# Patient Record
Sex: FEMALE | Race: BLACK OR AFRICAN AMERICAN | NOT HISPANIC OR LATINO
[De-identification: names, ages, dates, MRNs, and addresses within clinical notes are randomized per-mention and may not be internally consistent; named-entity substitution may affect disease eponyms.]

---

## 2017-03-20 ENCOUNTER — CHART COPY (OUTPATIENT)
Age: 37
End: 2017-03-20

## 2017-03-24 ENCOUNTER — APPOINTMENT (OUTPATIENT)
Dept: ORTHOPEDIC SURGERY | Facility: CLINIC | Age: 37
End: 2017-03-24

## 2017-03-24 VITALS
BODY MASS INDEX: 24.45 KG/M2 | HEART RATE: 75 BPM | HEIGHT: 64.5 IN | SYSTOLIC BLOOD PRESSURE: 113 MMHG | DIASTOLIC BLOOD PRESSURE: 83 MMHG | WEIGHT: 145 LBS

## 2017-04-05 ENCOUNTER — APPOINTMENT (OUTPATIENT)
Dept: ORTHOPEDIC SURGERY | Facility: CLINIC | Age: 37
End: 2017-04-05

## 2017-04-05 VITALS
BODY MASS INDEX: 24.62 KG/M2 | HEART RATE: 76 BPM | WEIGHT: 146 LBS | DIASTOLIC BLOOD PRESSURE: 68 MMHG | SYSTOLIC BLOOD PRESSURE: 107 MMHG | HEIGHT: 64.5 IN

## 2017-04-05 DIAGNOSIS — M75.80 OTHER SHOULDER LESIONS, UNSPECIFIED SHOULDER: ICD-10-CM

## 2017-04-07 ENCOUNTER — APPOINTMENT (OUTPATIENT)
Dept: ORTHOPEDIC SURGERY | Facility: CLINIC | Age: 37
End: 2017-04-07

## 2017-04-07 VITALS
DIASTOLIC BLOOD PRESSURE: 73 MMHG | BODY MASS INDEX: 24.62 KG/M2 | HEIGHT: 64.5 IN | WEIGHT: 146 LBS | SYSTOLIC BLOOD PRESSURE: 108 MMHG | HEART RATE: 76 BPM

## 2017-05-10 ENCOUNTER — EMERGENCY (EMERGENCY)
Facility: HOSPITAL | Age: 37
LOS: 1 days | Discharge: HOME | End: 2017-05-10
Attending: EMERGENCY MEDICINE | Admitting: EMERGENCY MEDICINE
Payer: COMMERCIAL

## 2017-05-10 ENCOUNTER — APPOINTMENT (OUTPATIENT)
Dept: ORTHOPEDIC SURGERY | Facility: CLINIC | Age: 37
End: 2017-05-10

## 2017-05-10 VITALS — HEART RATE: 99 BPM | DIASTOLIC BLOOD PRESSURE: 94 MMHG | SYSTOLIC BLOOD PRESSURE: 140 MMHG | HEIGHT: 64.5 IN

## 2017-05-10 VITALS
RESPIRATION RATE: 18 BRPM | SYSTOLIC BLOOD PRESSURE: 120 MMHG | OXYGEN SATURATION: 98 % | DIASTOLIC BLOOD PRESSURE: 87 MMHG | TEMPERATURE: 99 F

## 2017-05-10 DIAGNOSIS — Z98.89 OTHER SPECIFIED POSTPROCEDURAL STATES: Chronic | ICD-10-CM

## 2017-05-10 DIAGNOSIS — M54.5 LOW BACK PAIN: ICD-10-CM

## 2017-05-10 LAB
ALBUMIN SERPL ELPH-MCNC: 4.1 G/DL — SIGNIFICANT CHANGE UP (ref 3.3–5)
ALP SERPL-CCNC: 34 U/L — LOW (ref 40–120)
ALT FLD-CCNC: 19 U/L RC — SIGNIFICANT CHANGE UP (ref 10–45)
ANION GAP SERPL CALC-SCNC: 13 MMOL/L — SIGNIFICANT CHANGE UP (ref 5–17)
ANION GAP SERPL CALC-SCNC: 15 MMOL/L — SIGNIFICANT CHANGE UP (ref 5–17)
AST SERPL-CCNC: 35 U/L — SIGNIFICANT CHANGE UP (ref 10–40)
BASOPHILS # BLD AUTO: 0.1 K/UL — SIGNIFICANT CHANGE UP (ref 0–0.2)
BASOPHILS NFR BLD AUTO: 1.9 % — SIGNIFICANT CHANGE UP (ref 0–2)
BILIRUB SERPL-MCNC: 0.5 MG/DL — SIGNIFICANT CHANGE UP (ref 0.2–1.2)
BUN SERPL-MCNC: 16 MG/DL — SIGNIFICANT CHANGE UP (ref 7–23)
BUN SERPL-MCNC: 19 MG/DL — SIGNIFICANT CHANGE UP (ref 7–23)
CALCIUM SERPL-MCNC: 9 MG/DL — SIGNIFICANT CHANGE UP (ref 8.4–10.5)
CALCIUM SERPL-MCNC: 9.2 MG/DL — SIGNIFICANT CHANGE UP (ref 8.4–10.5)
CHLORIDE SERPL-SCNC: 104 MMOL/L — SIGNIFICANT CHANGE UP (ref 96–108)
CHLORIDE SERPL-SCNC: 105 MMOL/L — SIGNIFICANT CHANGE UP (ref 96–108)
CO2 SERPL-SCNC: 18 MMOL/L — LOW (ref 22–31)
CO2 SERPL-SCNC: 19 MMOL/L — LOW (ref 22–31)
CREAT SERPL-MCNC: 1.17 MG/DL — SIGNIFICANT CHANGE UP (ref 0.5–1.3)
CREAT SERPL-MCNC: 1.28 MG/DL — SIGNIFICANT CHANGE UP (ref 0.5–1.3)
EOSINOPHIL # BLD AUTO: 0.3 K/UL — SIGNIFICANT CHANGE UP (ref 0–0.5)
EOSINOPHIL NFR BLD AUTO: 4.5 % — SIGNIFICANT CHANGE UP (ref 0–6)
GLUCOSE SERPL-MCNC: 76 MG/DL — SIGNIFICANT CHANGE UP (ref 70–99)
GLUCOSE SERPL-MCNC: 90 MG/DL — SIGNIFICANT CHANGE UP (ref 70–99)
HCT VFR BLD CALC: 45.3 % — HIGH (ref 34.5–45)
HGB BLD-MCNC: 15.1 G/DL — SIGNIFICANT CHANGE UP (ref 11.5–15.5)
LYMPHOCYTES # BLD AUTO: 2.9 K/UL — SIGNIFICANT CHANGE UP (ref 1–3.3)
LYMPHOCYTES # BLD AUTO: 47.6 % — HIGH (ref 13–44)
MCHC RBC-ENTMCNC: 30.3 PG — SIGNIFICANT CHANGE UP (ref 27–34)
MCHC RBC-ENTMCNC: 33.4 GM/DL — SIGNIFICANT CHANGE UP (ref 32–36)
MCV RBC AUTO: 90.6 FL — SIGNIFICANT CHANGE UP (ref 80–100)
MONOCYTES # BLD AUTO: 0.5 K/UL — SIGNIFICANT CHANGE UP (ref 0–0.9)
MONOCYTES NFR BLD AUTO: 7.7 % — SIGNIFICANT CHANGE UP (ref 2–14)
NEUTROPHILS # BLD AUTO: 2.4 K/UL — SIGNIFICANT CHANGE UP (ref 1.8–7.4)
NEUTROPHILS NFR BLD AUTO: 38.4 % — LOW (ref 43–77)
PLATELET # BLD AUTO: 247 K/UL — SIGNIFICANT CHANGE UP (ref 150–400)
POTASSIUM SERPL-MCNC: 4.5 MMOL/L — SIGNIFICANT CHANGE UP (ref 3.5–5.3)
POTASSIUM SERPL-MCNC: 6.2 MMOL/L — CRITICAL HIGH (ref 3.5–5.3)
POTASSIUM SERPL-SCNC: 4.5 MMOL/L — SIGNIFICANT CHANGE UP (ref 3.5–5.3)
POTASSIUM SERPL-SCNC: 6.2 MMOL/L — CRITICAL HIGH (ref 3.5–5.3)
PROT SERPL-MCNC: 7.7 G/DL — SIGNIFICANT CHANGE UP (ref 6–8.3)
RBC # BLD: 5 M/UL — SIGNIFICANT CHANGE UP (ref 3.8–5.2)
RBC # FLD: 11.7 % — SIGNIFICANT CHANGE UP (ref 10.3–14.5)
SODIUM SERPL-SCNC: 137 MMOL/L — SIGNIFICANT CHANGE UP (ref 135–145)
SODIUM SERPL-SCNC: 137 MMOL/L — SIGNIFICANT CHANGE UP (ref 135–145)
WBC # BLD: 6.1 K/UL — SIGNIFICANT CHANGE UP (ref 3.8–10.5)
WBC # FLD AUTO: 6.1 K/UL — SIGNIFICANT CHANGE UP (ref 3.8–10.5)

## 2017-05-10 PROCEDURE — 99220: CPT

## 2017-05-10 RX ORDER — ACETAMINOPHEN 500 MG
1000 TABLET ORAL ONCE
Qty: 0 | Refills: 0 | Status: COMPLETED | OUTPATIENT
Start: 2017-05-10 | End: 2017-05-10

## 2017-05-10 RX ORDER — KETOROLAC TROMETHAMINE 30 MG/ML
30 SYRINGE (ML) INJECTION EVERY 6 HOURS
Qty: 0 | Refills: 0 | Status: DISCONTINUED | OUTPATIENT
Start: 2017-05-10 | End: 2017-05-11

## 2017-05-10 RX ORDER — SODIUM CHLORIDE 9 MG/ML
1000 INJECTION INTRAMUSCULAR; INTRAVENOUS; SUBCUTANEOUS ONCE
Qty: 0 | Refills: 0 | Status: COMPLETED | OUTPATIENT
Start: 2017-05-10 | End: 2017-05-10

## 2017-05-10 RX ORDER — DOCUSATE SODIUM 100 MG
100 CAPSULE ORAL ONCE
Qty: 0 | Refills: 0 | Status: COMPLETED | OUTPATIENT
Start: 2017-05-10 | End: 2017-05-10

## 2017-05-10 RX ORDER — OXYCODONE HYDROCHLORIDE 5 MG/1
5 TABLET ORAL EVERY 4 HOURS
Qty: 0 | Refills: 0 | Status: DISCONTINUED | OUTPATIENT
Start: 2017-05-10 | End: 2017-05-10

## 2017-05-10 RX ORDER — MORPHINE SULFATE 50 MG/1
4 CAPSULE, EXTENDED RELEASE ORAL ONCE
Qty: 0 | Refills: 0 | Status: DISCONTINUED | OUTPATIENT
Start: 2017-05-10 | End: 2017-05-10

## 2017-05-10 RX ORDER — ONDANSETRON 8 MG/1
4 TABLET, FILM COATED ORAL EVERY 6 HOURS
Qty: 0 | Refills: 0 | Status: COMPLETED | OUTPATIENT
Start: 2017-05-10 | End: 2017-05-10

## 2017-05-10 RX ORDER — OXYCODONE HYDROCHLORIDE 5 MG/1
5 TABLET ORAL ONCE
Qty: 0 | Refills: 0 | Status: DISCONTINUED | OUTPATIENT
Start: 2017-05-10 | End: 2017-05-10

## 2017-05-10 RX ORDER — DIAZEPAM 5 MG
5 TABLET ORAL EVERY 6 HOURS
Qty: 0 | Refills: 0 | Status: DISCONTINUED | OUTPATIENT
Start: 2017-05-10 | End: 2017-05-11

## 2017-05-10 RX ADMIN — OXYCODONE HYDROCHLORIDE 5 MILLIGRAM(S): 5 TABLET ORAL at 13:41

## 2017-05-10 RX ADMIN — SODIUM CHLORIDE 1000 MILLILITER(S): 9 INJECTION INTRAMUSCULAR; INTRAVENOUS; SUBCUTANEOUS at 20:25

## 2017-05-10 RX ADMIN — Medication 400 MILLIGRAM(S): at 17:15

## 2017-05-10 RX ADMIN — Medication 30 MILLIGRAM(S): at 21:28

## 2017-05-10 RX ADMIN — Medication 125 MILLIGRAM(S): at 17:14

## 2017-05-10 RX ADMIN — OXYCODONE HYDROCHLORIDE 5 MILLIGRAM(S): 5 TABLET ORAL at 11:45

## 2017-05-10 RX ADMIN — Medication 30 MILLIGRAM(S): at 22:39

## 2017-05-10 RX ADMIN — Medication 100 MILLIGRAM(S): at 11:45

## 2017-05-10 RX ADMIN — OXYCODONE HYDROCHLORIDE 5 MILLIGRAM(S): 5 TABLET ORAL at 23:28

## 2017-05-10 RX ADMIN — Medication 5 MILLIGRAM(S): at 22:39

## 2017-05-10 RX ADMIN — ONDANSETRON 4 MILLIGRAM(S): 8 TABLET, FILM COATED ORAL at 20:24

## 2017-05-10 NOTE — ED CDU PROVIDER NOTE - PHYSICAL EXAMINATION
MSK: No midline TTP.  right paraspinal       Attending note. Patient is alert and severe pain. Patient has tenderness to the lumbar spine with right para lumbar greater than the left. No tenderness in the sciatic notch on the right. Thoracic spine is nontender. There is no saddle anesthesia. DTRs are +2/4 equal and symmetrical. Sensation is equal with paresthesia bilateral lower extremities with right greater than left. Pain is exacerbated by movement.

## 2017-05-10 NOTE — ED PROVIDER NOTE - PHYSICAL EXAMINATION
MSK: No midline TTP.  right paraspinal MSK: No midline TTP.  right paraspinal       Attending note. Patient is alert and severe pain. Patient has tenderness to the lumbar spine with right para lumbar greater than the left. No tenderness in the sciatic notch on the right. Thoracic spine is nontender. There is no saddle anesthesia. DTRs are +2/4 equal and symmetrical. Sensation is equal with paresthesia bilateral lower extremities with right greater than left. Pain is exacerbated by movement.

## 2017-05-10 NOTE — ED CDU PROVIDER NOTE - ATTENDING CONTRIBUTION TO CARE
I performed a history and physical exam of the patient and discussed their management with the resident. I reviewed the resident's note and agree with the documented findings and plan of care.  Attn- pt with hx of lumbar disc herniations and worsening pain with radiation to both legs and paresthesia to both lower extremities R >L   MRI and pain control.  Poss spine if MRI or response to pain warrants.

## 2017-05-10 NOTE — ED PROVIDER NOTE - FAMILY HISTORY
<<-----Click on this checkbox to enter Family History Family history of heart disease, (mother)     Sibling  Still living? Unknown  Family history of asthma, Age at diagnosis: Age Unknown

## 2017-05-10 NOTE — ED ADULT NURSE REASSESSMENT NOTE - NS ED NURSE REASSESS COMMENT FT1
Amb to BR
amparo lunch
pt reports no relief of back spasm pain with 7/10 pain at this time. Medicated with oxy 5, pt left for MRI, VSS.
Received pt from [facility] cdu , received pt alert and responsive, oriented x4, denies any respiratory distress, SOB, or difficulty breathing. Pt transferred to CDU for observation for lower back pain. Pt pending MRI at this time. Pt reports nausea, zofran 4mg given. IV in place, patent and free of signs of infiltration,  pt denies chest pain or palpitations, V/S stable, pt afebrile. Pt educated on unit and unit rules, instructed patient to notify RN of any needed assistance, Pt verbalizes understanding, Call bell placed within reach. Safety maintained. Will continue to monitor.

## 2017-05-10 NOTE — ED PROVIDER NOTE - MEDICAL DECISION MAKING DETAILS
Attending note-acute on chronic lower back pain with radiculopathy. Oxycodone and Valium. Patient received Toradol 30 mg at approximately 11 AM this morning by her spine surgeon. Reassess

## 2017-05-10 NOTE — ED PROVIDER NOTE - OBJECTIVE STATEMENT
38 yo female with PMHx of L5-S1 herniation s/p laminectomy and discectomy in 2013 p.w low back pain.  The patient reports that since having surgery in 2013 she has had intermittent episodes of low back pain managed with nsaids and flexeril at home.  For the past 2-3 weeks she endorses worsening low back pain, worse with movement, radiating down both thighs with tingling going down to her feet. Atraumatic. No relief with nsaids and flexeril.  SHe went to her Spine surgeon, Dr. Cruz Bowles, today who gave her toradol and sent her to [facility] for pain management. Patient denies fevers/chills, Hx of malignancy, fecal/urinary incontinence.  Patient ambulatory with some discomfort. 36 yo female with PMHx of L5-S1 herniation s/p laminectomy and discectomy in 2013 p.w low back pain.  The patient reports that since having surgery in 2013 she has had intermittent episodes of low back pain managed with nsaids and flexeril at home.  For the past 2-3 weeks she endorses worsening low back pain, worse with movement, radiating down both thighs with tingling going down to her feet. Atraumatic. No relief with nsaids and flexeril.  SHe went to her Spine surgeon, Dr. Cruz Bowles, today who gave her toradol and sent her to [facility] for pain management. Patient denies fevers/chills, Hx of malignancy, fecal/urinary incontinence.  Patient ambulatory with some discomfort.      Attending note. Patient was seen in the fast track room #2. Agree with the above. Patient complaining of severe low back pain with radiation to both legs with paresthesias bilaterally, right greater than left. Patient denies any acute injury, fevers, bowel or bladder or saddle anesthesia. Patient was seen by her spine surgeon today and referred to the emergency department for pain control.

## 2017-05-10 NOTE — ED CDU PROVIDER NOTE - PROGRESS NOTE DETAILS
Attn- pt with hx of lumbar disc herniations and worsening pain with radiation to both legs and paresthesia to both lower extremities R >L   MRI and pain control.  Poss spine if MRI or response to pain warrants. CDU NOTE FRANC Alfredo: pt resting, pain controlled at this time. +mild lightheadedness and N associates with medications. paresthesias to b/l LEs. no bowel/urine incontinence.  NAD VSS. CDU NOTE FRANC Alfredo: pt in MRI. medicated for back pain prior to going for test. CDU NOTE PA Juni: pt asleep. NAD VSS. CDU NOTE FRANC Alfredo: pt resting comfortably, feels well without complaint. pain mild. no new neurologic symptoms. NAD VSS. Patient resting comfortably in bed, NAD, VSS. Mild back pain, no neuro deficits. Derek Mon PA-C. I , Dr Kemar Hawk has seen and evaluated the patient.  The pateient reports improvement in symptoms.  The patient is stable for discharge home and will follow up with their spine physiacian. Pt given decadron for pain and dc'd on steroid taper

## 2017-05-10 NOTE — ED PROVIDER NOTE - PMH
Carpal Tunnel Syndrome  B/L  Chronic back pain    Herniated Cervical Disc    MVP (Mitral Valve Prolapse)    Umbilical Hernia

## 2017-05-10 NOTE — ED CDU PROVIDER NOTE - DETAILS
Attn- pt with hx of lumbar disc herniations and worsening pain with radiation to both legs and paresthesia to both lower extremities R >L   MRI and pain control.  Poss spine if MRI or response to pain warrants.

## 2017-05-10 NOTE — ED CDU PROVIDER NOTE - FAMILY HISTORY
Family history of heart disease, (mother)     Sibling  Still living? Unknown  Family history of asthma, Age at diagnosis: Age Unknown <<-----Click on this checkbox to enter Family History

## 2017-05-10 NOTE — ED CDU PROVIDER NOTE - OBJECTIVE STATEMENT
36 yo female with PMHx of L5-S1 herniation s/p laminectomy and discectomy in 2013 p.w low back pain.  The patient reports that since having surgery in 2013 she has had intermittent episodes of low back pain managed with nsaids and flexeril at home.  For the past 2-3 weeks she endorses worsening low back pain, worse with movement, radiating down both thighs with tingling going down to her feet. Atraumatic. No relief with nsaids and flexeril.  SHe went to her Spine surgeon, Dr. Cruz Bowles, today who gave her toradol and sent her to [facility] for pain management. Patient denies fevers/chills, Hx of malignancy, fecal/urinary incontinence.  Patient ambulatory with some discomfort.      Attending note. Patient was seen in the fast track room #2. Agree with the above. Patient complaining of severe low back pain with radiation to both legs with paresthesias bilaterally, right greater than left. Patient denies any acute injury, fevers, bowel or bladder or saddle anesthesia. Patient was seen by her spine surgeon today and referred to the emergency department for pain control.

## 2017-05-10 NOTE — ED ADULT NURSE NOTE - OBJECTIVE STATEMENT
37 yr old female to ed refer from spinal surgeon. Pt c/o increased back pain. Has h/o laminectomy 2013. C/o parasethesia ,l worse than rt lower extremities. Denies fecal or urinary incontinence 37 yr old female to ed refer from spinal surgeon. Pt c/o increased back pain. Has h/o laminectomy 2013. C/o pares ,l worse than rt lower extremities. Denies fecal or urinary incontinence 37 yr old female to ed refer from spinal surgeon. Pt c/o increased back pain x2-3 weeks.Has h/o laminectomy 2013. C/o paresthesias bilat lower extremities ,l worse than rt lower extremities. Denies fecal or urinary incontinence. No relief after toradol from MD.  No relief with NSAIDS and flexeril.

## 2017-05-11 VITALS
TEMPERATURE: 99 F | DIASTOLIC BLOOD PRESSURE: 74 MMHG | HEART RATE: 84 BPM | RESPIRATION RATE: 16 BRPM | OXYGEN SATURATION: 100 % | SYSTOLIC BLOOD PRESSURE: 117 MMHG

## 2017-05-11 PROCEDURE — 96374 THER/PROPH/DIAG INJ IV PUSH: CPT | Mod: XU

## 2017-05-11 PROCEDURE — 99284 EMERGENCY DEPT VISIT MOD MDM: CPT | Mod: 25

## 2017-05-11 PROCEDURE — 99217: CPT

## 2017-05-11 PROCEDURE — 72158 MRI LUMBAR SPINE W/O & W/DYE: CPT

## 2017-05-11 PROCEDURE — A9585: CPT

## 2017-05-11 PROCEDURE — 80048 BASIC METABOLIC PNL TOTAL CA: CPT

## 2017-05-11 PROCEDURE — 96375 TX/PRO/DX INJ NEW DRUG ADDON: CPT | Mod: XU

## 2017-05-11 PROCEDURE — G0378: CPT

## 2017-05-11 PROCEDURE — 72158 MRI LUMBAR SPINE W/O & W/DYE: CPT | Mod: 26

## 2017-05-11 PROCEDURE — 85027 COMPLETE CBC AUTOMATED: CPT

## 2017-05-11 PROCEDURE — 80053 COMPREHEN METABOLIC PANEL: CPT

## 2017-05-11 PROCEDURE — 96376 TX/PRO/DX INJ SAME DRUG ADON: CPT

## 2017-05-11 RX ORDER — DOCUSATE SODIUM 100 MG
100 CAPSULE ORAL ONCE
Qty: 0 | Refills: 0 | Status: COMPLETED | OUTPATIENT
Start: 2017-05-11 | End: 2017-05-11

## 2017-05-11 RX ORDER — OXYCODONE HYDROCHLORIDE 5 MG/1
1 TABLET ORAL
Qty: 16 | Refills: 0 | OUTPATIENT
Start: 2017-05-11 | End: 2017-05-15

## 2017-05-11 RX ORDER — DEXAMETHASONE 0.5 MG/5ML
10 ELIXIR ORAL ONCE
Qty: 0 | Refills: 0 | Status: COMPLETED | OUTPATIENT
Start: 2017-05-11 | End: 2017-05-11

## 2017-05-11 RX ORDER — DIAZEPAM 5 MG
1 TABLET ORAL
Qty: 16 | Refills: 0 | OUTPATIENT
Start: 2017-05-11 | End: 2017-05-15

## 2017-05-11 RX ADMIN — Medication 5 MILLIGRAM(S): at 08:24

## 2017-05-11 RX ADMIN — Medication 30 MILLIGRAM(S): at 08:24

## 2017-05-11 RX ADMIN — OXYCODONE HYDROCHLORIDE 5 MILLIGRAM(S): 5 TABLET ORAL at 00:49

## 2017-05-11 RX ADMIN — Medication 100 MILLIGRAM(S): at 09:28

## 2017-05-11 RX ADMIN — Medication 10 MILLIGRAM(S): at 09:27

## 2017-05-12 ENCOUNTER — APPOINTMENT (OUTPATIENT)
Dept: ORTHOPEDIC SURGERY | Facility: CLINIC | Age: 37
End: 2017-05-12

## 2017-05-12 VITALS
DIASTOLIC BLOOD PRESSURE: 75 MMHG | BODY MASS INDEX: 24.62 KG/M2 | WEIGHT: 146 LBS | HEART RATE: 82 BPM | HEIGHT: 64.5 IN | SYSTOLIC BLOOD PRESSURE: 109 MMHG

## 2017-06-14 ENCOUNTER — APPOINTMENT (OUTPATIENT)
Dept: ORTHOPEDIC SURGERY | Facility: CLINIC | Age: 37
End: 2017-06-14

## 2017-06-14 VITALS
WEIGHT: 145 LBS | DIASTOLIC BLOOD PRESSURE: 86 MMHG | HEIGHT: 64.5 IN | HEART RATE: 81 BPM | BODY MASS INDEX: 24.45 KG/M2 | SYSTOLIC BLOOD PRESSURE: 121 MMHG

## 2017-06-14 DIAGNOSIS — R20.2 PARESTHESIA OF SKIN: ICD-10-CM

## 2017-06-16 ENCOUNTER — APPOINTMENT (OUTPATIENT)
Dept: ORTHOPEDIC SURGERY | Facility: CLINIC | Age: 37
End: 2017-06-16

## 2017-06-16 VITALS
DIASTOLIC BLOOD PRESSURE: 80 MMHG | SYSTOLIC BLOOD PRESSURE: 126 MMHG | BODY MASS INDEX: 29.06 KG/M2 | HEART RATE: 71 BPM | HEIGHT: 60 IN | WEIGHT: 148 LBS

## 2017-06-16 VITALS
SYSTOLIC BLOOD PRESSURE: 115 MMHG | HEART RATE: 72 BPM | BODY MASS INDEX: 24.45 KG/M2 | HEIGHT: 64.5 IN | WEIGHT: 145 LBS | DIASTOLIC BLOOD PRESSURE: 81 MMHG

## 2017-06-19 ENCOUNTER — APPOINTMENT (OUTPATIENT)
Dept: ORTHOPEDIC SURGERY | Facility: CLINIC | Age: 37
End: 2017-06-19

## 2017-06-19 VITALS
HEART RATE: 80 BPM | BODY MASS INDEX: 29.45 KG/M2 | DIASTOLIC BLOOD PRESSURE: 80 MMHG | HEIGHT: 60 IN | SYSTOLIC BLOOD PRESSURE: 116 MMHG | WEIGHT: 150 LBS

## 2017-07-05 ENCOUNTER — APPOINTMENT (OUTPATIENT)
Dept: ORTHOPEDIC SURGERY | Facility: CLINIC | Age: 37
End: 2017-07-05
Payer: OTHER MISCELLANEOUS

## 2017-07-05 VITALS
HEART RATE: 82 BPM | HEIGHT: 60 IN | BODY MASS INDEX: 29.45 KG/M2 | SYSTOLIC BLOOD PRESSURE: 116 MMHG | WEIGHT: 150 LBS | DIASTOLIC BLOOD PRESSURE: 83 MMHG

## 2017-07-05 PROCEDURE — 96372 THER/PROPH/DIAG INJ SC/IM: CPT

## 2017-07-05 PROCEDURE — 99214 OFFICE O/P EST MOD 30 MIN: CPT | Mod: 25

## 2017-07-05 RX ADMIN — KETOROLAC TROMETHAMINE 1 MG/ML: 30 INJECTION, SOLUTION INTRAMUSCULAR; INTRAVENOUS at 00:00

## 2017-07-12 ENCOUNTER — APPOINTMENT (OUTPATIENT)
Dept: ORTHOPEDIC SURGERY | Facility: CLINIC | Age: 37
End: 2017-07-12

## 2017-07-12 VITALS
HEIGHT: 60 IN | WEIGHT: 146 LBS | DIASTOLIC BLOOD PRESSURE: 74 MMHG | SYSTOLIC BLOOD PRESSURE: 106 MMHG | BODY MASS INDEX: 28.66 KG/M2 | HEART RATE: 73 BPM

## 2017-07-12 RX ORDER — PREDNISONE 10 MG/1
10 TABLET ORAL
Qty: 42 | Refills: 0 | Status: DISCONTINUED | COMMUNITY
Start: 2017-05-11 | End: 2017-07-12

## 2017-07-24 ENCOUNTER — CHART COPY (OUTPATIENT)
Age: 37
End: 2017-07-24

## 2017-08-18 ENCOUNTER — APPOINTMENT (OUTPATIENT)
Dept: HEART AND VASCULAR | Facility: CLINIC | Age: 37
End: 2017-08-18

## 2017-08-18 ENCOUNTER — APPOINTMENT (OUTPATIENT)
Dept: ORTHOPEDIC SURGERY | Facility: CLINIC | Age: 37
End: 2017-08-18
Payer: OTHER MISCELLANEOUS

## 2017-08-18 VITALS
HEART RATE: 72 BPM | HEIGHT: 60 IN | DIASTOLIC BLOOD PRESSURE: 76 MMHG | SYSTOLIC BLOOD PRESSURE: 112 MMHG | WEIGHT: 148 LBS | BODY MASS INDEX: 29.06 KG/M2

## 2017-08-18 PROCEDURE — 99214 OFFICE O/P EST MOD 30 MIN: CPT

## 2017-08-18 RX ORDER — CELECOXIB 100 MG/1
100 CAPSULE ORAL
Qty: 60 | Refills: 2 | Status: ACTIVE | COMMUNITY
Start: 2017-08-18 | End: 1900-01-01

## 2017-08-21 ENCOUNTER — APPOINTMENT (OUTPATIENT)
Dept: ORTHOPEDIC SURGERY | Facility: CLINIC | Age: 37
End: 2017-08-21
Payer: OTHER MISCELLANEOUS

## 2017-08-21 VITALS
SYSTOLIC BLOOD PRESSURE: 107 MMHG | WEIGHT: 150 LBS | HEART RATE: 76 BPM | DIASTOLIC BLOOD PRESSURE: 73 MMHG | BODY MASS INDEX: 29.45 KG/M2 | HEIGHT: 60 IN

## 2017-08-21 PROCEDURE — 99214 OFFICE O/P EST MOD 30 MIN: CPT

## 2017-08-22 ENCOUNTER — APPOINTMENT (OUTPATIENT)
Dept: ORTHOPEDIC SURGERY | Facility: CLINIC | Age: 37
End: 2017-08-22
Payer: OTHER MISCELLANEOUS

## 2017-08-22 VITALS
BODY MASS INDEX: 29.45 KG/M2 | SYSTOLIC BLOOD PRESSURE: 109 MMHG | HEART RATE: 73 BPM | HEIGHT: 60 IN | DIASTOLIC BLOOD PRESSURE: 77 MMHG | WEIGHT: 150 LBS

## 2017-08-22 PROCEDURE — 99213 OFFICE O/P EST LOW 20 MIN: CPT

## 2017-09-12 ENCOUNTER — APPOINTMENT (OUTPATIENT)
Dept: ORTHOPEDIC SURGERY | Facility: HOSPITAL | Age: 37
End: 2017-09-12

## 2017-09-12 ENCOUNTER — OUTPATIENT (OUTPATIENT)
Dept: OUTPATIENT SERVICES | Facility: HOSPITAL | Age: 37
LOS: 1 days | End: 2017-09-12
Payer: COMMERCIAL

## 2017-09-12 DIAGNOSIS — Z98.89 OTHER SPECIFIED POSTPROCEDURAL STATES: Chronic | ICD-10-CM

## 2017-09-12 DIAGNOSIS — M51.26 OTHER INTERVERTEBRAL DISC DISPLACEMENT, LUMBAR REGION: ICD-10-CM

## 2017-09-12 DIAGNOSIS — M51.36 OTHER INTERVERTEBRAL DISC DEGENERATION, LUMBAR REGION: ICD-10-CM

## 2017-09-12 DIAGNOSIS — M54.16 RADICULOPATHY, LUMBAR REGION: ICD-10-CM

## 2017-09-12 PROCEDURE — 77003 FLUOROGUIDE FOR SPINE INJECT: CPT

## 2017-09-12 PROCEDURE — 64483 NJX AA&/STRD TFRM EPI L/S 1: CPT | Mod: 50

## 2017-09-22 ENCOUNTER — APPOINTMENT (OUTPATIENT)
Dept: ORTHOPEDIC SURGERY | Facility: CLINIC | Age: 37
End: 2017-09-22
Payer: OTHER MISCELLANEOUS

## 2017-09-22 VITALS
DIASTOLIC BLOOD PRESSURE: 83 MMHG | HEIGHT: 60 IN | HEART RATE: 67 BPM | SYSTOLIC BLOOD PRESSURE: 118 MMHG | WEIGHT: 150 LBS | BODY MASS INDEX: 29.45 KG/M2

## 2017-09-22 PROCEDURE — 99214 OFFICE O/P EST MOD 30 MIN: CPT

## 2017-09-25 ENCOUNTER — APPOINTMENT (OUTPATIENT)
Dept: ORTHOPEDIC SURGERY | Facility: CLINIC | Age: 37
End: 2017-09-25
Payer: OTHER MISCELLANEOUS

## 2017-09-25 VITALS
WEIGHT: 145 LBS | BODY MASS INDEX: 28.47 KG/M2 | SYSTOLIC BLOOD PRESSURE: 127 MMHG | HEART RATE: 66 BPM | HEIGHT: 60 IN | DIASTOLIC BLOOD PRESSURE: 83 MMHG

## 2017-09-25 PROCEDURE — 99214 OFFICE O/P EST MOD 30 MIN: CPT

## 2017-09-26 ENCOUNTER — APPOINTMENT (OUTPATIENT)
Dept: ORTHOPEDIC SURGERY | Facility: CLINIC | Age: 37
End: 2017-09-26
Payer: OTHER MISCELLANEOUS

## 2017-09-26 VITALS — BODY MASS INDEX: 28.47 KG/M2 | WEIGHT: 145 LBS | HEIGHT: 60 IN

## 2017-09-26 PROCEDURE — 99214 OFFICE O/P EST MOD 30 MIN: CPT

## 2017-10-16 ENCOUNTER — APPOINTMENT (OUTPATIENT)
Dept: ORTHOPEDIC SURGERY | Facility: CLINIC | Age: 37
End: 2017-10-16
Payer: OTHER MISCELLANEOUS

## 2017-10-16 VITALS
HEART RATE: 60 BPM | SYSTOLIC BLOOD PRESSURE: 120 MMHG | HEIGHT: 64 IN | BODY MASS INDEX: 24.41 KG/M2 | DIASTOLIC BLOOD PRESSURE: 85 MMHG | WEIGHT: 143 LBS

## 2017-10-16 DIAGNOSIS — M75.121 COMPLETE ROTATOR CUFF TEAR OR RUPTURE OF RIGHT SHOULDER, NOT SPECIFIED AS TRAUMATIC: ICD-10-CM

## 2017-10-16 DIAGNOSIS — S43.401A UNSPECIFIED SPRAIN OF RIGHT SHOULDER JOINT, INITIAL ENCOUNTER: ICD-10-CM

## 2017-10-16 PROCEDURE — 99214 OFFICE O/P EST MOD 30 MIN: CPT

## 2017-10-16 RX ORDER — ONDANSETRON 4 MG/1
4 TABLET ORAL 3 TIMES DAILY
Qty: 7 | Refills: 0 | Status: DISCONTINUED | COMMUNITY
Start: 2017-05-12 | End: 2017-10-16

## 2017-10-16 RX ORDER — DOCUSATE SODIUM 100 MG/1
100 CAPSULE ORAL TWICE DAILY
Qty: 60 | Refills: 0 | Status: DISCONTINUED | COMMUNITY
Start: 2017-05-12 | End: 2017-10-16

## 2017-10-16 RX ORDER — TIZANIDINE 4 MG/1
4 TABLET ORAL
Qty: 50 | Refills: 0 | Status: DISCONTINUED | COMMUNITY
Start: 2017-07-05 | End: 2017-10-16

## 2017-10-16 RX ORDER — METHYLPREDNISOLONE 4 MG/1
4 TABLET ORAL
Qty: 21 | Refills: 0 | Status: DISCONTINUED | COMMUNITY
Start: 2017-03-21 | End: 2017-10-16

## 2017-10-16 RX ORDER — TIZANIDINE 4 MG/1
4 TABLET ORAL
Qty: 50 | Refills: 0 | Status: DISCONTINUED | COMMUNITY
Start: 2017-07-24 | End: 2017-10-16

## 2017-10-18 ENCOUNTER — APPOINTMENT (OUTPATIENT)
Dept: ORTHOPEDIC SURGERY | Facility: CLINIC | Age: 37
End: 2017-10-18
Payer: OTHER MISCELLANEOUS

## 2017-10-18 VITALS
WEIGHT: 146 LBS | BODY MASS INDEX: 25.87 KG/M2 | DIASTOLIC BLOOD PRESSURE: 86 MMHG | SYSTOLIC BLOOD PRESSURE: 118 MMHG | HEART RATE: 74 BPM | HEIGHT: 63 IN

## 2017-10-18 PROCEDURE — 99214 OFFICE O/P EST MOD 30 MIN: CPT

## 2017-10-23 ENCOUNTER — APPOINTMENT (OUTPATIENT)
Dept: ORTHOPEDIC SURGERY | Facility: CLINIC | Age: 37
End: 2017-10-23

## 2017-10-27 ENCOUNTER — APPOINTMENT (OUTPATIENT)
Dept: ORTHOPEDIC SURGERY | Facility: HOSPITAL | Age: 37
End: 2017-10-27

## 2017-11-08 ENCOUNTER — APPOINTMENT (OUTPATIENT)
Dept: ORTHOPEDIC SURGERY | Facility: CLINIC | Age: 37
End: 2017-11-08

## 2017-11-13 ENCOUNTER — APPOINTMENT (OUTPATIENT)
Dept: ORTHOPEDIC SURGERY | Facility: CLINIC | Age: 37
End: 2017-11-13
Payer: OTHER MISCELLANEOUS

## 2017-11-13 VITALS — HEART RATE: 76 BPM | DIASTOLIC BLOOD PRESSURE: 85 MMHG | SYSTOLIC BLOOD PRESSURE: 125 MMHG | HEIGHT: 63 IN

## 2017-11-13 DIAGNOSIS — M54.12 RADICULOPATHY, CERVICAL REGION: ICD-10-CM

## 2017-11-13 PROCEDURE — 99214 OFFICE O/P EST MOD 30 MIN: CPT

## 2017-11-16 ENCOUNTER — INPATIENT (INPATIENT)
Facility: HOSPITAL | Age: 37
LOS: 6 days | Discharge: HOME HEALTH | DRG: 552 | End: 2017-11-23
Attending: INTERNAL MEDICINE | Admitting: INTERNAL MEDICINE
Payer: COMMERCIAL

## 2017-11-16 VITALS
DIASTOLIC BLOOD PRESSURE: 86 MMHG | SYSTOLIC BLOOD PRESSURE: 127 MMHG | TEMPERATURE: 98 F | HEART RATE: 83 BPM | RESPIRATION RATE: 16 BRPM | OXYGEN SATURATION: 100 %

## 2017-11-16 DIAGNOSIS — Z98.89 OTHER SPECIFIED POSTPROCEDURAL STATES: Chronic | ICD-10-CM

## 2017-11-16 PROCEDURE — 99053 MED SERV 10PM-8AM 24 HR FAC: CPT

## 2017-11-16 PROCEDURE — 99285 EMERGENCY DEPT VISIT HI MDM: CPT | Mod: 25

## 2017-11-16 RX ORDER — ACETAMINOPHEN 500 MG
975 TABLET ORAL ONCE
Qty: 0 | Refills: 0 | Status: COMPLETED | OUTPATIENT
Start: 2017-11-16 | End: 2017-11-16

## 2017-11-16 RX ORDER — DIAZEPAM 5 MG
5 TABLET ORAL ONCE
Qty: 0 | Refills: 0 | Status: DISCONTINUED | OUTPATIENT
Start: 2017-11-16 | End: 2017-11-16

## 2017-11-16 RX ORDER — IBUPROFEN 200 MG
600 TABLET ORAL ONCE
Qty: 0 | Refills: 0 | Status: COMPLETED | OUTPATIENT
Start: 2017-11-16 | End: 2017-11-16

## 2017-11-16 NOTE — ED ADULT NURSE NOTE - CHPI ED SYMPTOMS NEG
no disorientation/no headache/no bruising/no sleeping issues/no fussiness/no laceration/no crying/no decreased eating/drinking/no difficulty bearing weight/no loss of consciousness/no neck tenderness/no dizziness
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
## 2017-11-17 DIAGNOSIS — Z29.9 ENCOUNTER FOR PROPHYLACTIC MEASURES, UNSPECIFIED: ICD-10-CM

## 2017-11-17 DIAGNOSIS — R29.898 OTHER SYMPTOMS AND SIGNS INVOLVING THE MUSCULOSKELETAL SYSTEM: ICD-10-CM

## 2017-11-17 DIAGNOSIS — M54.9 DORSALGIA, UNSPECIFIED: ICD-10-CM

## 2017-11-17 DIAGNOSIS — M54.41 LUMBAGO WITH SCIATICA, RIGHT SIDE: ICD-10-CM

## 2017-11-17 DIAGNOSIS — M54.40 LUMBAGO WITH SCIATICA, UNSPECIFIED SIDE: ICD-10-CM

## 2017-11-17 DIAGNOSIS — M50.20 OTHER CERVICAL DISC DISPLACEMENT, UNSPECIFIED CERVICAL REGION: ICD-10-CM

## 2017-11-17 LAB
ALBUMIN SERPL ELPH-MCNC: 4.4 G/DL — SIGNIFICANT CHANGE UP (ref 3.3–5)
ALP SERPL-CCNC: 43 U/L — SIGNIFICANT CHANGE UP (ref 40–120)
ALT FLD-CCNC: 16 U/L RC — SIGNIFICANT CHANGE UP (ref 10–45)
ANION GAP SERPL CALC-SCNC: 13 MMOL/L — SIGNIFICANT CHANGE UP (ref 5–17)
AST SERPL-CCNC: 19 U/L — SIGNIFICANT CHANGE UP (ref 10–40)
BASOPHILS # BLD AUTO: 0.1 K/UL — SIGNIFICANT CHANGE UP (ref 0–0.2)
BASOPHILS NFR BLD AUTO: 1.2 % — SIGNIFICANT CHANGE UP (ref 0–2)
BILIRUB SERPL-MCNC: 0.2 MG/DL — SIGNIFICANT CHANGE UP (ref 0.2–1.2)
BUN SERPL-MCNC: 15 MG/DL — SIGNIFICANT CHANGE UP (ref 7–23)
CALCIUM SERPL-MCNC: 9.2 MG/DL — SIGNIFICANT CHANGE UP (ref 8.4–10.5)
CHLORIDE SERPL-SCNC: 103 MMOL/L — SIGNIFICANT CHANGE UP (ref 96–108)
CO2 SERPL-SCNC: 24 MMOL/L — SIGNIFICANT CHANGE UP (ref 22–31)
CREAT SERPL-MCNC: 1.17 MG/DL — SIGNIFICANT CHANGE UP (ref 0.5–1.3)
EOSINOPHIL # BLD AUTO: 0.1 K/UL — SIGNIFICANT CHANGE UP (ref 0–0.5)
EOSINOPHIL NFR BLD AUTO: 2.9 % — SIGNIFICANT CHANGE UP (ref 0–6)
GLUCOSE SERPL-MCNC: 103 MG/DL — HIGH (ref 70–99)
HCT VFR BLD CALC: 39.1 % — SIGNIFICANT CHANGE UP (ref 34.5–45)
HGB BLD-MCNC: 13.4 G/DL — SIGNIFICANT CHANGE UP (ref 11.5–15.5)
LYMPHOCYTES # BLD AUTO: 2.2 K/UL — SIGNIFICANT CHANGE UP (ref 1–3.3)
LYMPHOCYTES # BLD AUTO: 44.8 % — HIGH (ref 13–44)
MCHC RBC-ENTMCNC: 31.8 PG — SIGNIFICANT CHANGE UP (ref 27–34)
MCHC RBC-ENTMCNC: 34.3 GM/DL — SIGNIFICANT CHANGE UP (ref 32–36)
MCV RBC AUTO: 92.7 FL — SIGNIFICANT CHANGE UP (ref 80–100)
MONOCYTES # BLD AUTO: 0.5 K/UL — SIGNIFICANT CHANGE UP (ref 0–0.9)
MONOCYTES NFR BLD AUTO: 11.2 % — SIGNIFICANT CHANGE UP (ref 2–14)
NEUTROPHILS # BLD AUTO: 2 K/UL — SIGNIFICANT CHANGE UP (ref 1.8–7.4)
NEUTROPHILS NFR BLD AUTO: 40 % — LOW (ref 43–77)
PLATELET # BLD AUTO: 252 K/UL — SIGNIFICANT CHANGE UP (ref 150–400)
POTASSIUM SERPL-MCNC: 4.2 MMOL/L — SIGNIFICANT CHANGE UP (ref 3.5–5.3)
POTASSIUM SERPL-SCNC: 4.2 MMOL/L — SIGNIFICANT CHANGE UP (ref 3.5–5.3)
PROT SERPL-MCNC: 7.1 G/DL — SIGNIFICANT CHANGE UP (ref 6–8.3)
RBC # BLD: 4.22 M/UL — SIGNIFICANT CHANGE UP (ref 3.8–5.2)
RBC # FLD: 11.8 % — SIGNIFICANT CHANGE UP (ref 10.3–14.5)
SODIUM SERPL-SCNC: 140 MMOL/L — SIGNIFICANT CHANGE UP (ref 135–145)
WBC # BLD: 4.9 K/UL — SIGNIFICANT CHANGE UP (ref 3.8–10.5)
WBC # FLD AUTO: 4.9 K/UL — SIGNIFICANT CHANGE UP (ref 3.8–10.5)

## 2017-11-17 PROCEDURE — 99223 1ST HOSP IP/OBS HIGH 75: CPT

## 2017-11-17 PROCEDURE — 72131 CT LUMBAR SPINE W/O DYE: CPT | Mod: 26

## 2017-11-17 PROCEDURE — 93010 ELECTROCARDIOGRAM REPORT: CPT

## 2017-11-17 PROCEDURE — 72125 CT NECK SPINE W/O DYE: CPT | Mod: 26

## 2017-11-17 PROCEDURE — 72128 CT CHEST SPINE W/O DYE: CPT | Mod: 26

## 2017-11-17 RX ORDER — CELECOXIB 200 MG/1
100 CAPSULE ORAL
Qty: 0 | Refills: 0 | Status: DISCONTINUED | OUTPATIENT
Start: 2017-11-17 | End: 2017-11-23

## 2017-11-17 RX ORDER — MORPHINE SULFATE 50 MG/1
2 CAPSULE, EXTENDED RELEASE ORAL ONCE
Qty: 0 | Refills: 0 | Status: DISCONTINUED | OUTPATIENT
Start: 2017-11-17 | End: 2017-11-17

## 2017-11-17 RX ORDER — INFLUENZA VIRUS VACCINE 15; 15; 15; 15 UG/.5ML; UG/.5ML; UG/.5ML; UG/.5ML
0.5 SUSPENSION INTRAMUSCULAR ONCE
Qty: 0 | Refills: 0 | Status: DISCONTINUED | OUTPATIENT
Start: 2017-11-17 | End: 2017-11-23

## 2017-11-17 RX ORDER — LIDOCAINE 4 G/100G
1 CREAM TOPICAL ONCE
Qty: 0 | Refills: 0 | Status: COMPLETED | OUTPATIENT
Start: 2017-11-17 | End: 2017-11-17

## 2017-11-17 RX ORDER — MORPHINE SULFATE 50 MG/1
4 CAPSULE, EXTENDED RELEASE ORAL ONCE
Qty: 0 | Refills: 0 | Status: DISCONTINUED | OUTPATIENT
Start: 2017-11-17 | End: 2017-11-17

## 2017-11-17 RX ORDER — DOCUSATE SODIUM 100 MG
100 CAPSULE ORAL THREE TIMES A DAY
Qty: 0 | Refills: 0 | Status: DISCONTINUED | OUTPATIENT
Start: 2017-11-17 | End: 2017-11-23

## 2017-11-17 RX ORDER — OXYCODONE HYDROCHLORIDE 5 MG/1
1 TABLET ORAL
Qty: 20 | Refills: 0 | OUTPATIENT
Start: 2017-11-17 | End: 2017-11-22

## 2017-11-17 RX ORDER — CYCLOBENZAPRINE HYDROCHLORIDE 10 MG/1
10 TABLET, FILM COATED ORAL THREE TIMES A DAY
Qty: 0 | Refills: 0 | Status: DISCONTINUED | OUTPATIENT
Start: 2017-11-17 | End: 2017-11-18

## 2017-11-17 RX ORDER — CYCLOBENZAPRINE HYDROCHLORIDE 10 MG/1
1 TABLET, FILM COATED ORAL
Qty: 0 | Refills: 0 | COMMUNITY

## 2017-11-17 RX ORDER — KETOROLAC TROMETHAMINE 30 MG/ML
30 SYRINGE (ML) INJECTION ONCE
Qty: 0 | Refills: 0 | Status: DISCONTINUED | OUTPATIENT
Start: 2017-11-17 | End: 2017-11-17

## 2017-11-17 RX ORDER — POLYETHYLENE GLYCOL 3350 17 G/17G
17 POWDER, FOR SOLUTION ORAL DAILY
Qty: 0 | Refills: 0 | Status: DISCONTINUED | OUTPATIENT
Start: 2017-11-17 | End: 2017-11-20

## 2017-11-17 RX ORDER — HEPARIN SODIUM 5000 [USP'U]/ML
5000 INJECTION INTRAVENOUS; SUBCUTANEOUS EVERY 8 HOURS
Qty: 0 | Refills: 0 | Status: DISCONTINUED | OUTPATIENT
Start: 2017-11-17 | End: 2017-11-23

## 2017-11-17 RX ORDER — ACETAMINOPHEN 500 MG
1000 TABLET ORAL ONCE
Qty: 0 | Refills: 0 | Status: COMPLETED | OUTPATIENT
Start: 2017-11-17 | End: 2017-11-17

## 2017-11-17 RX ORDER — MORPHINE SULFATE 50 MG/1
3 CAPSULE, EXTENDED RELEASE ORAL EVERY 4 HOURS
Qty: 0 | Refills: 0 | Status: DISCONTINUED | OUTPATIENT
Start: 2017-11-17 | End: 2017-11-18

## 2017-11-17 RX ORDER — SENNA PLUS 8.6 MG/1
1 TABLET ORAL DAILY
Qty: 0 | Refills: 0 | Status: DISCONTINUED | OUTPATIENT
Start: 2017-11-17 | End: 2017-11-23

## 2017-11-17 RX ORDER — CELECOXIB 200 MG/1
1 CAPSULE ORAL
Qty: 0 | Refills: 0 | COMMUNITY

## 2017-11-17 RX ORDER — MORPHINE SULFATE 50 MG/1
2 CAPSULE, EXTENDED RELEASE ORAL
Qty: 0 | Refills: 0 | Status: DISCONTINUED | OUTPATIENT
Start: 2017-11-17 | End: 2017-11-20

## 2017-11-17 RX ORDER — DEXAMETHASONE 0.5 MG/5ML
10 ELIXIR ORAL ONCE
Qty: 0 | Refills: 0 | Status: COMPLETED | OUTPATIENT
Start: 2017-11-17 | End: 2017-11-17

## 2017-11-17 RX ADMIN — Medication 100 MILLIGRAM(S): at 21:20

## 2017-11-17 RX ADMIN — Medication 975 MILLIGRAM(S): at 00:00

## 2017-11-17 RX ADMIN — Medication 600 MILLIGRAM(S): at 01:16

## 2017-11-17 RX ADMIN — HEPARIN SODIUM 5000 UNIT(S): 5000 INJECTION INTRAVENOUS; SUBCUTANEOUS at 15:57

## 2017-11-17 RX ADMIN — LIDOCAINE 1 PATCH: 4 CREAM TOPICAL at 18:20

## 2017-11-17 RX ADMIN — CELECOXIB 100 MILLIGRAM(S): 200 CAPSULE ORAL at 21:50

## 2017-11-17 RX ADMIN — SENNA PLUS 1 TABLET(S): 8.6 TABLET ORAL at 12:56

## 2017-11-17 RX ADMIN — MORPHINE SULFATE 4 MILLIGRAM(S): 50 CAPSULE, EXTENDED RELEASE ORAL at 06:39

## 2017-11-17 RX ADMIN — MORPHINE SULFATE 3 MILLIGRAM(S): 50 CAPSULE, EXTENDED RELEASE ORAL at 16:27

## 2017-11-17 RX ADMIN — MORPHINE SULFATE 2 MILLIGRAM(S): 50 CAPSULE, EXTENDED RELEASE ORAL at 01:44

## 2017-11-17 RX ADMIN — MORPHINE SULFATE 3 MILLIGRAM(S): 50 CAPSULE, EXTENDED RELEASE ORAL at 16:45

## 2017-11-17 RX ADMIN — MORPHINE SULFATE 2 MILLIGRAM(S): 50 CAPSULE, EXTENDED RELEASE ORAL at 02:00

## 2017-11-17 RX ADMIN — MORPHINE SULFATE 3 MILLIGRAM(S): 50 CAPSULE, EXTENDED RELEASE ORAL at 23:45

## 2017-11-17 RX ADMIN — Medication 30 MILLIGRAM(S): at 08:08

## 2017-11-17 RX ADMIN — HEPARIN SODIUM 5000 UNIT(S): 5000 INJECTION INTRAVENOUS; SUBCUTANEOUS at 21:20

## 2017-11-17 RX ADMIN — MORPHINE SULFATE 3 MILLIGRAM(S): 50 CAPSULE, EXTENDED RELEASE ORAL at 23:15

## 2017-11-17 RX ADMIN — Medication 975 MILLIGRAM(S): at 01:16

## 2017-11-17 RX ADMIN — MORPHINE SULFATE 2 MILLIGRAM(S): 50 CAPSULE, EXTENDED RELEASE ORAL at 02:06

## 2017-11-17 RX ADMIN — POLYETHYLENE GLYCOL 3350 17 GRAM(S): 17 POWDER, FOR SOLUTION ORAL at 21:20

## 2017-11-17 RX ADMIN — Medication 102 MILLIGRAM(S): at 02:56

## 2017-11-17 RX ADMIN — Medication 400 MILLIGRAM(S): at 08:07

## 2017-11-17 RX ADMIN — Medication 600 MILLIGRAM(S): at 00:00

## 2017-11-17 RX ADMIN — Medication 1000 MILLIGRAM(S): at 08:37

## 2017-11-17 RX ADMIN — Medication 5 MILLIGRAM(S): at 00:00

## 2017-11-17 RX ADMIN — CELECOXIB 100 MILLIGRAM(S): 200 CAPSULE ORAL at 21:20

## 2017-11-17 RX ADMIN — MORPHINE SULFATE 2 MILLIGRAM(S): 50 CAPSULE, EXTENDED RELEASE ORAL at 02:28

## 2017-11-17 NOTE — CONSULT NOTE ADULT - ATTENDING COMMENTS
Agree with above.   -Check 12 lead ECG  -If 12 lead ECG normal, no further cardiac workup needed.  Recent ischemic eval in my office normal.    Valentín Polk MD  [facility]  [address] [place] [address], [id number]  [place] [address]  office: [phone]  pager: [phone]

## 2017-11-17 NOTE — H&P ADULT - PROBLEM SELECTOR PLAN 1
Pt pw with acute back pain, radiating down to the LEs, associated with paresthesias and weakness of LEs. Has required multiple doses of NSAIDs, narcotics for pain control in ED but pain still not optimally controlled. CT spines not remarkable for any acute etiology.   - start morphine 3mg q4hr prn for moderate pain  - start morphine 2mg q2hr prn for severe pain  - start lidoderm patch in lumbar region  - continue celecoxib 100mg BID  - continue flexeril 10mg TID  - appreciate orthopedic consult Pt pw with acute back pain, radiating down to the LEs, associated with paresthesias and weakness of LEs. Has required multiple doses of NSAIDs, narcotics for pain control in ED but pain still not optimally controlled. CT spines not remarkable for any acute etiology. Although pt with known chronic pain, given the acuity of the pain, not controlled on the pain meds provided, will appreciate ortho inputs.  - start morphine 3mg q4hr prn for moderate pain  - start morphine 2mg q2hr prn for severe pain  - start lidoderm patch in lumbar region  - continue celecoxib 100mg BID  - continue flexeril 10mg TID  - appreciate orthopedic consult Pt pw with acute back pain, radiating down to the LEs, associated with paresthesias and weakness of LEs. Has required multiple doses of NSAIDs, narcotics for pain control in ED but pain still not optimally controlled. CT spines not remarkable for any acute etiology. Although pt with known chronic pain, given the acuity of the pain, not controlled on the pain meds provided, will appreciate ortho inputs. Care discussed with Dr. Ascencio (ortho)  - start morphine 3mg q4hr prn for moderate pain  - start morphine 2mg q2hr prn for severe pain  - start lidoderm patch in lumbar region  - continue celecoxib 100mg BID  - continue flexeril 10mg TID  - appreciate orthopedic consult

## 2017-11-17 NOTE — H&P ADULT - NSHPREVIEWOFSYSTEMS_GEN_ALL_CORE
Review of Systems:   CONSTITUTIONAL: No fever, weight loss, or fatigue  EYES: No eye pain, visual disturbances, or discharge  ENMT:  No difficulty hearing, tinnitus, vertigo; No sinus or throat pain  NECK: (+) chronic stiffness  BREASTS: No pain, masses, or nipple discharge  RESPIRATORY: No cough, wheezing, chills or hemoptysis; No shortness of breath  CARDIOVASCULAR: No chest pain, palpitations, dizziness, or leg swelling  GASTROINTESTINAL: No abdominal or epigastric pain. No nausea, vomiting, or hematemesis; No diarrhea or constipation. No melena or hematochezia.  GENITOURINARY: No dysuria, frequency, hematuria, or incontinence  NEUROLOGICAL: (+) tingling in the feet, especially with weight-bearing. No headaches, memory loss, loss of strength, numbness, or tremors  SKIN: No itching, burning, rashes, or lesions   LYMPH NODES: No enlarged glands  ENDOCRINE: No heat or cold intolerance; No hair loss  MUSCULOSKELETAL: (+) low back pain, radiating down to the legs  PSYCHIATRIC: No depression, anxiety, mood swings, or difficulty sleeping  HEME/LYMPH: No easy bruising, or bleeding gums  ALLERGY AND IMMUNOLOGIC: No hives or eczema

## 2017-11-17 NOTE — H&P ADULT - PROBLEM SELECTOR PLAN 3
Pt with chronic LBP with sciatica. Plan as above Pt with chronic LBP with sciatica. Patient on lyrica, celecoxib, flexeril at home for pain control. ISTOP Reference #: [id number].  Plan as above

## 2017-11-17 NOTE — H&P ADULT - NSHPLABSRESULTS_GEN_ALL_CORE
LABS:                         13.4   4.9   )-----------( 252      ( 17 Nov 2017 00:17 )             39.1     11-17    140  |  103  |  15  ----------------------------<  103<H>  4.2   |  24  |  1.17    Ca    9.2      17 Nov 2017 00:17    TPro  7.1  /  Alb  4.4  /  TBili  0.2  /  DBili  x   /  AST  19  /  ALT  16  /  AlkPhos  43  11-17      Personally reviewed the labs  - H/H stable  - no leukocytosis  - BMP wnl    Personally reviewed the imaging:  - CT c/s, t/s, l/s:   1. No fracture or traumatic spondylolisthesis in the cervical, thoracic   or lumbar spine.  2. Multilevel degenerative changes of the cervical and lumbar spine
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
## 2017-11-17 NOTE — ED PROVIDER NOTE - PLAN OF CARE
Follow up with your Primary Care Physician within the next 2-3 days  Bring a copy of your test results with you to your appointment  Continue your current medication regimen  Return to the Emergency Room if you experience new or worsening symptoms  Take Motrin 400-600mg every 6 hrs as needed for pain. Take with food  Take Tylenol 650mg every 6 hrs as needed for pain.  Take valium 5mg (1 tablet) every 8 hrs as needed for pain. Do not drink alcohol or drive after taking valium. 1. Return to ED for worsening, progressive or any other concerning symptoms   2. Follow up with your primary care doctor in 2-3days  3. Follow up with the spine clinic at [phone].   4. Take motrin 600mg every 6 hours as needed for pain and or Take Tylenol up to 650 mg every 6 hours as needed for pain.   4. Take oxycodone 5 mg every 6 hours as needed for severe pain; do not drink alcohol while taking this medication. DO NOT mix with Valium or Dilaudid as the combination can cause respiratory depression and significant somnolence.

## 2017-11-17 NOTE — ED ADULT NURSE REASSESSMENT NOTE - NS ED NURSE REASSESS COMMENT FT1
Patient is resting quietly, no acute distress noted.
pt resting comfortably in bed with no sign of discomfort noted at present.
received report from cheo pion rn in red. pt found laying in bed with side rails up. assisted to bathroom, pt ambulating well, states she feels like her right was giving out.   able to help herself in bathroom, vital signs stable, understands she is waiting on admission.
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
## 2017-11-17 NOTE — ED ADULT NURSE REASSESSMENT NOTE - GENERAL PATIENT STATE
comfortable appearance/cooperative/smiling/interactive
resting/sleeping/family/SO at bedside/comfortable appearance
cooperative/resting/sleeping/comfortable appearance
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
## 2017-11-17 NOTE — H&P ADULT - PROBLEM SELECTOR PLAN 4
Pt complains of weakness in LEs, feeling unsteady in her feet and heaviness in her legs; afraid of ambulation as she might fall. Likely related to her LBP and associated sciatica. No signs/symptoms saddle anesthesia, urinary/fecal incontinence, objective finding of LE weakness or sensation loss on exam.   - pain control as above  - physical therapy evaluation

## 2017-11-17 NOTE — ED PROVIDER NOTE - FAMILY HISTORY
Family history of heart disease, (mother)     Sibling  Still living? Unknown  Family history of asthma, Age at diagnosis: Age Unknown

## 2017-11-17 NOTE — ED PROVIDER NOTE - CARE PLAN
Principal Discharge DX:	Back pain  Instructions for follow-up, activity and diet:	Follow up with your Primary Care Physician within the next 2-3 days  Bring a copy of your test results with you to your appointment  Continue your current medication regimen  Return to the Emergency Room if you experience new or worsening symptoms  Take Motrin 400-600mg every 6 hrs as needed for pain. Take with food  Take Tylenol 650mg every 6 hrs as needed for pain.  Take valium 5mg (1 tablet) every 8 hrs as needed for pain. Do not drink alcohol or drive after taking valium. Principal Discharge DX:	Back pain  Instructions for follow-up, activity and diet:	1. Return to ED for worsening, progressive or any other concerning symptoms   2. Follow up with your primary care doctor in 2-3days  3. Follow up with the spine clinic at [phone].   4. Take motrin 600mg every 6 hours as needed for pain and or Take Tylenol up to 650 mg every 6 hours as needed for pain.   4. Take oxycodone 5 mg every 6 hours as needed for severe pain; do not drink alcohol while taking this medication. DO NOT mix with Valium or Dilaudid as the combination can cause respiratory depression and significant somnolence.

## 2017-11-17 NOTE — PATIENT PROFILE ADULT. - NSTOBACCONEVERSMOKERY/N_GEN_A
[facility]  [address]  [facility] [address]  Phone: [phone]  Fax: [phone]                  Alfredina Claudette Parks   2017 12:40 PM   Office Visit    Description:  Female : 1944   Provider:  Veronica Frost MD   Department:  [facility]           Reason for Visit     Asthma           Diagnoses this Visit        Comments    Bulging lumbar disc    -  Primary     Hypothyroidism, unspecified type         HTN (hypertension), benign         Congestive heart failure, unspecified congestive heart failure chronicity, unspecified congestive heart failure type         Atrial fibrillation, unspecified type         Chronic hepatitis C without hepatic coma                To Do List           Future Appointments        Provider Department Dept Phone    2017 10:20 AM Genaro Cordero MD [place] [facility] [phone]      Goals (5 Years of Data)     None      Follow-Up and Disposition     Return if symptoms worsen or fail to improve.       These Medications        Disp Refills Start End    epinephrine (EPIPEN) 0.3 mg/0.3 mL AtIn 1 mL 3 2017    Inject 0.3 mLs (0.3 mg total) into the muscle once. - Intramuscular    Pharmacy: [id number] - [place], [place] - [address] AT [facility] Ph #: [phone]       azithromycin (Z-COLTEN) 250 MG tablet 6 tablet 0 2017    Take 2 tablets by mouth on day 1; Take 1 tablet by mouth on days 2-5    Pharmacy: [id number] - [place], [place] - [address] AT [facility] Ph #: [phone]         [facility]     [facility] -  Assistance  Registered nurses in the [facility] Call Center provide clinical advisement, health education, appointment booking, and other advisory services.  Call for this free service at [phone].             Medications           Message  regarding Medications     Verify the changes and/or additions to your medication regime listed below are the same as discussed with your clinician today.  If any of these changes or additions are incorrect, please notify your healthcare provider.        START taking these NEW medications        Refills    azithromycin (Z-COLTEN) 250 MG tablet 0    Sig: Take 2 tablets by mouth on day 1; Take 1 tablet by mouth on days 2-5    Class: Normal      CHANGE how you are taking these medications     Start Taking Instead of    epinephrine (EPIPEN) 0.3 mg/0.3 mL AtIn epinephrine (EPIPEN) 0.3 mg/0.3 mL (1:1,000) AtIn    Dosage:  Inject 0.3 mLs (0.3 mg total) into the muscle once. Dosage:  Inject 0.3 mLs (0.3 mg total) into the muscle once.    Reason for Change:  Reorder            Verify that the below list of medications is an accurate representation of the medications you are currently taking.  If none reported, the list may be blank. If incorrect, please contact your healthcare provider. Carry this list with you in case of emergency.           Current Medications     ADVAIR DISKUS 250-50 mcg/dose diskus inhaler TAKE 1 PUFF PO BID    albuterol (PROVENTIL) 2.5 mg /3 mL (0.083 %) nebulizer solution TAKE 3ML BY NEBULIZATION EVERY 6 HOURS AS NEEDED FOR WHEEZING.    amlodipine (NORVASC) 5 MG tablet TK 1 T PO QD.    aspirin (ECOTRIN) 81 MG EC tablet Take 81 mg by mouth once daily.      benzonatate (TESSALON) 100 MG capsule TAKE 2 CAPSULES BY MOUTH THREE TIMES DAILY AS NEEDED FOR COUGH    azithromycin (Z-COLTEN) 250 MG tablet Take 2 tablets by mouth on day 1; Take 1 tablet by mouth on days 2-5    calcium carbonate-vitamin D3 (CALCIUM 600 + D,3,) 600 mg calcium- 200 unit Cap Take 1,200 mg by mouth once daily.    carvedilol (COREG) 12.5 MG tablet TAKE 1 TABLET BY MOUTH TWICE DAILY    diclofenac sodium 1 % Gel Apply 2 g topically 2 (two) times daily. To affected areas    diltiaZEM (CARDIZEM CD) 240 MG 24 hr capsule Take 1 capsule (240 mg  "total) by mouth once daily.    docusate sodium (COLACE) 100 MG capsule Take 1 capsule (100 mg total) by mouth 3 (three) times daily as needed for Constipation.    epinephrine (EPIPEN) 0.3 mg/0.3 mL AtIn Inject 0.3 mLs (0.3 mg total) into the muscle once.    esomeprazole (NEXIUM) 40 MG capsule Take 1 capsule (40 mg total) by mouth before breakfast.    fluticasone (FLONASE) 50 mcg/actuation nasal spray SPRAY ONCE IN EACH NOSTRIL EVERY DAY    fluticasone (FLONASE) 50 mcg/actuation nasal spray SPRAY ONCE IN EACH NOSTRIL EVERY DAY    fluticasone-salmeterol 500-50 mcg/dose (ADVAIR DISKUS) 500-50 mcg/dose DsDv diskus inhaler Inhale 1 puff into the lungs 2 (two) times daily.    hydrocodone-acetaminophen 7.5-325mg (NORCO) 7.5-325 mg per tablet Take 1 tablet by mouth every 6 (six) hours as needed for Pain.    levothyroxine (SYNTHROID) 50 MCG tablet TAKE 1 TABLET BY MOUTH EVERY DAY    lidocaine-prilocaine (EMLA) cream APPLY TOPICALLY AS NEEDED    montelukast (SINGULAIR) 10 mg tablet     nabumetone (RELAFEN) 500 MG tablet Take 1 tablet (500 mg total) by mouth 4 (four) times daily as needed for Pain.    NEXIUM 40 mg capsule TAKE 1 CAPSULE(40 MG) BY MOUTH DAILY AS NEEDED    oxybutynin (DITROPAN-XL) 5 MG TR24 TK 1 T PO  QD PRF  INCONTINENECE    PATADAY 0.2 % Drop INT 1 GTT IN OU QD    polyethylene glycol (GLYCOLAX) 17 gram PwPk Take 17 g by mouth daily as needed.    triamcinolone acetonide 0.025% (KENALOG) 0.025 % cream Apply topically 2 (two) times daily.    valsartan-hydrochlorothiazide (DIOVAN-HCT) 320-25 mg per tablet TAKE 1 TABLET BY MOUTH EVERY DAY    VENTOLIN HFA 90 mcg/actuation inhaler INHALE 2 PUFFS BY MOUTH EVERY 6 HOURS AS NEEDED           Clinical Reference Information           Vital Signs - Last Recorded  Most recent update: 1/13/2017 12:50 PM by Veronica Frost MD    BP Pulse Ht Wt SpO2 BMI    (!) 136/96 83 4' 10" (1.473 m) 81.2 kg (179 lb 0.2 oz) 97% 37.41 kg/m2      Blood Pressure          Most Recent Value "    BP  (!)  136/96      Allergies as of 1/13/2017     Milk Containing Products    Nuts [Tree Nut]    Fosamax [Alendronate]      Immunizations Administered on Date of Encounter - 1/13/2017     None      Orders Placed During Today's Visit     Future Labs/Procedures Expected by Expires    CBC auto differential  1/13/2017 1/13/2018    Comprehensive metabolic panel  1/13/2017 1/13/2018    Ferritin  1/13/2017 1/13/2018    Hemoglobin A1c  1/13/2017 1/13/2018    HEPATITIS C RNA, QUANTITATIVE, PCR  1/13/2017 3/14/2018    Iron and TIBC  1/13/2017 1/13/2018    Lipid panel  1/13/2017 1/13/2018    T4, free  1/13/2017 3/14/2018    TSH  1/13/2017 1/13/2018    Vitamin D  1/13/2017 1/13/2018      [facility] Sign-Up     Activating your [facility] account is as easy as 1-2-3!     1) Visit [email or web address], select Sign Up Now, enter this activation code and your date of birth, then select Next.  Activation code not generated  Current Patient Portal Status: Account disabled      2) Create a username and password to use when you visit [facility] in the future and select a security question in case you lose your password and select Next.    3) Enter your e-mail address and click Sign Up!    Additional Information  If you have questions, please e-mail [email or web address] or call [phone] to talk to our [facility] staff. Remember, [facility] is NOT to be used for urgent needs. For medical emergencies, dial 911.          No
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
## 2017-11-17 NOTE — ED PROVIDER NOTE - PROGRESS NOTE DETAILS
[facility] ***ATTENDING ADDENDUM (Dr. Dallas Fields): I have received handoff from Nawaf. Agree with goals/plan of ED care. Awaiting completion of ED diagnostics and reassessment following therapeutics. May require MRI, observation v. admission, and additional therapeutics; Will continue to observe and monitor closely. **ATTENDING ADDENDUM (Dr. Dallas Fields): patient serially evaluated throughout ED course. NO acute deterioration up to this time in the ED. Reports slight improvement in spasm, but still with pain. Will provide narcotic analgesics for acute pain. Has required inpatient admission for therapeutic intensity in May 2017. Will continue to observe and monitor closely. Anticipatory guidance provided. **ATTENDING ADDENDUM (Dr. Dallas Fields): patient serially evaluated throughout ED course. Patient with pain refractory to narcotic analgesics. Will try corticosteorids and reassess. back pain feeling much better, able to ambulate, will d/c with a short course of oxycodone for acute pain and have Follow up with spine specialist -Kyra MARSHALL patient has been sleeping comfortably the entire time without any distress, on RN attempting to D/C patient now stating pain is 8/10 and she cannot go home. Also stating her legs are still buckling though patient was able to walk to the bathroom without assistance per RN. Spine specialist Wil[facility], [facility] and admit for pain control -Kyra MARSHALL

## 2017-11-17 NOTE — H&P ADULT - NSHPSOCIALHISTORY_GEN_ALL_CORE
Denies use of etoh, cigarettes, or recreational drugs  Lives with two children  Currently not working
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
## 2017-11-17 NOTE — H&P ADULT - NSHPPHYSICALEXAM_GEN_ALL_CORE
Vital Signs Last 24 Hrs  T(C): 36.8 (17 Nov 2017 07:48), Max: 36.8 (16 Nov 2017 23:53)  T(F): 98.2 (17 Nov 2017 07:48), Max: 98.2 (16 Nov 2017 23:53)  HR: 73 (17 Nov 2017 07:48) (71 - 83)  BP: 112/74 (17 Nov 2017 07:48) (112/74 - 127/86)  BP(mean): --  RR: 18 (17 Nov 2017 07:48) (16 - 18)  SpO2: 98% (17 Nov 2017 07:48) (98% - 100%)  CAPILLARY BLOOD GLUCOSE        I&O's Summary      PHYSICAL EXAM:  GENERAL: NAD, well-developed  HEAD:  Atraumatic, Normocephalic  EYES: EOMI, PERRLA, conjunctiva and sclera clear  MOUTH: no oral thrush  NECK: Supple, No JVD  CHEST/LUNG: Clear to auscultation bilaterally; No wheeze  HEART: Regular rate and rhythm; No murmurs, rubs, or gallops  ABDOMEN: Soft, Nontender, Nondistended; Bowel sounds present  BACK: mild tenderness to palpate in L5 region - pt reports paresthesia in her feet with palpation  EXTREMITIES:  2+ Peripheral Pulses, No clubbing, cyanosis, or edema  NEUROLOGY: AAOx3, non-focal; motor strength 4/5 in RLE, 5/5 in all other extremities; sensation intact  SKIN: No rashes or lesions
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
## 2017-11-18 ENCOUNTER — TRANSCRIPTION ENCOUNTER (OUTPATIENT)
Age: 37
End: 2017-11-18

## 2017-11-18 DIAGNOSIS — Z02.9 ENCOUNTER FOR ADMINISTRATIVE EXAMINATIONS, UNSPECIFIED: ICD-10-CM

## 2017-11-18 LAB
ANION GAP SERPL CALC-SCNC: 13 MMOL/L — SIGNIFICANT CHANGE UP (ref 5–17)
BUN SERPL-MCNC: 17 MG/DL — SIGNIFICANT CHANGE UP (ref 7–23)
CALCIUM SERPL-MCNC: 8.8 MG/DL — SIGNIFICANT CHANGE UP (ref 8.4–10.5)
CHLORIDE SERPL-SCNC: 102 MMOL/L — SIGNIFICANT CHANGE UP (ref 96–108)
CO2 SERPL-SCNC: 24 MMOL/L — SIGNIFICANT CHANGE UP (ref 22–31)
CREAT SERPL-MCNC: 1.07 MG/DL — SIGNIFICANT CHANGE UP (ref 0.5–1.3)
GLUCOSE SERPL-MCNC: 103 MG/DL — HIGH (ref 70–99)
POTASSIUM SERPL-MCNC: 3.6 MMOL/L — SIGNIFICANT CHANGE UP (ref 3.5–5.3)
POTASSIUM SERPL-SCNC: 3.6 MMOL/L — SIGNIFICANT CHANGE UP (ref 3.5–5.3)
SODIUM SERPL-SCNC: 139 MMOL/L — SIGNIFICANT CHANGE UP (ref 135–145)

## 2017-11-18 PROCEDURE — 99233 SBSQ HOSP IP/OBS HIGH 50: CPT

## 2017-11-18 RX ORDER — CYCLOBENZAPRINE HYDROCHLORIDE 10 MG/1
10 TABLET, FILM COATED ORAL THREE TIMES A DAY
Qty: 0 | Refills: 0 | Status: DISCONTINUED | OUTPATIENT
Start: 2017-11-18 | End: 2017-11-20

## 2017-11-18 RX ORDER — MORPHINE SULFATE 50 MG/1
1 CAPSULE, EXTENDED RELEASE ORAL
Qty: 30 | Refills: 0 | OUTPATIENT
Start: 2017-11-18 | End: 2017-11-23

## 2017-11-18 RX ORDER — MORPHINE SULFATE 50 MG/1
15 CAPSULE, EXTENDED RELEASE ORAL EVERY 4 HOURS
Qty: 0 | Refills: 0 | Status: DISCONTINUED | OUTPATIENT
Start: 2017-11-18 | End: 2017-11-22

## 2017-11-18 RX ADMIN — MORPHINE SULFATE 2 MILLIGRAM(S): 50 CAPSULE, EXTENDED RELEASE ORAL at 22:17

## 2017-11-18 RX ADMIN — CELECOXIB 100 MILLIGRAM(S): 200 CAPSULE ORAL at 11:28

## 2017-11-18 RX ADMIN — MORPHINE SULFATE 2 MILLIGRAM(S): 50 CAPSULE, EXTENDED RELEASE ORAL at 22:32

## 2017-11-18 RX ADMIN — POLYETHYLENE GLYCOL 3350 17 GRAM(S): 17 POWDER, FOR SOLUTION ORAL at 11:28

## 2017-11-18 RX ADMIN — Medication 100 MILLIGRAM(S): at 13:08

## 2017-11-18 RX ADMIN — CELECOXIB 100 MILLIGRAM(S): 200 CAPSULE ORAL at 11:58

## 2017-11-18 RX ADMIN — HEPARIN SODIUM 5000 UNIT(S): 5000 INJECTION INTRAVENOUS; SUBCUTANEOUS at 22:15

## 2017-11-18 RX ADMIN — LIDOCAINE 1 PATCH: 4 CREAM TOPICAL at 05:20

## 2017-11-18 RX ADMIN — MORPHINE SULFATE 2 MILLIGRAM(S): 50 CAPSULE, EXTENDED RELEASE ORAL at 13:00

## 2017-11-18 RX ADMIN — MORPHINE SULFATE 2 MILLIGRAM(S): 50 CAPSULE, EXTENDED RELEASE ORAL at 16:30

## 2017-11-18 RX ADMIN — Medication 100 MILLIGRAM(S): at 05:12

## 2017-11-18 RX ADMIN — CELECOXIB 100 MILLIGRAM(S): 200 CAPSULE ORAL at 18:30

## 2017-11-18 RX ADMIN — CYCLOBENZAPRINE HYDROCHLORIDE 10 MILLIGRAM(S): 10 TABLET, FILM COATED ORAL at 13:08

## 2017-11-18 RX ADMIN — HEPARIN SODIUM 5000 UNIT(S): 5000 INJECTION INTRAVENOUS; SUBCUTANEOUS at 13:07

## 2017-11-18 RX ADMIN — SENNA PLUS 1 TABLET(S): 8.6 TABLET ORAL at 22:15

## 2017-11-18 RX ADMIN — CELECOXIB 100 MILLIGRAM(S): 200 CAPSULE ORAL at 18:03

## 2017-11-18 RX ADMIN — MORPHINE SULFATE 2 MILLIGRAM(S): 50 CAPSULE, EXTENDED RELEASE ORAL at 12:30

## 2017-11-18 RX ADMIN — HEPARIN SODIUM 5000 UNIT(S): 5000 INJECTION INTRAVENOUS; SUBCUTANEOUS at 05:12

## 2017-11-18 RX ADMIN — MORPHINE SULFATE 2 MILLIGRAM(S): 50 CAPSULE, EXTENDED RELEASE ORAL at 16:02

## 2017-11-18 RX ADMIN — CYCLOBENZAPRINE HYDROCHLORIDE 10 MILLIGRAM(S): 10 TABLET, FILM COATED ORAL at 22:15

## 2017-11-18 RX ADMIN — Medication 100 MILLIGRAM(S): at 22:15

## 2017-11-18 NOTE — DISCHARGE NOTE ADULT - MEDICATION SUMMARY - MEDICATIONS TO CHANGE
I will SWITCH the dose or number of times a day I take the medications listed below when I get home from the hospital:    Flexeril 10 mg oral tablet  -- 1 tab(s) by mouth 3 times a day

## 2017-11-18 NOTE — DISCHARGE NOTE ADULT - MEDICATION SUMMARY - MEDICATIONS TO TAKE
I will START or STAY ON the medications listed below when I get home from the hospital:    shower chair  -- 1   -- Indication: For shower    physical therapy  -- once a day   -- Indication: For Physical therapy    oxyCODONE-acetaminophen 5 mg-325 mg oral tablet  -- 1 tab(s) by mouth every 6 hours, As Needed -Moderate Pain (4 - 6) MDD:4 tab  -- Indication: For Pain    phenylephrine 0.25%-3% rectal ointment  -- 1 application rectally every 6 hours, As needed, pain or irritation  -- Indication: For Hemorrihodal     Lyrica 25 mg oral capsule  -- 1 cap(s) by mouth 2 times a day  -- Indication: For Pain    Senna 8.6 mg oral tablet  -- 1 tab(s) by mouth once a day (at bedtime)  -- Indication: For Constipation    Colace 100 mg oral capsule  -- 1 cap(s) by mouth 2 times a day  -- Indication: For Constipation     MiraLax oral powder for reconstitution  -- 17 gram(s) by mouth once a day  -- Indication: For Constipation    Flexeril 5 mg oral tablet  -- 1 tab(s) by mouth 3 times a day MDD:3 tab  -- Indication: For muscle spasm    cholecalciferol oral tablet  -- 2000 unit(s) by mouth once a day  -- Indication: For supplement

## 2017-11-18 NOTE — PHYSICAL THERAPY INITIAL EVALUATION ADULT - ADDITIONAL COMMENTS
As per pt. lives in [address] with 2 kids 8 and 10y/o 3 steps to get in . ambulates without assistive device  indoor/ outdoor.

## 2017-11-18 NOTE — DISCHARGE NOTE ADULT - PATIENT PORTAL LINK FT
“You can access the Follow[facility] Patient Portal, offered by [facility], by registering with the following website: [email or web address]”

## 2017-11-18 NOTE — DISCHARGE NOTE ADULT - CARE PROVIDER_API CALL
Carmen Ely), Orthopaedic Surgery Orthopaedics Surgery  [address] [place] [address]  Phone: [phone]  Fax: [phone]    Cruz Bowles), Orthopaedic Surgery  [address] [address]  Phone: [phone]  Fax: [phone]

## 2017-11-18 NOTE — DISCHARGE NOTE ADULT - ADDITIONAL INSTRUCTIONS
Follow up with your spine surgeon within one week  Follow up with your primary care physician within one week  Make appointments to follow up with your out patient physicians.  Bring all discharge paperwork including discharge medication list to your follow up appointments.

## 2017-11-18 NOTE — PROGRESS NOTE ADULT - SUBJECTIVE AND OBJECTIVE BOX
Patient is a 37y old  Female who presents with a chief complaint of Back Pain (17 Nov 2017 08:22)      SUBJECTIVE / OVERNIGHT EVENTS:  no acute events overnight. vss, afebrile. Pt reports that her pain is better controlled but whenever she stands up, she feels "heavy" in the back. She has required 2 prn doses of morphine 3mg IV. Denies fever/chills, abdominal pain, nausea/vomiting, dizziness, urinary/fecal incontinence    MEDICATIONS  (STANDING):  celecoxib 100 milliGRAM(s) Oral two times a day with meals  docusate sodium 100 milliGRAM(s) Oral three times a day  heparin  Injectable 5000 Unit(s) SubCutaneous every 8 hours  influenza   Vaccine 0.5 milliLiter(s) IntraMuscular once  polyethylene glycol 3350 17 Gram(s) Oral daily  senna 1 Tablet(s) Oral daily    MEDICATIONS  (PRN):  cyclobenzaprine 10 milliGRAM(s) Oral three times a day PRN Muscle Spasm  morphine  - Injectable 3 milliGRAM(s) IV Push every 4 hours PRN Moderate Pain (4 - 6)  morphine  - Injectable 2 milliGRAM(s) IV Push every 2 hours PRN Severe Pain (7 - 10)      CAPILLARY BLOOD GLUCOSE        I&O's Summary    17 Nov 2017 07:01  -  18 Nov 2017 07:00  --------------------------------------------------------  IN: 540 mL / OUT: 0 mL / NET: 540 mL        PHYSICAL EXAM:  Vital Signs Last 24 Hrs  T(C): 36.9 (18 Nov 2017 05:07), Max: 37.3 (17 Nov 2017 20:31)  T(F): 98.5 (18 Nov 2017 05:07), Max: 99.2 (17 Nov 2017 20:31)  HR: 73 (18 Nov 2017 05:07) (71 - 80)  BP: 106/71 (18 Nov 2017 05:07) (106/71 - 119/71)  BP(mean): --  RR: 18 (18 Nov 2017 05:07) (18 - 18)  SpO2: 98% (18 Nov 2017 05:07) (96% - 98%)    GENERAL: NAD, well-developed  HEAD:  Atraumatic, Normocephalic  EYES: EOMI, PERRLA, conjunctiva and sclera clear  NECK: Supple, No JVD  CHEST/LUNG: Clear to auscultation bilaterally; No wheeze  HEART: Regular rate and rhythm; No murmurs, rubs, or gallops  ABDOMEN: Soft, Nontender, Nondistended; Bowel sounds present  EXTREMITIES:  2+ Peripheral Pulses, No clubbing, cyanosis, or edema  NEUROLOGY: aaox;3 non-focal; motor/sensation intact in all extremities 5/5  SKIN: No rashes or lesions    LABS:                        13.4   4.9   )-----------( 252      ( 17 Nov 2017 00:17 )             39.1     11-17    140  |  103  |  15  ----------------------------<  103<H>  4.2   |  24  |  1.17    Ca    9.2      17 Nov 2017 00:17    TPro  7.1  /  Alb  4.4  /  TBili  0.2  /  DBili  x   /  AST  19  /  ALT  16  /  AlkPhos  43  11-17        RADIOLOGY & ADDITIONAL TESTS:    Imaging Personally Reviewed:    Consultant(s) Notes Reviewed:  cardiology, orthopedics    Care Discussed with Consultants/Other Providers:

## 2017-11-18 NOTE — DISCHARGE NOTE ADULT - SECONDARY DIAGNOSIS.
Herniated Cervical Disc Chronic bilateral low back pain with right-sided sciatica Weakness of both lower extremities
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
## 2017-11-18 NOTE — DISCHARGE NOTE ADULT - PLAN OF CARE
Pain will improve You were evaluated by orthopedics  CT of spines done - No fracture or traumatic spondylolisthesis in the cervical, thoracic   or lumbar spine. Multilevel degenerative changes of the cervical and lumbar spine as   described above.  Weight bearing as tolerated  Follow up with your spine surgeon  or may follow up with Dr. Villar Follow up with your spine surgeon Pain management with current regimen  Follow up with your spine surgeon Continue physical therapy

## 2017-11-18 NOTE — PROGRESS NOTE ADULT - PROBLEM SELECTOR PLAN 3
Pt with chronic LBP with sciatica. Patient on lyrica, celecoxib, flexeril at home for pain control. ISTOP Reference #: [id number].  Plan as above
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
## 2017-11-18 NOTE — PROGRESS NOTE ADULT - PROBLEM SELECTOR PLAN 1
Pt pw with acute back pain, radiating down to the LEs, associated with paresthesias and weakness of LEs. Has required multiple doses of NSAIDs, narcotics for pain control in ED but pain still not optimally controlled. CT spines not remarkable for any acute etiology. Although pt with known chronic pain, given the acuity of the pain, not controlled on the pain meds provided, will appreciate ortho inputs. Care discussed with Dr. Ascencio (ortho)  - start morphine 10mg q3hr prn for moderate pain  - continue morphine 2mg q2hr prn for severe pain  - continue lidoderm patch in lumbar region  - continue celecoxib 100mg BID  - continue flexeril 10mg TID  - appreciate orthopedic consult Pt pw with acute back pain, radiating down to the LEs, associated with paresthesias and weakness of LEs. Has required multiple doses of NSAIDs, narcotics for pain control in ED but pain still not optimally controlled. CT spines not remarkable for any acute etiology. Although pt with known chronic pain, given the acuity of the pain, not controlled on the pain meds provided, will appreciate ortho inputs. Care discussed with Dr. Ascencio (ortho)  - start morphine 15mg q4hr prn for moderate pain  - continue morphine 2mg q2hr prn for severe pain  - continue lidoderm patch in lumbar region  - continue celecoxib 100mg BID  - continue flexeril 10mg TID  - appreciate orthopedic consult Pt pw with acute back pain, radiating down to the LEs, associated with paresthesias and weakness of LEs. Has required multiple doses of NSAIDs, narcotics for pain control in ED but pain still not optimally controlled. CT spines not remarkable for any acute etiology. Although pt with known chronic pain, given the acuity of the pain, not controlled on the pain meds provided, will appreciate ortho inputs. Care discussed with Dr. Ascencio (ortho)  - start morphine 15mg PO q4hr prn for moderate pain  - continue morphine 2mg IV q2hr prn for severe pain  - continue lidoderm patch in lumbar region  - continue celecoxib 100mg BID  - continue flexeril 10mg TID  - appreciate orthopedic consult

## 2017-11-18 NOTE — DISCHARGE NOTE ADULT - CARE PLAN
Principal Discharge DX:	Acute back pain with sciatica, unspecified laterality  Goal:	Pain will improve  Instructions for follow-up, activity and diet:	You were evaluated by orthopedics  CT of spines done - No fracture or traumatic spondylolisthesis in the cervical, thoracic   or lumbar spine. Multilevel degenerative changes of the cervical and lumbar spine as   described above.  Weight bearing as tolerated  Follow up with your spine surgeon  or may follow up with Dr. Villar  Secondary Diagnosis:	Herniated Cervical Disc  Instructions for follow-up, activity and diet:	Follow up with your spine surgeon  Secondary Diagnosis:	Chronic bilateral low back pain with right-sided sciatica  Instructions for follow-up, activity and diet:	Pain management with current regimen  Follow up with your spine surgeon  Secondary Diagnosis:	Weakness of both lower extremities  Instructions for follow-up, activity and diet:	Continue physical therapy
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
## 2017-11-18 NOTE — DISCHARGE NOTE ADULT - CARE PROVIDERS DIRECT ADDRESSES
,[email or web address]

## 2017-11-18 NOTE — DISCHARGE NOTE ADULT - MEDICATION SUMMARY - MEDICATIONS TO STOP TAKING
I will STOP taking the medications listed below when I get home from the hospital:    celecoxib 100 mg oral capsule  -- 1 cap(s) by mouth 2 times a day

## 2017-11-19 PROCEDURE — 72158 MRI LUMBAR SPINE W/O & W/DYE: CPT | Mod: 26

## 2017-11-19 PROCEDURE — 99233 SBSQ HOSP IP/OBS HIGH 50: CPT

## 2017-11-19 RX ORDER — SODIUM CHLORIDE 9 MG/ML
1000 INJECTION INTRAMUSCULAR; INTRAVENOUS; SUBCUTANEOUS
Qty: 0 | Refills: 0 | Status: DISCONTINUED | OUTPATIENT
Start: 2017-11-19 | End: 2017-11-20

## 2017-11-19 RX ORDER — PHENYLEPHRINE-SHARK LIVER OIL-MINERAL OIL-PETROLATUM RECTAL OINTMENT
1 OINTMENT (GRAM) RECTAL ONCE
Qty: 0 | Refills: 0 | Status: COMPLETED | OUTPATIENT
Start: 2017-11-19 | End: 2017-11-19

## 2017-11-19 RX ADMIN — Medication 100 MILLIGRAM(S): at 21:48

## 2017-11-19 RX ADMIN — CELECOXIB 100 MILLIGRAM(S): 200 CAPSULE ORAL at 08:41

## 2017-11-19 RX ADMIN — HEPARIN SODIUM 5000 UNIT(S): 5000 INJECTION INTRAVENOUS; SUBCUTANEOUS at 21:48

## 2017-11-19 RX ADMIN — CYCLOBENZAPRINE HYDROCHLORIDE 10 MILLIGRAM(S): 10 TABLET, FILM COATED ORAL at 05:31

## 2017-11-19 RX ADMIN — CYCLOBENZAPRINE HYDROCHLORIDE 10 MILLIGRAM(S): 10 TABLET, FILM COATED ORAL at 16:13

## 2017-11-19 RX ADMIN — HEPARIN SODIUM 5000 UNIT(S): 5000 INJECTION INTRAVENOUS; SUBCUTANEOUS at 16:13

## 2017-11-19 RX ADMIN — SODIUM CHLORIDE 75 MILLILITER(S): 9 INJECTION INTRAMUSCULAR; INTRAVENOUS; SUBCUTANEOUS at 21:48

## 2017-11-19 RX ADMIN — CELECOXIB 100 MILLIGRAM(S): 200 CAPSULE ORAL at 08:11

## 2017-11-19 RX ADMIN — MORPHINE SULFATE 2 MILLIGRAM(S): 50 CAPSULE, EXTENDED RELEASE ORAL at 14:31

## 2017-11-19 RX ADMIN — Medication 100 MILLIGRAM(S): at 05:31

## 2017-11-19 RX ADMIN — Medication 100 MILLIGRAM(S): at 16:13

## 2017-11-19 RX ADMIN — CYCLOBENZAPRINE HYDROCHLORIDE 10 MILLIGRAM(S): 10 TABLET, FILM COATED ORAL at 21:48

## 2017-11-19 RX ADMIN — MORPHINE SULFATE 15 MILLIGRAM(S): 50 CAPSULE, EXTENDED RELEASE ORAL at 16:20

## 2017-11-19 RX ADMIN — CELECOXIB 100 MILLIGRAM(S): 200 CAPSULE ORAL at 18:04

## 2017-11-19 RX ADMIN — PHENYLEPHRINE-SHARK LIVER OIL-MINERAL OIL-PETROLATUM RECTAL OINTMENT 1 SUPPOSITORY(S): at 09:43

## 2017-11-19 RX ADMIN — SENNA PLUS 1 TABLET(S): 8.6 TABLET ORAL at 21:47

## 2017-11-19 RX ADMIN — POLYETHYLENE GLYCOL 3350 17 GRAM(S): 17 POWDER, FOR SOLUTION ORAL at 12:37

## 2017-11-19 NOTE — PROGRESS NOTE ADULT - PROBLEM SELECTOR PLAN 1
Pt pw with acute back pain, radiating down to the LEs, associated with paresthesias and weakness of LEs. Has required multiple doses of NSAIDs, narcotics for pain control in ED but pain still not optimally controlled. CT spines not remarkable for any acute etiology. Although pt with known chronic pain, given the acuity of the pain, not controlled on the pain meds provided as well as worsening LE weakness/unsteday gait as well as imbalance with excruciating shooting pain down the RLE, concerning for possible nerve impingmenet, appreciate ortho inputs. Care discussed with Dr. Ascencio (ortho)  - obtain MRI lumbar spine  - continue morphine 15mg PO q4hr prn for moderate pain  - continue morphine 2mg IV q2hr prn for severe pain  - continue lidoderm patch in lumbar region  - continue celecoxib 100mg BID  - continue flexeril 10mg TID  - appreciate orthopedic consult

## 2017-11-19 NOTE — PROGRESS NOTE ADULT - PROBLEM SELECTOR PLAN 4
Pt complains of weakness in LEs, feeling unsteady in her feet and heaviness in her legs; afraid of ambulation as she might fall. Likely related to her LBP and associated sciatica. No signs/symptoms saddle anesthesia, urinary/fecal incontinence, objective finding of LE weakness or sensation loss on exam. This morning, pt demonstrates unsteady gait, imbalance and RLE giving out with ambulation. She was able to walk 3 steps for me but lost her balance afterwards.  - MRI as above  - pain control as above  - physical therapy evaluation
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
## 2017-11-19 NOTE — PROGRESS NOTE ADULT - PROBLEM SELECTOR PLAN 3
Pt with chronic LBP with sciatica. Patient on lyrica, celecoxib, flexeril at home for pain control. ISTOP Reference #: [id number].  Plan as above
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
## 2017-11-20 PROCEDURE — 93010 ELECTROCARDIOGRAM REPORT: CPT

## 2017-11-20 PROCEDURE — 99233 SBSQ HOSP IP/OBS HIGH 50: CPT

## 2017-11-20 RX ORDER — SODIUM CHLORIDE 9 MG/ML
1000 INJECTION INTRAMUSCULAR; INTRAVENOUS; SUBCUTANEOUS
Qty: 0 | Refills: 0 | Status: DISCONTINUED | OUTPATIENT
Start: 2017-11-20 | End: 2017-11-23

## 2017-11-20 RX ORDER — LACTULOSE 10 G/15ML
10 SOLUTION ORAL EVERY 6 HOURS
Qty: 0 | Refills: 0 | Status: COMPLETED | OUTPATIENT
Start: 2017-11-20 | End: 2017-11-20

## 2017-11-20 RX ORDER — CYCLOBENZAPRINE HYDROCHLORIDE 10 MG/1
5 TABLET, FILM COATED ORAL EVERY 8 HOURS
Qty: 0 | Refills: 0 | Status: DISCONTINUED | OUTPATIENT
Start: 2017-11-20 | End: 2017-11-23

## 2017-11-20 RX ORDER — POLYETHYLENE GLYCOL 3350 17 G/17G
17 POWDER, FOR SOLUTION ORAL
Qty: 0 | Refills: 0 | Status: DISCONTINUED | OUTPATIENT
Start: 2017-11-20 | End: 2017-11-23

## 2017-11-20 RX ORDER — LIDOCAINE 4 G/100G
2 CREAM TOPICAL DAILY
Qty: 0 | Refills: 0 | Status: DISCONTINUED | OUTPATIENT
Start: 2017-11-20 | End: 2017-11-23

## 2017-11-20 RX ADMIN — CYCLOBENZAPRINE HYDROCHLORIDE 10 MILLIGRAM(S): 10 TABLET, FILM COATED ORAL at 05:58

## 2017-11-20 RX ADMIN — Medication 100 MILLIGRAM(S): at 05:58

## 2017-11-20 RX ADMIN — CELECOXIB 100 MILLIGRAM(S): 200 CAPSULE ORAL at 09:10

## 2017-11-20 RX ADMIN — MORPHINE SULFATE 15 MILLIGRAM(S): 50 CAPSULE, EXTENDED RELEASE ORAL at 13:20

## 2017-11-20 RX ADMIN — SODIUM CHLORIDE 75 MILLILITER(S): 9 INJECTION INTRAMUSCULAR; INTRAVENOUS; SUBCUTANEOUS at 16:16

## 2017-11-20 RX ADMIN — HEPARIN SODIUM 5000 UNIT(S): 5000 INJECTION INTRAVENOUS; SUBCUTANEOUS at 12:38

## 2017-11-20 RX ADMIN — CELECOXIB 100 MILLIGRAM(S): 200 CAPSULE ORAL at 10:00

## 2017-11-20 RX ADMIN — LIDOCAINE 2 PATCH: 4 CREAM TOPICAL at 12:43

## 2017-11-20 RX ADMIN — Medication 100 MILLIGRAM(S): at 12:38

## 2017-11-20 RX ADMIN — Medication 100 MILLIGRAM(S): at 21:40

## 2017-11-20 RX ADMIN — LACTULOSE 10 GRAM(S): 10 SOLUTION ORAL at 12:37

## 2017-11-20 RX ADMIN — MORPHINE SULFATE 15 MILLIGRAM(S): 50 CAPSULE, EXTENDED RELEASE ORAL at 12:36

## 2017-11-20 RX ADMIN — LACTULOSE 10 GRAM(S): 10 SOLUTION ORAL at 17:17

## 2017-11-20 RX ADMIN — Medication 25 MILLIGRAM(S): at 12:38

## 2017-11-20 RX ADMIN — CYCLOBENZAPRINE HYDROCHLORIDE 5 MILLIGRAM(S): 10 TABLET, FILM COATED ORAL at 16:16

## 2017-11-20 RX ADMIN — SENNA PLUS 1 TABLET(S): 8.6 TABLET ORAL at 21:40
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
## 2017-11-20 NOTE — PROGRESS NOTE ADULT - PROBLEM SELECTOR PLAN 3
Pt with chronic LBP with sciatica. Patient on lyrica, celecoxib, flexeril at home for pain control. ISTOP Reference #: [id number].  Plan as above

## 2017-11-20 NOTE — PROGRESS NOTE ADULT - PROBLEM SELECTOR PLAN 2
Hx of L5-S1 herniation s/p microdiscectomy, C5-C6 herniation. Follows Dr. Bowles. MRI c-spine showed herniation - plan for spinal fusion if symptoms persist.

## 2017-11-20 NOTE — PROGRESS NOTE ADULT - SUBJECTIVE AND OBJECTIVE BOX
Patient is a 37y old  Female who presents with a chief complaint of Back Pain (18 Nov 2017 10:41)      SUBJECTIVE / OVERNIGHT EVENTS:  Patient feels much improved with pain from yesterday, ROS otherwise negative.  Denies fecal/urinary incontinence, abdominal pain, saddle anesthesia, fever/chills.  T(C): 36.9 (11-20-17 @ 06:00), Max: 36.9 (11-20-17 @ 06:00)  HR: 83 (11-20-17 @ 09:14) (64 - 83)  BP: 116/79 (11-20-17 @ 09:14) (109/72 - 116/79)  RR: 18 (11-20-17 @ 06:00) (18 - 18)  SpO2: 98% (11-20-17 @ 06:00) (98% - 98%)    MEDICATIONS  (STANDING):  celecoxib 100 milliGRAM(s) Oral two times a day with meals  docusate sodium 100 milliGRAM(s) Oral three times a day  heparin  Injectable 5000 Unit(s) SubCutaneous every 8 hours  influenza   Vaccine 0.5 milliLiter(s) IntraMuscular once  lactulose Syrup 10 Gram(s) Oral every 6 hours  lidocaine   Patch 2 Patch Transdermal daily  polyethylene glycol 3350 17 Gram(s) Oral two times a day  pregabalin 25 milliGRAM(s) Oral daily  senna 1 Tablet(s) Oral daily  sodium chloride 0.9%. 1000 milliLiter(s) (75 mL/Hr) IV Continuous <Continuous>    MEDICATIONS  (PRN):  cyclobenzaprine 5 milliGRAM(s) Oral every 8 hours PRN Muscle Spasm  morphine  IR 15 milliGRAM(s) Oral every 4 hours PRN Moderate Pain (4 - 6)    PHYSICAL EXAM:  GENERAL: NAD, well-developed  HEAD:  Atraumatic, Normocephalic  EYES: EOMI, conjunctiva and sclera clear  NECK: Supple, No JVD  CHEST/LUNG: Clear to auscultation bilaterally; No wheeze  HEART: Regular rate and rhythm; No murmurs, rubs, or gallops  ABDOMEN: Soft, Nontender, Nondistended; Bowel sounds present  EXTREMITIES:  2+ Peripheral Pulses, No clubbing, cyanosis, or edema  BACK: tenderness to palpate in lower back  NEUROLOGY: aaox3; pt with poor balance, unsteady gaits  SKIN: No rashes or lesions    NO NEW LABS     RADIOLOGY & ADDITIONAL TESTS:    Imaging Personally Reviewed: MRI Lumbar spine show L5 impingement   Consultant(s) Notes Reviewed:  orthopedics    Care Discussed with Consultants/Other Providers:

## 2017-11-20 NOTE — PROGRESS NOTE ADULT - PROBLEM SELECTOR PLAN 1
Pt pw with acute back pain, radiating down to the LEs, associated with paresthesias and weakness of LEs. CT spines not remarkable for any acute etiology.   MRI Lumbar spine show nerve impingement L5. Start patient on Lyrica, lower Flexeril dose to 5 mg prn for pain.   Continue Morphine 15mg PO q4hr prn for moderate pain  - continue lidoderm patch in lumbar region  - continue celecoxib 100mg BID  Ortho follow up.   PT eval- Home with Home PT.

## 2017-11-21 LAB
24R-OH-CALCIDIOL SERPL-MCNC: 22.5 NG/ML — LOW (ref 30–80)
ANION GAP SERPL CALC-SCNC: 11 MMOL/L — SIGNIFICANT CHANGE UP (ref 5–17)
BUN SERPL-MCNC: 17 MG/DL — SIGNIFICANT CHANGE UP (ref 7–23)
CALCIUM SERPL-MCNC: 8.6 MG/DL — SIGNIFICANT CHANGE UP (ref 8.4–10.5)
CHLORIDE SERPL-SCNC: 105 MMOL/L — SIGNIFICANT CHANGE UP (ref 96–108)
CO2 SERPL-SCNC: 25 MMOL/L — SIGNIFICANT CHANGE UP (ref 22–31)
CREAT SERPL-MCNC: 1.11 MG/DL — SIGNIFICANT CHANGE UP (ref 0.5–1.3)
GLUCOSE SERPL-MCNC: 76 MG/DL — SIGNIFICANT CHANGE UP (ref 70–99)
POTASSIUM SERPL-MCNC: 4.2 MMOL/L — SIGNIFICANT CHANGE UP (ref 3.5–5.3)
POTASSIUM SERPL-SCNC: 4.2 MMOL/L — SIGNIFICANT CHANGE UP (ref 3.5–5.3)
SODIUM SERPL-SCNC: 141 MMOL/L — SIGNIFICANT CHANGE UP (ref 135–145)
TSH SERPL-MCNC: 3.42 UIU/ML — SIGNIFICANT CHANGE UP (ref 0.27–4.2)

## 2017-11-21 PROCEDURE — 99233 SBSQ HOSP IP/OBS HIGH 50: CPT

## 2017-11-21 RX ORDER — PHENYLEPHRINE-SHARK LIVER OIL-MINERAL OIL-PETROLATUM RECTAL OINTMENT
1 OINTMENT (GRAM) RECTAL EVERY 6 HOURS
Qty: 0 | Refills: 0 | Status: DISCONTINUED | OUTPATIENT
Start: 2017-11-21 | End: 2017-11-23

## 2017-11-21 RX ADMIN — MORPHINE SULFATE 15 MILLIGRAM(S): 50 CAPSULE, EXTENDED RELEASE ORAL at 06:03

## 2017-11-21 RX ADMIN — CYCLOBENZAPRINE HYDROCHLORIDE 5 MILLIGRAM(S): 10 TABLET, FILM COATED ORAL at 05:18

## 2017-11-21 RX ADMIN — CELECOXIB 100 MILLIGRAM(S): 200 CAPSULE ORAL at 08:44

## 2017-11-21 RX ADMIN — LIDOCAINE 2 PATCH: 4 CREAM TOPICAL at 23:10

## 2017-11-21 RX ADMIN — CELECOXIB 100 MILLIGRAM(S): 200 CAPSULE ORAL at 08:14

## 2017-11-21 RX ADMIN — LIDOCAINE 2 PATCH: 4 CREAM TOPICAL at 00:53

## 2017-11-21 RX ADMIN — Medication 25 MILLIGRAM(S): at 17:11

## 2017-11-21 RX ADMIN — CELECOXIB 100 MILLIGRAM(S): 200 CAPSULE ORAL at 17:11

## 2017-11-21 RX ADMIN — LIDOCAINE 2 PATCH: 4 CREAM TOPICAL at 11:26

## 2017-11-21 RX ADMIN — MORPHINE SULFATE 15 MILLIGRAM(S): 50 CAPSULE, EXTENDED RELEASE ORAL at 05:18

## 2017-11-21 RX ADMIN — POLYETHYLENE GLYCOL 3350 17 GRAM(S): 17 POWDER, FOR SOLUTION ORAL at 17:11

## 2017-11-21 RX ADMIN — POLYETHYLENE GLYCOL 3350 17 GRAM(S): 17 POWDER, FOR SOLUTION ORAL at 05:17

## 2017-11-21 RX ADMIN — Medication 100 MILLIGRAM(S): at 13:21

## 2017-11-21 RX ADMIN — Medication 100 MILLIGRAM(S): at 21:44

## 2017-11-21 RX ADMIN — Medication 100 MILLIGRAM(S): at 05:17

## 2017-11-21 RX ADMIN — SENNA PLUS 1 TABLET(S): 8.6 TABLET ORAL at 21:44
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
## 2017-11-21 NOTE — PROGRESS NOTE ADULT - PROBLEM SELECTOR PLAN 1
Pt pw with acute back pain, radiating down to the LEs, associated with paresthesias and weakness of LEs. CT spines not remarkable for any acute etiology.   MRI Lumbar spine show nerve impingement L5. Increase Lyrica dose BID, continue with Flexeril dose to 5 mg prn for pain.   Continue Morphine 15mg PO q4hr prn for moderate pain  - continue lidoderm patch in lumbar region  - continue celecoxib 100mg BID  Ortho follow up.   PT eval- Home with Home PT.

## 2017-11-21 NOTE — PROGRESS NOTE ADULT - SUBJECTIVE AND OBJECTIVE BOX
Patient is a 37y old  Female who presents with a chief complaint of Back Pain (18 Nov 2017 10:41)      SUBJECTIVE / OVERNIGHT EVENTS:  Patient feels much improved with pain from yesterday, ROS otherwise negative.  Denies fecal/urinary incontinence, abdominal pain, saddle anesthesia, fever/chills.  Noevnets overnight.   T(C): 36.4 (11-21-17 @ 13:32), Max: 36.4 (11-21-17 @ 13:32)  HR: 77 (11-21-17 @ 13:32) (77 - 77)  BP: 106/65 (11-21-17 @ 13:32) (106/65 - 106/65)  RR: 18 (11-21-17 @ 13:32) (18 - 18)  SpO2: 97% (11-21-17 @ 13:32) (97% - 97%)    MEDICATIONS  (STANDING):  celecoxib 100 milliGRAM(s) Oral two times a day with meals  docusate sodium 100 milliGRAM(s) Oral three times a day  heparin  Injectable 5000 Unit(s) SubCutaneous every 8 hours  influenza   Vaccine 0.5 milliLiter(s) IntraMuscular once  lidocaine   Patch 2 Patch Transdermal daily  polyethylene glycol 3350 17 Gram(s) Oral two times a day  pregabalin 25 milliGRAM(s) Oral two times a day  senna 1 Tablet(s) Oral daily  sodium chloride 0.9%. 1000 milliLiter(s) (75 mL/Hr) IV Continuous <Continuous>    MEDICATIONS  (PRN):  cyclobenzaprine 5 milliGRAM(s) Oral every 8 hours PRN Muscle Spasm  hemorrhoidal Ointment 1 Application(s) Rectal every 6 hours PRN pain or irritation  morphine  IR 15 milliGRAM(s) Oral every 4 hours PRN Moderate Pain (4 - 6)    PHYSICAL EXAM:  GENERAL: NAD, well-developed  HEAD:  Atraumatic, Normocephalic  EYES: EOMI, conjunctiva and sclera clear  NECK: Supple, No JVD  CHEST/LUNG: Clear to auscultation bilaterally; No wheeze  HEART: Regular rate and rhythm; No murmurs, rubs, or gallops  ABDOMEN: Soft, Nontender, Nondistended; Bowel sounds present  EXTREMITIES:  2+ Peripheral Pulses, No clubbing, cyanosis, or edema  BACK: tenderness to palpate in lower back  NEUROLOGY: aaox3; pt with poor balance, unsteady gaits  SKIN: No rashes or lesions                141|105|17<76  4.2|25|1.11  8.6,--,--  11-21 @ 07:37      RADIOLOGY & ADDITIONAL TESTS:    Imaging Personally Reviewed: MRI Lumbar spine show L5 impingement   Consultant(s) Notes Reviewed:  orthopedics    Care Discussed with Consultants/Other Providers:

## 2017-11-21 NOTE — PROGRESS NOTE ADULT - PROBLEM SELECTOR PLAN 3
Pt with chronic LBP with sciatica. Patient on lyrica, celecoxib, flexeril at home for pain control. ISTOP Reference #: [id number].  Plan as above

## 2017-11-22 DIAGNOSIS — M54.41 LUMBAGO WITH SCIATICA, RIGHT SIDE: ICD-10-CM

## 2017-11-22 DIAGNOSIS — R42 DIZZINESS AND GIDDINESS: ICD-10-CM

## 2017-11-22 LAB
ANION GAP SERPL CALC-SCNC: 8 MMOL/L — SIGNIFICANT CHANGE UP (ref 5–17)
BUN SERPL-MCNC: 24 MG/DL — HIGH (ref 7–23)
CALCIUM SERPL-MCNC: 7.6 MG/DL — LOW (ref 8.4–10.5)
CHLORIDE SERPL-SCNC: 100 MMOL/L — SIGNIFICANT CHANGE UP (ref 96–108)
CO2 SERPL-SCNC: 26 MMOL/L — SIGNIFICANT CHANGE UP (ref 22–31)
CREAT SERPL-MCNC: 1.06 MG/DL — SIGNIFICANT CHANGE UP (ref 0.5–1.3)
GLUCOSE BLDC GLUCOMTR-MCNC: 106 MG/DL — HIGH (ref 70–99)
GLUCOSE BLDC GLUCOMTR-MCNC: 82 MG/DL — SIGNIFICANT CHANGE UP (ref 70–99)
GLUCOSE SERPL-MCNC: 127 MG/DL — HIGH (ref 70–99)
HCT VFR BLD CALC: 36.2 % — SIGNIFICANT CHANGE UP (ref 34.5–45)
HGB BLD-MCNC: 12.6 G/DL — SIGNIFICANT CHANGE UP (ref 11.5–15.5)
MAGNESIUM SERPL-MCNC: 2.2 MG/DL — SIGNIFICANT CHANGE UP (ref 1.6–2.6)
MCHC RBC-ENTMCNC: 31.8 PG — SIGNIFICANT CHANGE UP (ref 27–34)
MCHC RBC-ENTMCNC: 34.8 GM/DL — SIGNIFICANT CHANGE UP (ref 32–36)
MCV RBC AUTO: 91.2 FL — SIGNIFICANT CHANGE UP (ref 80–100)
PLATELET # BLD AUTO: 258 K/UL — SIGNIFICANT CHANGE UP (ref 150–400)
POTASSIUM SERPL-MCNC: 4.2 MMOL/L — SIGNIFICANT CHANGE UP (ref 3.5–5.3)
POTASSIUM SERPL-MCNC: 7.9 MMOL/L — CRITICAL HIGH (ref 3.5–5.3)
POTASSIUM SERPL-SCNC: 4.2 MMOL/L — SIGNIFICANT CHANGE UP (ref 3.5–5.3)
POTASSIUM SERPL-SCNC: 7.9 MMOL/L — CRITICAL HIGH (ref 3.5–5.3)
RBC # BLD: 3.97 M/UL — SIGNIFICANT CHANGE UP (ref 3.8–5.2)
RBC # FLD: 11.9 % — SIGNIFICANT CHANGE UP (ref 10.3–14.5)
SODIUM SERPL-SCNC: 134 MMOL/L — LOW (ref 135–145)
WBC # BLD: 4.6 K/UL — SIGNIFICANT CHANGE UP (ref 3.8–10.5)
WBC # FLD AUTO: 4.6 K/UL — SIGNIFICANT CHANGE UP (ref 3.8–10.5)

## 2017-11-22 PROCEDURE — 70450 CT HEAD/BRAIN W/O DYE: CPT | Mod: 26

## 2017-11-22 PROCEDURE — 99233 SBSQ HOSP IP/OBS HIGH 50: CPT

## 2017-11-22 RX ORDER — ONDANSETRON 8 MG/1
4 TABLET, FILM COATED ORAL ONCE
Qty: 0 | Refills: 0 | Status: COMPLETED | OUTPATIENT
Start: 2017-11-22 | End: 2017-11-22

## 2017-11-22 RX ORDER — LACTULOSE 10 G/15ML
10 SOLUTION ORAL EVERY 6 HOURS
Qty: 0 | Refills: 0 | Status: COMPLETED | OUTPATIENT
Start: 2017-11-22 | End: 2017-11-22

## 2017-11-22 RX ORDER — OXYCODONE AND ACETAMINOPHEN 5; 325 MG/1; MG/1
1 TABLET ORAL EVERY 8 HOURS
Qty: 0 | Refills: 0 | Status: DISCONTINUED | OUTPATIENT
Start: 2017-11-22 | End: 2017-11-23

## 2017-11-22 RX ORDER — CHOLECALCIFEROL (VITAMIN D3) 125 MCG
2000 CAPSULE ORAL DAILY
Qty: 0 | Refills: 0 | Status: DISCONTINUED | OUTPATIENT
Start: 2017-11-22 | End: 2017-11-23

## 2017-11-22 RX ADMIN — Medication 2000 UNIT(S): at 12:45

## 2017-11-22 RX ADMIN — Medication 100 MILLIGRAM(S): at 14:10

## 2017-11-22 RX ADMIN — Medication 25 MILLIGRAM(S): at 06:35

## 2017-11-22 RX ADMIN — LIDOCAINE 2 PATCH: 4 CREAM TOPICAL at 12:43

## 2017-11-22 RX ADMIN — SENNA PLUS 1 TABLET(S): 8.6 TABLET ORAL at 23:03

## 2017-11-22 RX ADMIN — Medication 100 MILLIGRAM(S): at 23:03

## 2017-11-22 RX ADMIN — LACTULOSE 10 GRAM(S): 10 SOLUTION ORAL at 14:08

## 2017-11-22 RX ADMIN — LACTULOSE 10 GRAM(S): 10 SOLUTION ORAL at 19:41

## 2017-11-22 RX ADMIN — ONDANSETRON 4 MILLIGRAM(S): 8 TABLET, FILM COATED ORAL at 09:23

## 2017-11-22 RX ADMIN — HEPARIN SODIUM 5000 UNIT(S): 5000 INJECTION INTRAVENOUS; SUBCUTANEOUS at 06:35

## 2017-11-22 RX ADMIN — MORPHINE SULFATE 15 MILLIGRAM(S): 50 CAPSULE, EXTENDED RELEASE ORAL at 02:00

## 2017-11-22 RX ADMIN — POLYETHYLENE GLYCOL 3350 17 GRAM(S): 17 POWDER, FOR SOLUTION ORAL at 17:20

## 2017-11-22 RX ADMIN — Medication 100 MILLIGRAM(S): at 06:35

## 2017-11-22 NOTE — PROVIDER CONTACT NOTE (OTHER) - SITUATION
Pt complaining of hemorrhoid pain due to constipation & would like a suppository.
Pt is a 36 yo F adx for chronic lower back pain. Pt is c/o dizziness.
Pt refusing IV lock; was getting NS @ 75mL/hour
Pt requesting IV Fluids due to MRI with IV contrast today.
pt complains of dizziness
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
## 2017-11-22 NOTE — PROVIDER CONTACT NOTE (OTHER) - ASSESSMENT
pt A&O x4. resting in bed. VS as per flow sheet
Pt AxOx4; VSS; states last bowel movement was yesterday morning; abdomen not distended & is non-tender.
Pt AxOx4; VSS; stating that she is leaving tomorrow; drinking orally; IV pain medications switched over to PO.
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
Pt is A&Ox4. Pt is c/o dizziness.

## 2017-11-22 NOTE — PROVIDER CONTACT NOTE (OTHER) - REASON
Pt c/o dizziness
Pt complaining of hemorrhoid pain due to constipation & would like a suppository.
Pt refusing IV lock; was getting NS @ 75mL/hour
Pt requesting IV Fluids due to MRI with IV contrast today.
pt complains of dizziness
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
## 2017-11-22 NOTE — PROGRESS NOTE ADULT - SUBJECTIVE AND OBJECTIVE BOX
Patient is a 37y old  Female who presents with a chief complaint of Back Pain (18 Nov 2017 10:41)      SUBJECTIVE / OVERNIGHT EVENTS:  Patient feels much improved with pain from yesterday, ROS otherwise negative.  Denies fecal/urinary incontinence, abdominal pain, saddle anesthesia, fever/chills.  Noevnets overnight.      T(C): 36.6 (11-22-17 @ 05:52), Max: 36.6 (11-22-17 @ 05:52)  HR: 74 (11-22-17 @ 05:52) (74 - 74)  BP: 115/79 (11-22-17 @ 05:52) (115/79 - 115/79)  RR: 18 (11-22-17 @ 05:52) (18 - 18)  SpO2: 98% (11-22-17 @ 05:52) (98% - 98%)                          PHYSICAL EXAM:  GENERAL: NAD, well-developed  HEAD:  Atraumatic, Normocephalic  EYES: EOMI, conjunctiva and sclera clear  NECK: Supple, No JVD  CHEST/LUNG: Clear to auscultation bilaterally; No wheeze  HEART: Regular rate and rhythm; No murmurs, rubs, or gallops  ABDOMEN: Soft, Nontender, Nondistended; Bowel sounds present  EXTREMITIES:  2+ Peripheral Pulses, No clubbing, cyanosis, or edema  BACK: tenderness to palpate in lower back  NEUROLOGY: aaox3; pt with poor balance, unsteady gaits  SKIN: No rashes or lesions                141|105|17<76  4.2|25|1.11  8.6,--,--  11-21 @ 07:37      RADIOLOGY & ADDITIONAL TESTS:    Imaging Personally Reviewed: MRI Lumbar spine show L5 impingement   Consultant(s) Notes Reviewed:  orthopedics    Care Discussed with Consultants/Other Providers: Patient is a 37y old  Female who presents with a chief complaint of Back Pain (18 Nov 2017 10:41)      SUBJECTIVE / OVERNIGHT EVENTS:  Patient complaining of dizziness since last night, no vertigo, associated with some nausea. No vomiting. Patient feels much improved with pain since yesterday, ROS otherwise negative.  Denies fecal/urinary incontinence, abdominal pain, saddle anesthesia, fever/chills.  No events overnight.      T(C): 36.6 (11-22-17 @ 05:52), Max: 36.6 (11-22-17 @ 05:52)  HR: 74 (11-22-17 @ 05:52) (74 - 74)  BP: 115/79 (11-22-17 @ 05:52) (115/79 - 115/79)  RR: 18 (11-22-17 @ 05:52) (18 - 18)  SpO2: 98% (11-22-17 @ 05:52) (98% - 98%)    MEDICATIONS  (STANDING):  celecoxib 100 milliGRAM(s) Oral two times a day with meals  docusate sodium 100 milliGRAM(s) Oral three times a day  heparin  Injectable 5000 Unit(s) SubCutaneous every 8 hours  influenza   Vaccine 0.5 milliLiter(s) IntraMuscular once  lidocaine   Patch 2 Patch Transdermal daily  polyethylene glycol 3350 17 Gram(s) Oral two times a day  pregabalin 25 milliGRAM(s) Oral two times a day  senna 1 Tablet(s) Oral daily  sodium chloride 0.9%. 1000 milliLiter(s) (75 mL/Hr) IV Continuous <Continuous>    MEDICATIONS  (PRN):  cyclobenzaprine 5 milliGRAM(s) Oral every 8 hours PRN Muscle Spasm  hemorrhoidal Ointment 1 Application(s) Rectal every 6 hours PRN pain or irritation  oxyCODONE    5 mG/acetaminophen 325 mG 1 Tablet(s) Oral every 8 hours PRN Moderate Pain (4 - 6)    PHYSICAL EXAM:  GENERAL: NAD, well-developed  HEAD:  Atraumatic, Normocephalic  EYES: EOMI, conjunctiva and sclera clear  NECK: Supple, No JVD  CHEST/LUNG: Clear to auscultation bilaterally; No wheeze  HEART: Regular rate and rhythm; No murmurs, rubs, or gallops  ABDOMEN: Soft, Nontender, Nondistended; Bowel sounds present  EXTREMITIES:  2+ Peripheral Pulses, No clubbing, cyanosis, or edema  BACK: tenderness to palpate in lower back  NEUROLOGY: aaox3; pt with poor balance, unsteady gaits  SKIN: No rashes or lesions                            12.6   4.6   )-----------( 258      ( 22 Nov 2017 01:59 )             36.2               --|--|--<--  4.2|--|--  --,--,--  11-22 @ 02:56  134|100|24<127  7.9|26|1.06  7.6,2.2,--  11-22 @ 01:59            141|105|17<76  4.2|25|1.11  8.6,--,--  11-21 @ 07:37      RADIOLOGY & ADDITIONAL TESTS:    Imaging Personally Reviewed: MRI Lumbar spine show L5 impingement   Consultant(s) Notes Reviewed:  orthopedics    Care Discussed with Consultants/Other Providers:
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
## 2017-11-22 NOTE — PROGRESS NOTE ADULT - PROBLEM SELECTOR PLAN 2
Hx of L5-S1 herniation s/p microdiscectomy, C5-C6 herniation. Follows Dr. Bowles. MRI c-spine showed herniation - plan for spinal fusion if symptoms persist. Pt pw with acute back pain, radiating down to the LEs, associated with paresthesias and weakness of LEs. CT spines not remarkable for any acute etiology.   MRI Lumbar spine show nerve impingement L5. Increase Lyrica dose BID, continue with Flexeril dose to 5 mg prn for pain.   D/C Morphine IR and start patient on Oxycodone 5 mg po q8 prn.  Pain management called. Follow up recs.   Continue lidoderm patch in lumbar region  - continue celecoxib 100mg BID    PT eval- Home with Home PT.

## 2017-11-22 NOTE — PROGRESS NOTE ADULT - PROBLEM SELECTOR PLAN 5
DVT ppx: HSQ pain control as above  - physical therapy eval appreciated, recommend Home with Home PT.

## 2017-11-22 NOTE — CONSULT NOTE ADULT - PROBLEM SELECTOR RECOMMENDATION 9
- Increase Percocet 5/325mg po q6hr prn pain  - Continue Lyrica 25mg po bid for neuropathic pain component  - Flexeril 5mg po tid as needed for spasm  Bowel regimen as per primary team  istop / verified
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
## 2017-11-22 NOTE — PROGRESS NOTE ADULT - PROBLEM SELECTOR PLAN 1
Pt pw with acute back pain, radiating down to the LEs, associated with paresthesias and weakness of LEs. CT spines not remarkable for any acute etiology.   MRI Lumbar spine show nerve impingement L5. Increase Lyrica dose BID, continue with Flexeril dose to 5 mg prn for pain.   Continue Morphine 15mg PO q4hr prn for moderate pain  - continue lidoderm patch in lumbar region  - continue celecoxib 100mg BID  Ortho follow up.   PT eval- Home with Home PT. Patient reports dizziness, orthostatics negative. CT head negative for CVA/ Bleed.   likely from medications, d/c Morphine, start patient on Oxycodone for pain.

## 2017-11-22 NOTE — PROVIDER CONTACT NOTE (OTHER) - RECOMMENDATIONS
NP Abigail to assess pt
Can you please order a suppository for the pt?
Take a ankita BP (106/50). Contact provider for further assessment and POC.

## 2017-11-22 NOTE — PROGRESS NOTE ADULT - PROBLEM SELECTOR PLAN 4
pain control as above  - physical therapy eval appreciated, recommend Home with Home PT. Pt with chronic LBP with sciatica. Patient on lyrica, celecoxib, flexeril at home for pain control. ISTOP Reference #: [id number].  Plan as above

## 2017-11-22 NOTE — PROVIDER CONTACT NOTE (OTHER) - ACTION/TREATMENT ORDERED:
NP Abigail will assess pt
NP Ema Gates notified; stated that she will order normal saline at 75mL/hour for 10 hours.
TERI Boo notified; stated that it is fine that the pt does not want a IV lock & will not receive the fluids anymore.
NP Elvira Finnegan notified; stated that she will order a suppository.
NP stated to get BG, a set of orthostatic VS, and STAT blood work. Will continue to monitor
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
## 2017-11-22 NOTE — PROGRESS NOTE ADULT - PROBLEM SELECTOR PLAN 3
Pt with chronic LBP with sciatica. Patient on lyrica, celecoxib, flexeril at home for pain control. ISTOP Reference #: [id number].  Plan as above Hx of L5-S1 herniation s/p microdiscectomy, C5-C6 herniation. Follows Dr. Bowles. MRI c-spine showed herniation - plan for spinal fusion if symptoms persist.

## 2017-11-23 VITALS
HEART RATE: 79 BPM | SYSTOLIC BLOOD PRESSURE: 115 MMHG | DIASTOLIC BLOOD PRESSURE: 63 MMHG | TEMPERATURE: 98 F | OXYGEN SATURATION: 98 % | RESPIRATION RATE: 18 BRPM

## 2017-11-23 LAB
ANION GAP SERPL CALC-SCNC: 12 MMOL/L — SIGNIFICANT CHANGE UP (ref 5–17)
BUN SERPL-MCNC: 13 MG/DL — SIGNIFICANT CHANGE UP (ref 7–23)
CALCIUM SERPL-MCNC: 9.3 MG/DL — SIGNIFICANT CHANGE UP (ref 8.4–10.5)
CHLORIDE SERPL-SCNC: 101 MMOL/L — SIGNIFICANT CHANGE UP (ref 96–108)
CO2 SERPL-SCNC: 27 MMOL/L — SIGNIFICANT CHANGE UP (ref 22–31)
CREAT SERPL-MCNC: 1.05 MG/DL — SIGNIFICANT CHANGE UP (ref 0.5–1.3)
GLUCOSE SERPL-MCNC: 80 MG/DL — SIGNIFICANT CHANGE UP (ref 70–99)
POTASSIUM SERPL-MCNC: 4.4 MMOL/L — SIGNIFICANT CHANGE UP (ref 3.5–5.3)
POTASSIUM SERPL-SCNC: 4.4 MMOL/L — SIGNIFICANT CHANGE UP (ref 3.5–5.3)
SODIUM SERPL-SCNC: 140 MMOL/L — SIGNIFICANT CHANGE UP (ref 135–145)

## 2017-11-23 PROCEDURE — 72125 CT NECK SPINE W/O DYE: CPT

## 2017-11-23 PROCEDURE — 72131 CT LUMBAR SPINE W/O DYE: CPT

## 2017-11-23 PROCEDURE — 70450 CT HEAD/BRAIN W/O DYE: CPT

## 2017-11-23 PROCEDURE — 96376 TX/PRO/DX INJ SAME DRUG ADON: CPT

## 2017-11-23 PROCEDURE — 80053 COMPREHEN METABOLIC PANEL: CPT

## 2017-11-23 PROCEDURE — 99239 HOSP IP/OBS DSCHRG MGMT >30: CPT

## 2017-11-23 PROCEDURE — 80048 BASIC METABOLIC PNL TOTAL CA: CPT

## 2017-11-23 PROCEDURE — 96374 THER/PROPH/DIAG INJ IV PUSH: CPT

## 2017-11-23 PROCEDURE — 84132 ASSAY OF SERUM POTASSIUM: CPT

## 2017-11-23 PROCEDURE — 72158 MRI LUMBAR SPINE W/O & W/DYE: CPT

## 2017-11-23 PROCEDURE — 84443 ASSAY THYROID STIM HORMONE: CPT

## 2017-11-23 PROCEDURE — 72128 CT CHEST SPINE W/O DYE: CPT

## 2017-11-23 PROCEDURE — 85027 COMPLETE CBC AUTOMATED: CPT

## 2017-11-23 PROCEDURE — 97116 GAIT TRAINING THERAPY: CPT

## 2017-11-23 PROCEDURE — 99285 EMERGENCY DEPT VISIT HI MDM: CPT | Mod: 25

## 2017-11-23 PROCEDURE — 97161 PT EVAL LOW COMPLEX 20 MIN: CPT

## 2017-11-23 PROCEDURE — 97530 THERAPEUTIC ACTIVITIES: CPT

## 2017-11-23 PROCEDURE — 93005 ELECTROCARDIOGRAM TRACING: CPT

## 2017-11-23 PROCEDURE — 82962 GLUCOSE BLOOD TEST: CPT

## 2017-11-23 PROCEDURE — 96375 TX/PRO/DX INJ NEW DRUG ADDON: CPT

## 2017-11-23 PROCEDURE — 82306 VITAMIN D 25 HYDROXY: CPT

## 2017-11-23 PROCEDURE — 83735 ASSAY OF MAGNESIUM: CPT

## 2017-11-23 RX ORDER — CYCLOBENZAPRINE HYDROCHLORIDE 10 MG/1
1 TABLET, FILM COATED ORAL
Qty: 0 | Refills: 0 | COMMUNITY

## 2017-11-23 RX ORDER — CELECOXIB 200 MG/1
1 CAPSULE ORAL
Qty: 0 | Refills: 0 | COMMUNITY

## 2017-11-23 RX ORDER — SENNA PLUS 8.6 MG/1
1 TABLET ORAL
Qty: 7 | Refills: 0 | OUTPATIENT
Start: 2017-11-23 | End: 2017-11-30

## 2017-11-23 RX ORDER — PHENYLEPHRINE-SHARK LIVER OIL-MINERAL OIL-PETROLATUM RECTAL OINTMENT
1 OINTMENT (GRAM) RECTAL
Qty: 1 | Refills: 0 | OUTPATIENT
Start: 2017-11-23 | End: 2017-11-30

## 2017-11-23 RX ORDER — DOCUSATE SODIUM 100 MG
1 CAPSULE ORAL
Qty: 60 | Refills: 0 | OUTPATIENT
Start: 2017-11-23 | End: 2017-12-23

## 2017-11-23 RX ORDER — POLYETHYLENE GLYCOL 3350 17 G/17G
17 POWDER, FOR SOLUTION ORAL
Qty: 1 | Refills: 0 | OUTPATIENT
Start: 2017-11-23 | End: 2017-11-30

## 2017-11-23 RX ORDER — CHOLECALCIFEROL (VITAMIN D3) 125 MCG
2000 CAPSULE ORAL
Qty: 60000 | Refills: 0 | OUTPATIENT
Start: 2017-11-23 | End: 2017-12-23

## 2017-11-23 RX ORDER — CYCLOBENZAPRINE HYDROCHLORIDE 10 MG/1
1 TABLET, FILM COATED ORAL
Qty: 42 | Refills: 0 | OUTPATIENT
Start: 2017-11-23 | End: 2017-12-07

## 2017-11-23 RX ADMIN — Medication 2000 UNIT(S): at 12:26

## 2017-11-23 RX ADMIN — LIDOCAINE 2 PATCH: 4 CREAM TOPICAL at 12:27

## 2017-11-23 RX ADMIN — Medication 100 MILLIGRAM(S): at 12:26

## 2017-11-23 RX ADMIN — CELECOXIB 100 MILLIGRAM(S): 200 CAPSULE ORAL at 09:03

## 2017-11-23 RX ADMIN — Medication 100 MILLIGRAM(S): at 05:21

## 2017-11-23 RX ADMIN — POLYETHYLENE GLYCOL 3350 17 GRAM(S): 17 POWDER, FOR SOLUTION ORAL at 05:21

## 2017-11-23 RX ADMIN — LIDOCAINE 2 PATCH: 4 CREAM TOPICAL at 01:39

## 2017-11-23 NOTE — PROGRESS NOTE ADULT - SUBJECTIVE AND OBJECTIVE BOX
Patient is a 37y old  Female who presents with a chief complaint of Back Pain (18 Nov 2017 10:41)      SUBJECTIVE / OVERNIGHT EVENTS:  Patient feels much improved with pain since yesterday, ROS otherwise negative.  Denies fecal/urinary incontinence, abdominal pain, saddle anesthesia, fever/chills.  No events overnight.    T(C): 36.7 (11-23-17 @ 14:42), Max: 36.7 (11-23-17 @ 14:42)  HR: 79 (11-23-17 @ 14:42) (79 - 83)  BP: 115/63 (11-23-17 @ 14:42) (112/75 - 115/63)  RR: 18 (11-23-17 @ 14:42) (18 - 18)  SpO2: 98% (11-23-17 @ 14:42) (96% - 98%)    MEDICATIONS  (STANDING):  celecoxib 100 milliGRAM(s) Oral two times a day with meals  cholecalciferol 2000 Unit(s) Oral daily  docusate sodium 100 milliGRAM(s) Oral three times a day  heparin  Injectable 5000 Unit(s) SubCutaneous every 8 hours  influenza   Vaccine 0.5 milliLiter(s) IntraMuscular once  lidocaine   Patch 2 Patch Transdermal daily  polyethylene glycol 3350 17 Gram(s) Oral two times a day  pregabalin 25 milliGRAM(s) Oral two times a day  senna 1 Tablet(s) Oral daily  sodium chloride 0.9%. 1000 milliLiter(s) (75 mL/Hr) IV Continuous <Continuous>    MEDICATIONS  (PRN):  cyclobenzaprine 5 milliGRAM(s) Oral every 8 hours PRN Muscle Spasm  hemorrhoidal Ointment 1 Application(s) Rectal every 6 hours PRN pain or irritation  oxyCODONE    5 mG/acetaminophen 325 mG 1 Tablet(s) Oral every 8 hours PRN Moderate Pain (4 - 6)      PHYSICAL EXAM:  GENERAL: NAD, well-developed  HEAD:  Atraumatic, Normocephalic  EYES: EOMI, conjunctiva and sclera clear  NECK: Supple, No JVD  CHEST/LUNG: Clear to auscultation bilaterally; No wheeze  HEART: Regular rate and rhythm; No murmurs, rubs, or gallops  ABDOMEN: Soft, Nontender, Nondistended; Bowel sounds present  EXTREMITIES:  2+ Peripheral Pulses, No clubbing, cyanosis, or edema  NEUROLOGY: aaox3; pt with poor balance, unsteady gaits  SKIN: No rashes or lesions                          12.6   4.6   )-----------( 258      ( 22 Nov 2017 01:59 )             36.2               140|101|13<80  4.4|27|1.05  9.3,--,--  11-23 @ 08:47    RADIOLOGY & ADDITIONAL TESTS:    Imaging Personally Reviewed: MRI Lumbar spine show L5 impingement   Consultant(s) Notes Reviewed:  Pain Management   Care Discussed with Consultants/Other Providers:

## 2017-11-23 NOTE — PROGRESS NOTE ADULT - PROBLEM SELECTOR PLAN 6
DVT ppx: HSQ
Pending PT eval for LE weakness. If pt can ambulate without unsteadiness or imbalance, can be dc'ed home today
Pending PT eval for LE weakness. Pt with unsteady gait, imbalance with excruciating pain with ambulation. Not stable enough to be discharged home today. Will obtain MRI lumbar spine for etiology and call orthopedics for further inputs
Pending PT eval for LE weakness. Pt with unsteady gait, imbalance with excruciating pain with ambulation. Not stable enough to be discharged home today. Will obtain MRI lumbar spine for etiology and call orthopedics for further inputs
DVT ppx: HSQ

## 2017-11-23 NOTE — PROGRESS NOTE ADULT - PROBLEM SELECTOR PLAN 1
Resolved. CT head negative for CVA/ Bleed.   likely medications induced- Morphine, continue with Percocet as per pain management.

## 2017-11-23 NOTE — PROGRESS NOTE ADULT - ATTENDING COMMENTS
Agree with above.   -No further cardiac workup needed at this time    Valentín Polk MD  [facility]  [address], [address]  [place] [address]  office: [phone]  pager: [phone]
Agree with above. Normal EKG, echo, and stress echo. No further cardiac workup needed. F/U with Chris after discharge as schedule
Patient seen and examined, agree with above assessment and plan as transcribed above.    - No need for further cardiac testing    Ludwig Luther MD, [facility], [facility]  [address] [place] [address]  PHONE:  [phone]  BEEPER : [phone]
Patient seen and examined, agree with above assessment and plan as transcribed above.    - Pain control per primary team   - No need for further cardiac testing    Ludwig Luther MD, FACC  [facility], [facility]  [address] [place] [address]  PHONE:  [phone]  BEEPER : [phone]
Patient seen and examined.  Agree with above.   -f/u ortho    Valentín Polk MD  [facility]  [address], [address]  [place] [address]  office: [phone]  pager: [phone]
Agree with above. Normal EKG, echo, and stress echo. No further cardiac workup needed. F/U with Lyandres after discharge as scheduled.

## 2017-11-23 NOTE — PROGRESS NOTE ADULT - PROBLEM SELECTOR PROBLEM 6
Prophylactic measure
Discharge planning issues
Prophylactic measure
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
## 2017-11-23 NOTE — PROGRESS NOTE ADULT - PROBLEM SELECTOR PLAN 4
Pt with chronic LBP with sciatica. Patient on lyrica, celecoxib, flexeril at home for pain control. ISTOP Reference #: [id number].  Plan as above

## 2017-11-23 NOTE — PROGRESS NOTE ADULT - PROBLEM SELECTOR PLAN 2
Pt pw with acute back pain, radiating down to the LEs, associated with paresthesias and weakness of LEs. CT spines not remarkable for any acute etiology.   MRI Lumbar spine show nerve impingement L5. Continue with Flexeril 5 mg prn for pain and Lyrica 25 mg po BID.   Pain management consult appreciated. Continue with Celecoxib 100mg BID  PT eval- Home with Home PT.

## 2017-11-23 NOTE — PROGRESS NOTE ADULT - PROVIDER SPECIALTY LIST ADULT
Cardiology
Hospitalist
Cardiology
Hospitalist
Hospitalist

## 2017-12-04 ENCOUNTER — APPOINTMENT (OUTPATIENT)
Dept: ORTHOPEDIC SURGERY | Facility: CLINIC | Age: 37
End: 2017-12-04
Payer: COMMERCIAL

## 2017-12-04 ENCOUNTER — APPOINTMENT (OUTPATIENT)
Dept: SURGERY | Facility: CLINIC | Age: 37
End: 2017-12-04
Payer: COMMERCIAL

## 2017-12-04 VITALS
SYSTOLIC BLOOD PRESSURE: 111 MMHG | WEIGHT: 142 LBS | DIASTOLIC BLOOD PRESSURE: 77 MMHG | HEIGHT: 63 IN | HEART RATE: 76 BPM | BODY MASS INDEX: 25.16 KG/M2

## 2017-12-04 VITALS
DIASTOLIC BLOOD PRESSURE: 85 MMHG | TEMPERATURE: 98 F | HEIGHT: 64 IN | HEART RATE: 74 BPM | RESPIRATION RATE: 16 BRPM | SYSTOLIC BLOOD PRESSURE: 119 MMHG | OXYGEN SATURATION: 99 % | WEIGHT: 142 LBS | BODY MASS INDEX: 24.24 KG/M2

## 2017-12-04 DIAGNOSIS — Z83.3 FAMILY HISTORY OF DIABETES MELLITUS: ICD-10-CM

## 2017-12-04 DIAGNOSIS — Z78.9 OTHER SPECIFIED HEALTH STATUS: ICD-10-CM

## 2017-12-04 DIAGNOSIS — K60.0 ACUTE ANAL FISSURE: ICD-10-CM

## 2017-12-04 DIAGNOSIS — Z82.49 FAMILY HISTORY OF ISCHEMIC HEART DISEASE AND OTHER DISEASES OF THE CIRCULATORY SYSTEM: ICD-10-CM

## 2017-12-04 PROCEDURE — 99244 OFF/OP CNSLTJ NEW/EST MOD 40: CPT

## 2017-12-04 PROCEDURE — 99214 OFFICE O/P EST MOD 30 MIN: CPT

## 2017-12-04 RX ORDER — OXYCODONE AND ACETAMINOPHEN 5; 325 MG/1; MG/1
5-325 TABLET ORAL
Refills: 0 | Status: ACTIVE | COMMUNITY

## 2017-12-04 RX ORDER — PREGABALIN 25 MG/1
25 CAPSULE ORAL
Refills: 0 | Status: ACTIVE | COMMUNITY

## 2017-12-04 RX ORDER — HYDROCORTISONE 2.5% 25 MG/G
2.5 CREAM TOPICAL
Refills: 0 | Status: ACTIVE | COMMUNITY

## 2017-12-04 RX ORDER — SENNOSIDES 8.6 MG TABLETS 8.6 MG/1
TABLET ORAL
Refills: 0 | Status: ACTIVE | COMMUNITY

## 2017-12-04 RX ORDER — ADHESIVE TAPE 3"X 2.3 YD
50 MCG TAPE, NON-MEDICATED TOPICAL
Refills: 0 | Status: ACTIVE | COMMUNITY

## 2017-12-04 RX ORDER — LORATADINE 5 MG
17 TABLET,CHEWABLE ORAL
Refills: 0 | Status: ACTIVE | COMMUNITY

## 2017-12-04 RX ORDER — DULOXETINE HYDROCHLORIDE 30 MG/1
30 CAPSULE, DELAYED RELEASE PELLETS ORAL
Qty: 30 | Refills: 0 | Status: DISCONTINUED | COMMUNITY
Start: 2017-05-22 | End: 2017-12-04

## 2017-12-04 RX ORDER — DOCUSATE SODIUM 100 MG/1
CAPSULE ORAL
Refills: 0 | Status: ACTIVE | COMMUNITY

## 2017-12-04 RX ORDER — OXYCODONE HYDROCHLORIDE 5 MG/1
5 CAPSULE ORAL
Qty: 16 | Refills: 0 | Status: DISCONTINUED | COMMUNITY
Start: 2017-05-11 | End: 2017-12-04

## 2017-12-05 RX ORDER — NITROGLYCERIN 20 MG/G
2 OINTMENT TOPICAL
Qty: 30 | Refills: 2 | Status: ACTIVE | COMMUNITY
Start: 2017-12-04 | End: 1900-01-01

## 2017-12-11 ENCOUNTER — APPOINTMENT (OUTPATIENT)
Dept: ORTHOPEDIC SURGERY | Facility: CLINIC | Age: 37
End: 2017-12-11
Payer: OTHER MISCELLANEOUS

## 2017-12-11 VITALS — SYSTOLIC BLOOD PRESSURE: 115 MMHG | DIASTOLIC BLOOD PRESSURE: 78 MMHG | HEIGHT: 64 IN | HEART RATE: 80 BPM

## 2017-12-11 PROCEDURE — 99214 OFFICE O/P EST MOD 30 MIN: CPT

## 2017-12-13 ENCOUNTER — APPOINTMENT (OUTPATIENT)
Dept: ORTHOPEDIC SURGERY | Facility: CLINIC | Age: 37
End: 2017-12-13
Payer: OTHER MISCELLANEOUS

## 2017-12-13 VITALS
BODY MASS INDEX: 24.24 KG/M2 | HEART RATE: 70 BPM | HEIGHT: 64 IN | SYSTOLIC BLOOD PRESSURE: 114 MMHG | DIASTOLIC BLOOD PRESSURE: 78 MMHG | WEIGHT: 142 LBS

## 2017-12-13 DIAGNOSIS — M54.12 RADICULOPATHY, CERVICAL REGION: ICD-10-CM

## 2017-12-13 PROCEDURE — 99214 OFFICE O/P EST MOD 30 MIN: CPT

## 2018-01-08 ENCOUNTER — APPOINTMENT (OUTPATIENT)
Dept: SURGERY | Facility: HOSPITAL | Age: 38
End: 2018-01-08

## 2018-01-11 ENCOUNTER — OUTPATIENT (OUTPATIENT)
Dept: OUTPATIENT SERVICES | Facility: HOSPITAL | Age: 38
LOS: 1 days | End: 2018-01-11
Payer: COMMERCIAL

## 2018-01-11 VITALS
OXYGEN SATURATION: 99 % | WEIGHT: 145.06 LBS | SYSTOLIC BLOOD PRESSURE: 115 MMHG | TEMPERATURE: 98 F | HEART RATE: 90 BPM | DIASTOLIC BLOOD PRESSURE: 78 MMHG | RESPIRATION RATE: 15 BRPM | HEIGHT: 64 IN

## 2018-01-11 DIAGNOSIS — Z01.818 ENCOUNTER FOR OTHER PREPROCEDURAL EXAMINATION: ICD-10-CM

## 2018-01-11 DIAGNOSIS — K60.0 ACUTE ANAL FISSURE: ICD-10-CM

## 2018-01-11 DIAGNOSIS — R07.9 CHEST PAIN, UNSPECIFIED: ICD-10-CM

## 2018-01-11 DIAGNOSIS — Z98.89 OTHER SPECIFIED POSTPROCEDURAL STATES: Chronic | ICD-10-CM

## 2018-01-11 DIAGNOSIS — Z98.890 OTHER SPECIFIED POSTPROCEDURAL STATES: Chronic | ICD-10-CM

## 2018-01-11 PROCEDURE — 93005 ELECTROCARDIOGRAM TRACING: CPT

## 2018-01-11 PROCEDURE — G0463: CPT

## 2018-01-11 PROCEDURE — 93010 ELECTROCARDIOGRAM REPORT: CPT

## 2018-01-11 PROCEDURE — 80048 BASIC METABOLIC PNL TOTAL CA: CPT

## 2018-01-11 NOTE — H&P PST ADULT - PMH
Carpal Tunnel Syndrome  B/L  Chronic back pain    Herniated Cervical Disc    MVP (Mitral Valve Prolapse)    Umbilical Hernia Acute anal fissure    Carpal Tunnel Syndrome  B/L  Chronic back pain    Herniated Cervical Disc    MVP (Mitral Valve Prolapse)    Umbilical Hernia Acute anal fissure    Carpal Tunnel Syndrome  B/L  Chest pain  chronic c/o chest pain, followed by Dr. Perez. Recent cardiac workup 2017 negative.  Chronic back pain    Herniated Cervical Disc    MVP (Mitral Valve Prolapse)    Umbilical Hernia

## 2018-01-11 NOTE — H&P PST ADULT - RS GEN PE MLT RESP DETAILS PC
respirations non-labored/breath sounds equal respirations non-labored/no wheezes/no rales/breath sounds equal/no rhonchi/clear to auscultation bilaterally

## 2018-01-11 NOTE — H&P PST ADULT - PROBLEM SELECTOR PLAN 2
Pt with chronic chest pain, evaluated by Dr. Perez in 2017. Pt sent for stress and echo, which was negative as per patient. However, her chest pain has continued and the patient did not follow-up with Dr. Perez several months ago as requested.   Patient was instructed to have a pre-op cardiac eval and will see Dr. Perez on 1/12/18.

## 2018-01-11 NOTE — H&P PST ADULT - PAIN DESCRIPTION (FREQUENCY/QUALITY), PROFILE
neck pain sharp  both hands and wrists  numbness tingling and weakness/intermittent frequent/aching/sharp

## 2018-01-11 NOTE — H&P PST ADULT - PROBLEM SELECTOR PLAN 1
Scheduled rectal examination under anesthesia, botox injection  Urine pregnancy on DOS Scheduled rectal examination under anesthesia, with botox injection  Urine pregnancy on DOS  Pre-op celebrex, Tylenol ordered on DOS

## 2018-01-11 NOTE — H&P PST ADULT - ACTIVITY
exercise 2-3 x/ week x 2 hrs exercise 2-3 x/ week x 2 hrs, walks with cane, limited activity due to pain light exercise 2-3 x/ week, walks with cane, limited activity due to neck and back pain
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
## 2018-01-11 NOTE — H&P PST ADULT - PSH
History of laminectomy    History of umbilical hernia repair    Status Post Tonsillectomy History of laminectomy  2013  History of tonsillectomy and adenoidectomy    History of umbilical hernia repair

## 2018-01-11 NOTE — H&P PST ADULT - ABILITY TO HEAR (WITH HEARING AID OR HEARING APPLIANCE IF NORMALLY USED):
mild hearing loss both ears/Adequate: hears normal conversation without difficulty Adequate: hears normal conversation without difficulty

## 2018-01-11 NOTE — H&P PST ADULT - ATTENDING COMMENTS
The pt reports more than 90% improvement in her anorectal pain. She has stopped actually using the NTG oint (does not need it anymore) and used only 2-3 days last week when she had a minor relapse of the pain. Currently comfortable. I do not see a reason to proceed with EUA and Botox. She has upcoming Ortho surgeries. I explained the Botox is not "prophylactic" to prevent fissure recurrence. I strongly emphasized that she needs to continue her bowel regimen and that she needs to see me in the office for a complete anorectal exam in 1-2 weeks (exam had been very limited in the office at her first visit d/t pain).   Pt agrees with above plan and recommendations. I therefore cancelled the procedure, not needed at this time.
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
## 2018-01-12 ENCOUNTER — APPOINTMENT (OUTPATIENT)
Dept: ORTHOPEDIC SURGERY | Facility: CLINIC | Age: 38
End: 2018-01-12
Payer: OTHER MISCELLANEOUS

## 2018-01-12 VITALS
HEIGHT: 64 IN | SYSTOLIC BLOOD PRESSURE: 129 MMHG | WEIGHT: 142 LBS | HEART RATE: 79 BPM | BODY MASS INDEX: 24.24 KG/M2 | DIASTOLIC BLOOD PRESSURE: 80 MMHG

## 2018-01-12 LAB
ANION GAP SERPL CALC-SCNC: 13 MMOL/L — SIGNIFICANT CHANGE UP (ref 5–17)
BUN SERPL-MCNC: 8 MG/DL — SIGNIFICANT CHANGE UP (ref 7–23)
CALCIUM SERPL-MCNC: 9.3 MG/DL — SIGNIFICANT CHANGE UP (ref 8.4–10.5)
CHLORIDE SERPL-SCNC: 102 MMOL/L — SIGNIFICANT CHANGE UP (ref 96–108)
CO2 SERPL-SCNC: 28 MMOL/L — SIGNIFICANT CHANGE UP (ref 22–31)
CREAT SERPL-MCNC: 1.2 MG/DL — SIGNIFICANT CHANGE UP (ref 0.5–1.3)
GLUCOSE SERPL-MCNC: 69 MG/DL — LOW (ref 70–99)
POTASSIUM SERPL-MCNC: 4.4 MMOL/L — SIGNIFICANT CHANGE UP (ref 3.5–5.3)
POTASSIUM SERPL-SCNC: 4.4 MMOL/L — SIGNIFICANT CHANGE UP (ref 3.5–5.3)
SODIUM SERPL-SCNC: 143 MMOL/L — SIGNIFICANT CHANGE UP (ref 135–145)

## 2018-01-12 PROCEDURE — 99214 OFFICE O/P EST MOD 30 MIN: CPT

## 2018-01-16 ENCOUNTER — APPOINTMENT (OUTPATIENT)
Dept: SURGERY | Facility: HOSPITAL | Age: 38
End: 2018-01-16

## 2018-01-22 ENCOUNTER — APPOINTMENT (OUTPATIENT)
Dept: ORTHOPEDIC SURGERY | Facility: CLINIC | Age: 38
End: 2018-01-22
Payer: COMMERCIAL

## 2018-01-22 VITALS
DIASTOLIC BLOOD PRESSURE: 80 MMHG | HEIGHT: 64 IN | BODY MASS INDEX: 24.59 KG/M2 | WEIGHT: 144 LBS | SYSTOLIC BLOOD PRESSURE: 113 MMHG | HEART RATE: 85 BPM

## 2018-01-22 PROCEDURE — 99214 OFFICE O/P EST MOD 30 MIN: CPT

## 2018-01-29 ENCOUNTER — APPOINTMENT (OUTPATIENT)
Dept: INTERNAL MEDICINE | Facility: CLINIC | Age: 38
End: 2018-01-29

## 2018-01-30 ENCOUNTER — APPOINTMENT (OUTPATIENT)
Dept: INTERNAL MEDICINE | Facility: CLINIC | Age: 38
End: 2018-01-30

## 2018-03-02 ENCOUNTER — APPOINTMENT (OUTPATIENT)
Dept: ORTHOPEDIC SURGERY | Facility: CLINIC | Age: 38
End: 2018-03-02
Payer: OTHER MISCELLANEOUS

## 2018-03-02 VITALS — SYSTOLIC BLOOD PRESSURE: 116 MMHG | DIASTOLIC BLOOD PRESSURE: 79 MMHG | HEART RATE: 72 BPM

## 2018-03-02 VITALS — HEIGHT: 64 IN | WEIGHT: 144 LBS | BODY MASS INDEX: 24.59 KG/M2

## 2018-03-02 PROCEDURE — 99214 OFFICE O/P EST MOD 30 MIN: CPT

## 2018-05-04 ENCOUNTER — APPOINTMENT (OUTPATIENT)
Dept: ORTHOPEDIC SURGERY | Facility: CLINIC | Age: 38
End: 2018-05-04
Payer: COMMERCIAL

## 2018-05-04 VITALS
WEIGHT: 145 LBS | SYSTOLIC BLOOD PRESSURE: 128 MMHG | HEART RATE: 78 BPM | BODY MASS INDEX: 24.75 KG/M2 | HEIGHT: 64 IN | DIASTOLIC BLOOD PRESSURE: 87 MMHG

## 2018-05-04 PROCEDURE — 99214 OFFICE O/P EST MOD 30 MIN: CPT

## 2018-05-07 ENCOUNTER — APPOINTMENT (OUTPATIENT)
Dept: ORTHOPEDIC SURGERY | Facility: CLINIC | Age: 38
End: 2018-05-07

## 2018-07-17 PROBLEM — R07.9 CHEST PAIN, UNSPECIFIED: Chronic | Status: ACTIVE | Noted: 2018-01-11

## 2018-07-17 PROBLEM — K60.0 ACUTE ANAL FISSURE: Chronic | Status: ACTIVE | Noted: 2018-01-11

## 2018-07-18 ENCOUNTER — APPOINTMENT (OUTPATIENT)
Dept: ORTHOPEDIC SURGERY | Facility: CLINIC | Age: 38
End: 2018-07-18
Payer: OTHER MISCELLANEOUS

## 2018-07-18 VITALS
HEIGHT: 64 IN | WEIGHT: 148 LBS | HEART RATE: 70 BPM | BODY MASS INDEX: 25.27 KG/M2 | SYSTOLIC BLOOD PRESSURE: 102 MMHG | DIASTOLIC BLOOD PRESSURE: 70 MMHG

## 2018-07-18 PROCEDURE — 99214 OFFICE O/P EST MOD 30 MIN: CPT

## 2018-07-25 ENCOUNTER — APPOINTMENT (OUTPATIENT)
Dept: ORTHOPEDIC SURGERY | Facility: CLINIC | Age: 38
End: 2018-07-25
Payer: OTHER MISCELLANEOUS

## 2018-07-25 VITALS
WEIGHT: 148 LBS | HEIGHT: 64 IN | HEART RATE: 79 BPM | BODY MASS INDEX: 25.27 KG/M2 | SYSTOLIC BLOOD PRESSURE: 119 MMHG | DIASTOLIC BLOOD PRESSURE: 81 MMHG

## 2018-07-25 DIAGNOSIS — Z98.890 OTHER SPECIFIED POSTPROCEDURAL STATES: ICD-10-CM

## 2018-07-25 DIAGNOSIS — M75.82 OTHER SHOULDER LESIONS, LEFT SHOULDER: ICD-10-CM

## 2018-07-25 PROCEDURE — 99214 OFFICE O/P EST MOD 30 MIN: CPT

## 2018-08-07 ENCOUNTER — APPOINTMENT (OUTPATIENT)
Dept: PHYSICAL MEDICINE AND REHAB | Facility: CLINIC | Age: 38
End: 2018-08-07
Payer: OTHER MISCELLANEOUS

## 2018-08-07 DIAGNOSIS — G89.29 OTHER CHRONIC PAIN: ICD-10-CM

## 2018-08-07 PROCEDURE — 99203 OFFICE O/P NEW LOW 30 MIN: CPT

## 2018-08-15 ENCOUNTER — APPOINTMENT (OUTPATIENT)
Dept: ORTHOPEDIC SURGERY | Facility: CLINIC | Age: 38
End: 2018-08-15
Payer: COMMERCIAL

## 2018-08-15 VITALS
BODY MASS INDEX: 25.27 KG/M2 | SYSTOLIC BLOOD PRESSURE: 116 MMHG | DIASTOLIC BLOOD PRESSURE: 81 MMHG | HEART RATE: 75 BPM | HEIGHT: 64 IN | WEIGHT: 148 LBS

## 2018-08-15 PROCEDURE — 99214 OFFICE O/P EST MOD 30 MIN: CPT

## 2018-08-31 ENCOUNTER — APPOINTMENT (OUTPATIENT)
Dept: ORTHOPEDIC SURGERY | Facility: CLINIC | Age: 38
End: 2018-08-31
Payer: OTHER MISCELLANEOUS

## 2018-08-31 ENCOUNTER — APPOINTMENT (OUTPATIENT)
Dept: PHYSICAL MEDICINE AND REHAB | Facility: CLINIC | Age: 38
End: 2018-08-31
Payer: OTHER MISCELLANEOUS

## 2018-08-31 VITALS — HEART RATE: 69 BPM | DIASTOLIC BLOOD PRESSURE: 74 MMHG | SYSTOLIC BLOOD PRESSURE: 114 MMHG | OXYGEN SATURATION: 98 %

## 2018-08-31 VITALS
DIASTOLIC BLOOD PRESSURE: 75 MMHG | HEART RATE: 65 BPM | SYSTOLIC BLOOD PRESSURE: 111 MMHG | BODY MASS INDEX: 25.1 KG/M2 | WEIGHT: 147 LBS | HEIGHT: 64 IN

## 2018-08-31 PROCEDURE — 99214 OFFICE O/P EST MOD 30 MIN: CPT

## 2018-09-17 ENCOUNTER — APPOINTMENT (OUTPATIENT)
Dept: PHYSICAL MEDICINE AND REHAB | Facility: CLINIC | Age: 38
End: 2018-09-17

## 2018-09-21 ENCOUNTER — APPOINTMENT (OUTPATIENT)
Dept: ORTHOPEDIC SURGERY | Facility: CLINIC | Age: 38
End: 2018-09-21
Payer: COMMERCIAL

## 2018-09-21 VITALS
DIASTOLIC BLOOD PRESSURE: 80 MMHG | BODY MASS INDEX: 25.27 KG/M2 | HEART RATE: 76 BPM | HEIGHT: 64 IN | SYSTOLIC BLOOD PRESSURE: 113 MMHG | WEIGHT: 148 LBS

## 2018-09-21 DIAGNOSIS — M51.26 OTHER INTERVERTEBRAL DISC DISPLACEMENT, LUMBAR REGION: ICD-10-CM

## 2018-09-21 PROCEDURE — 96372 THER/PROPH/DIAG INJ SC/IM: CPT

## 2018-09-21 PROCEDURE — 99214 OFFICE O/P EST MOD 30 MIN: CPT | Mod: 25

## 2018-09-21 RX ORDER — ESOMEPRAZOLE MAGNESIUM 20 MG/1
20 CAPSULE, DELAYED RELEASE ORAL DAILY
Qty: 30 | Refills: 0 | Status: ACTIVE | COMMUNITY
Start: 2017-05-12 | End: 1900-01-01

## 2018-09-21 RX ORDER — KETOROLAC TROMETHAMINE 30 MG/ML
30 INJECTION, SOLUTION INTRAMUSCULAR; INTRAVENOUS
Qty: 1 | Refills: 0 | Status: COMPLETED | OUTPATIENT
Start: 2018-09-21

## 2018-09-21 RX ADMIN — KETOROLAC TROMETHAMINE 0 MG/ML: 30 INJECTION, SOLUTION INTRAMUSCULAR; INTRAVENOUS at 00:00

## 2018-10-11 ENCOUNTER — RESULT REVIEW (OUTPATIENT)
Age: 38
End: 2018-10-11

## 2018-11-14 ENCOUNTER — APPOINTMENT (OUTPATIENT)
Dept: ORTHOPEDIC SURGERY | Facility: CLINIC | Age: 38
End: 2018-11-14
Payer: COMMERCIAL

## 2018-11-14 VITALS
DIASTOLIC BLOOD PRESSURE: 80 MMHG | SYSTOLIC BLOOD PRESSURE: 114 MMHG | WEIGHT: 147 LBS | HEIGHT: 64 IN | BODY MASS INDEX: 25.1 KG/M2 | HEART RATE: 69 BPM

## 2018-11-14 PROCEDURE — 99214 OFFICE O/P EST MOD 30 MIN: CPT

## 2018-11-19 ENCOUNTER — APPOINTMENT (OUTPATIENT)
Dept: ORTHOPEDIC SURGERY | Facility: CLINIC | Age: 38
End: 2018-11-19
Payer: OTHER MISCELLANEOUS

## 2018-11-19 VITALS
WEIGHT: 147 LBS | SYSTOLIC BLOOD PRESSURE: 102 MMHG | HEART RATE: 77 BPM | BODY MASS INDEX: 25.1 KG/M2 | DIASTOLIC BLOOD PRESSURE: 67 MMHG | HEIGHT: 64 IN

## 2018-11-19 PROCEDURE — 99214 OFFICE O/P EST MOD 30 MIN: CPT

## 2019-01-02 ENCOUNTER — APPOINTMENT (OUTPATIENT)
Dept: ORTHOPEDIC SURGERY | Facility: CLINIC | Age: 39
End: 2019-01-02
Payer: OTHER MISCELLANEOUS

## 2019-01-02 VITALS
SYSTOLIC BLOOD PRESSURE: 115 MMHG | HEART RATE: 71 BPM | WEIGHT: 150 LBS | BODY MASS INDEX: 25.61 KG/M2 | HEIGHT: 64 IN | DIASTOLIC BLOOD PRESSURE: 77 MMHG

## 2019-01-02 DIAGNOSIS — M50.20 OTHER CERVICAL DISC DISPLACEMENT, UNSPECIFIED CERVICAL REGION: ICD-10-CM

## 2019-01-02 PROCEDURE — 99214 OFFICE O/P EST MOD 30 MIN: CPT

## 2019-01-03 ENCOUNTER — APPOINTMENT (OUTPATIENT)
Dept: ORTHOPEDIC SURGERY | Facility: CLINIC | Age: 39
End: 2019-01-03

## 2019-01-04 ENCOUNTER — APPOINTMENT (OUTPATIENT)
Dept: ORTHOPEDIC SURGERY | Facility: CLINIC | Age: 39
End: 2019-01-04

## 2019-01-18 ENCOUNTER — APPOINTMENT (OUTPATIENT)
Dept: ORTHOPEDIC SURGERY | Facility: CLINIC | Age: 39
End: 2019-01-18
Payer: COMMERCIAL

## 2019-01-18 VITALS
HEIGHT: 64 IN | WEIGHT: 150 LBS | SYSTOLIC BLOOD PRESSURE: 112 MMHG | HEART RATE: 73 BPM | DIASTOLIC BLOOD PRESSURE: 80 MMHG | BODY MASS INDEX: 25.61 KG/M2

## 2019-01-18 PROCEDURE — 99214 OFFICE O/P EST MOD 30 MIN: CPT

## 2019-02-04 ENCOUNTER — APPOINTMENT (OUTPATIENT)
Dept: SPINE | Facility: CLINIC | Age: 39
End: 2019-02-04
Payer: OTHER MISCELLANEOUS

## 2019-02-04 VITALS
WEIGHT: 150 LBS | HEIGHT: 64 IN | SYSTOLIC BLOOD PRESSURE: 117 MMHG | RESPIRATION RATE: 15 BRPM | DIASTOLIC BLOOD PRESSURE: 79 MMHG | OXYGEN SATURATION: 100 % | BODY MASS INDEX: 25.61 KG/M2 | HEART RATE: 74 BPM

## 2019-02-04 DIAGNOSIS — Z86.19 PERSONAL HISTORY OF OTHER INFECTIOUS AND PARASITIC DISEASES: ICD-10-CM

## 2019-02-04 DIAGNOSIS — M48.02 SPINAL STENOSIS, CERVICAL REGION: ICD-10-CM

## 2019-02-04 DIAGNOSIS — M54.5 LOW BACK PAIN: ICD-10-CM

## 2019-02-04 PROCEDURE — 99203 OFFICE O/P NEW LOW 30 MIN: CPT

## 2019-02-04 RX ORDER — METAXALONE 800 MG/1
TABLET ORAL
Refills: 0 | Status: ACTIVE | COMMUNITY

## 2019-02-04 RX ORDER — CYCLOBENZAPRINE HCL 10 MG
10 TABLET ORAL
Refills: 0 | Status: ACTIVE | COMMUNITY

## 2019-02-04 RX ORDER — NAPROXEN 500 MG/1
TABLET ORAL
Refills: 0 | Status: ACTIVE | COMMUNITY

## 2019-02-04 RX ORDER — CYCLOBENZAPRINE HCL 5 MG
5 TABLET ORAL
Refills: 0 | Status: DISCONTINUED | COMMUNITY
End: 2019-02-04

## 2019-02-04 NOTE — CONSULT LETTER
[Dear  ___] : Dear  [unfilled], [Courtesy Letter:] : I had the pleasure of seeing your patient, [unfilled], in my office today. [Please see my note below.] : Please see my note below. [Consult Closing:] : Thank you very much for allowing me to participate in the care of this patient.  If you have any questions, please do not hesitate to contact me. [Sincerely,] : Sincerely, [FreeTextEntry3] : Figueroa Keita MD\par Chief of Neurosurgery [facility]\par [facility]\par

## 2019-02-04 NOTE — ASSESSMENT
[FreeTextEntry1] : Chronic neck and back pain with radiculopathy. I suggested she try acupuncture as in my opinion, an L5-S1 fusion would have  only about a 50-50 chance of this substantially relieving her back and leg pain.
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
## 2019-02-04 NOTE — REVIEW OF SYSTEMS
[Numbness] : numbness [Heart Rate Is Slow] : the heart rate was not slow [Chest Pain] : no chest pain [Palpitations] : no palpitations [Shortness Of Breath] : no shortness of breath [Cough] : no cough [SOB on Exertion] : no shortness of breath during exertion [Negative] : Heme/Lymph

## 2019-02-04 NOTE — PHYSICAL EXAM
[Person] : oriented to person [Place] : oriented to place [Time] : oriented to time [Short Term Intact] : short term memory intact [Remote Intact] : remote memory intact [Span Intact] : the attention span was normal [Concentration Intact] : normal concentrating ability [Fluency] : fluency intact [Comprehension] : comprehension intact [Current Events] : adequate knowledge of current events [Past History] : adequate knowledge of personal past history [Vocabulary] : adequate range of vocabulary [Cranial Nerves Optic (II)] : visual acuity intact bilaterally,  pupils equal round and reactive to light [Cranial Nerves Oculomotor (III)] : extraocular motion intact [Cranial Nerves Trigeminal (V)] : facial sensation intact symmetrically [Cranial Nerves Facial (VII)] : face symmetrical [Cranial Nerves Vestibulocochlear (VIII)] : hearing was intact bilaterally [Cranial Nerves Glossopharyngeal (IX)] : tongue and palate midline [Cranial Nerves Accessory (XI - Cranial And Spinal)] : head turning and shoulder shrug symmetric [Cranial Nerves Hypoglossal (XII)] : there was no tongue deviation with protrusion [Motor Tone] : muscle tone was normal in all four extremities [Motor Strength] : muscle strength was normal in all four extremities [No Muscle Atrophy] : normal bulk in all four extremities [Sensation Tactile Decrease] : light touch was intact [Abnormal Walk] : normal gait [Balance] : balance was intact [2+] : Ankle jerk left 2+ [Able to toe walk] : the patient was able to toe walk [Able to heel walk] : the patient was able to heel walk [Past-pointing] : there was no past-pointing [Tremor] : no tremor present [Plantar Reflex Right Only] : normal on the right [Plantar Reflex Left Only] : normal on the left [___] : absent on the right [___] : absent on the left [Sahu] : Sahu's sign was not demonstrated [L'Hermitte's] : neck flexion did not produce tingling down the spine/arms [Spurling's - Opposite Side] : Negative Spurling's on opposite side [Spurling's Same Side] : Negative Spurling's on same side [Straight-Leg Raise Test - Left] : straight leg raise of the left leg was negative [Straight-Leg Raise Test - Right] : straight leg raise  of the right leg was negative

## 2019-02-11 ENCOUNTER — APPOINTMENT (OUTPATIENT)
Dept: PHYSICAL MEDICINE AND REHAB | Facility: CLINIC | Age: 39
End: 2019-02-11
Payer: OTHER MISCELLANEOUS

## 2019-02-11 VITALS
OXYGEN SATURATION: 98 % | DIASTOLIC BLOOD PRESSURE: 87 MMHG | TEMPERATURE: 97.6 F | HEART RATE: 74 BPM | SYSTOLIC BLOOD PRESSURE: 127 MMHG

## 2019-02-11 DIAGNOSIS — M54.16 RADICULOPATHY, LUMBAR REGION: ICD-10-CM

## 2019-02-11 PROCEDURE — 99214 OFFICE O/P EST MOD 30 MIN: CPT

## 2019-02-11 NOTE — PHYSICAL EXAM
[Normal] : Oriented to person, place, and time, insight and judgement were intact and the affect was normal [de-identified] : Lumbar spine pain w flex/ext; + pain to palpation lumbar paraspinals [de-identified] : + SLR on the right

## 2019-02-11 NOTE — ASSESSMENT
[FreeTextEntry1] : - PT\par - Accupuncture\par - Continue Flexeril (no gabapentin due to side effects)\par - Discussed options- prefer to try conservative measures prior to surgery)\par - Discussed with patient red flags such as bladder/bowel incontinence, weakness, significant increase in pain level in which case patient should immediately present to Emergency Room.\par

## 2019-02-11 NOTE — HISTORY OF PRESENT ILLNESS
[FreeTextEntry1] : Had lumbar fusion scheduled- however, surgeon had to re-schedule- can't do during school (going to school for nursing leadership)\par Has had a couple of falls. \par Gets pain down the left leg more than right.\par Hot type pain and tingling.\par Taking flexeril/metaxalone; gabapentin caused "crazy and aggressive". \par Didn't improve with CATHI.\par

## 2019-04-01 ENCOUNTER — CHART COPY (OUTPATIENT)
Age: 39
End: 2019-04-01

## 2019-04-10 ENCOUNTER — APPOINTMENT (OUTPATIENT)
Dept: ORTHOPEDIC SURGERY | Facility: CLINIC | Age: 39
End: 2019-04-10
Payer: COMMERCIAL

## 2019-04-10 VITALS
WEIGHT: 148 LBS | HEIGHT: 64 IN | DIASTOLIC BLOOD PRESSURE: 75 MMHG | SYSTOLIC BLOOD PRESSURE: 108 MMHG | HEART RATE: 77 BPM | BODY MASS INDEX: 25.27 KG/M2

## 2019-04-10 DIAGNOSIS — Z98.890 OTHER SPECIFIED POSTPROCEDURAL STATES: ICD-10-CM

## 2019-04-10 DIAGNOSIS — M51.36 OTHER INTERVERTEBRAL DISC DEGENERATION, LUMBAR REGION: ICD-10-CM

## 2019-04-10 DIAGNOSIS — R26.81 UNSTEADINESS ON FEET: ICD-10-CM

## 2019-04-10 PROCEDURE — 99214 OFFICE O/P EST MOD 30 MIN: CPT

## 2019-04-10 RX ORDER — PREDNISONE 20 MG/1
20 TABLET ORAL
Qty: 14 | Refills: 0 | Status: ACTIVE | COMMUNITY
Start: 2019-01-22

## 2019-04-10 RX ORDER — DICLOFENAC SODIUM 10 MG/G
1 GEL TOPICAL
Qty: 100 | Refills: 0 | Status: ACTIVE | COMMUNITY
Start: 2019-03-26

## 2019-04-10 RX ORDER — TERCONAZOLE 8 MG/G
0.8 CREAM VAGINAL
Qty: 20 | Refills: 0 | Status: ACTIVE | COMMUNITY
Start: 2018-10-11

## 2019-04-10 RX ORDER — AZITHROMYCIN 250 MG/1
250 TABLET, FILM COATED ORAL
Qty: 6 | Refills: 0 | Status: ACTIVE | COMMUNITY
Start: 2019-01-08

## 2019-04-10 NOTE — DISCUSSION/SUMMARY
[Medication Risks Reviewed] : Medication risks reviewed [de-identified] : The patient presents with symptoms consistent with this degeneration lumbar spine and associated lumbar radiculitis. We again discussed the option of proceeding with surgical intervention with decompression and fusion at L5-S1. She would like to avoid that option. For now she would like to try acupuncture and continue PT. She has reportedly been falling more but the MRI of the cervical spine performed recently does not reveal any progression of cervical stenosis.\par \par The patient was educated regarding their condition, treatment options as well as prescribed course of treatment. \par Risks and benefits as well as alternatives to the proposed treatment were also provided to the patient \par They were given the opportunity to have all their questions answered to their satisfaction.\par \par Vital signs were reviewed with the patient and the patient was instructed to followup with their primary care provider for further management.\par \par Healthy lifestyle recommendations were also made including a tobacco free lifestyle, proper diet, and weight control.
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
## 2019-04-10 NOTE — HISTORY OF PRESENT ILLNESS
[Worsening] : worsening [10] : a current pain level of 10/10 [Daily] : ~He/She~ states the symptoms seem to be occuring daily [Bending] : worsened by bending [Lifting] : worsened by lifting [Prolonged Sitting] : worsened by prolonged sitting [Prolonged Standing] : worsened by prolonged standing [Walking] : worsened by walking [Heat] : relieved by heat [Ice] : relieved by ice [Rest] : relieved by rest [de-identified] : Patient is here today for re evaluation on her cervical and lumbar pain. Patient states since her last office visit January increase in bilateral legs giving out due to increase in low back bilateral buttock pain right greater than left increase in low back spasms. Patient also states increase in neck bilateral arm weakness left sided. [de-identified] : naprosyn flexeril

## 2019-04-12 ENCOUNTER — APPOINTMENT (OUTPATIENT)
Dept: ORTHOPEDIC SURGERY | Facility: CLINIC | Age: 39
End: 2019-04-12

## 2019-05-06 ENCOUNTER — APPOINTMENT (OUTPATIENT)
Dept: ORTHOPEDIC SURGERY | Facility: CLINIC | Age: 39
End: 2019-05-06
Payer: OTHER MISCELLANEOUS

## 2019-05-06 VITALS
SYSTOLIC BLOOD PRESSURE: 121 MMHG | HEART RATE: 71 BPM | HEIGHT: 64 IN | WEIGHT: 155 LBS | BODY MASS INDEX: 26.46 KG/M2 | DIASTOLIC BLOOD PRESSURE: 83 MMHG

## 2019-05-06 DIAGNOSIS — M50.30 OTHER CERVICAL DISC DEGENERATION, UNSPECIFIED CERVICAL REGION: ICD-10-CM

## 2019-05-06 PROCEDURE — 99214 OFFICE O/P EST MOD 30 MIN: CPT

## 2019-05-06 RX ORDER — DICLOFENAC SODIUM 3 G/100G
3 GEL TOPICAL TWICE DAILY
Qty: 1 | Refills: 0 | Status: ACTIVE | COMMUNITY
Start: 2019-05-06 | End: 1900-01-01

## 2019-05-06 RX ORDER — BACLOFEN 10 MG/1
10 TABLET ORAL 3 TIMES DAILY
Qty: 30 | Refills: 0 | Status: ACTIVE | COMMUNITY
Start: 2019-05-06 | End: 1900-01-01

## 2019-05-06 RX ORDER — METAXALONE 800 MG/1
800 TABLET ORAL TWICE DAILY
Qty: 60 | Refills: 0 | Status: DISCONTINUED | COMMUNITY
Start: 2018-11-28 | End: 2019-05-06

## 2019-05-23 NOTE — DISCUSSION/SUMMARY
[Medication Risks Reviewed] : Medication risks reviewed [de-identified] : The patient has reported significant neck pain and stiffness. A rigid cervical collar was prescribed for her along with physical therapy. A referral for pain management was also provided today. Prescribed her baclofen for symptomatic relief along with diclofenac 3% gel. She currently is not interested in surgical intervention I recommended a rehabilitation physician followup as well along with pain management for her symptoms\par \par The patient was educated regarding their condition, treatment options as well as prescribed course of treatment. \par Risks and benefits as well as alternatives to the proposed treatment were also provided to the patient \par They were given the opportunity to have all their questions answered to their satisfaction.\par \par Vital signs were reviewed with the patient and the patient was instructed to followup with their primary care provider for further management.\par \par Healthy lifestyle recommendations were also made including a tobacco free lifestyle, proper diet, and weight control.

## 2019-05-23 NOTE — PHYSICAL EXAM
[Normal] : normal [UE] : Sensory: Intact in bilateral upper extremities [Bicep] : biceps 2+ and symmetric bilaterally [B.R.] : biceps 2+ and symmetric bilaterally [Rad] : radial 2+ and symmetric bilaterally [Tricep] : triceps 2+ and symmetric bilaterally [Painful] : is painful [Limited] : is limited [Poor Appearance] : well-appearing [Acute Distress] : not in acute distress [Obese] : not obese [Abl Mood] : in a normal mood [Abl Affect] : with normal affect [Poor Coordination] : normal coordination [Disorientation] : oriented x 3 [Sahu's Sign] : negative Sahu's sign [FreeTextEntry2] : The pt is awake, alert and oriented to self, place and time, is uncomfortable but in no acute distress. Gait evaluation reveals a narrow based, non-ataxic, non-antalgic gait. Negative Romberg sign noted. Can heel and toe walk without difficulty. Inspection of the neck, back and upper extremities bilaterally reveals no rashes or ecchymotic lesions. There is no obvious abnormal spinal curvature in the sagittal and coronal planes. No crepitus or instability noted with range of motion of bilateral upper extremities. No joint laxity noted in the upper and lower extremities bilaterally. No atrophy or abnormal movements noted in the upper or lower extremities.Paraspinal cervical tenderness left more than right. Limited range of motion noted in the cervical spine with flexion of 30 extension to 30 and left lateral rotation 30° right lateral Rotation of 40°. No tenderness over the thoracic, lumbar spine or in the upper and lower extremity musculature. Left paraspinal cervical tenderness noted. There is no swelling noted in the upper or lower extremities bilaterally. No cervical lymphadenopathy noted anteriorly.\par Negative Spurling's sign bilaterally. \par Negative Babinski sign and no clonus bilaterally in the upper or lower extremities. [de-identified] : cervical spine [de-identified] : healed right shoulder arthroscopy portals, left shoulder stiffness, \par Limited painful ROM of the right shoulder - FE 90 degrees, ER 40. IR to her side with increased neck pain and tighness

## 2019-05-23 NOTE — REASON FOR VISIT
[Follow-Up Visit] : a follow-up visit for [FreeTextEntry2] : Cervical stenosis/HNP/DDD   COMP 7/24/02 not working

## 2019-05-23 NOTE — HISTORY OF PRESENT ILLNESS
[Pain Location] : pain [Worsening] : worsening [___ wks] : [unfilled] week(s) ago [10] : a maximum pain level of 10/10 [Walking] : walking [Sitting] : sitting [Standing] : standing [Constant] : ~He/She~ states the symptoms seem to be constant [Bending] : worsened by bending [Prolonged Standing] : worsened by prolonged standing [de-identified] : Patient comes in today stating she is having excruciating pain in neck and radiates to upper back, left more than right. Also with headaches.  Patient has had recent pain for 2 week, takes Flexeril and Naprosyn as needed w/some relief.  Patient has loss of sleep and cries a lot.Rare tingling of left hand fingers.\par Sitting down hypextension of neck had neck pain with spasms and right lower back pain.\par Not had acupuncture yet.\par Has not seen pain management specialist.

## 2019-05-24 ENCOUNTER — APPOINTMENT (OUTPATIENT)
Dept: ORTHOPEDIC SURGERY | Facility: CLINIC | Age: 39
End: 2019-05-24

## 2019-06-04 ENCOUNTER — RX RENEWAL (OUTPATIENT)
Age: 39
End: 2019-06-04

## 2019-06-04 RX ORDER — OMEPRAZOLE 40 MG/1
40 CAPSULE, DELAYED RELEASE ORAL
Qty: 30 | Refills: 0 | Status: ACTIVE | COMMUNITY
Start: 2017-04-05 | End: 1900-01-01

## 2019-08-20 ENCOUNTER — APPOINTMENT (OUTPATIENT)
Dept: PHYSICAL MEDICINE AND REHAB | Facility: CLINIC | Age: 39
End: 2019-08-20
Payer: OTHER MISCELLANEOUS

## 2019-08-20 PROCEDURE — 99213 OFFICE O/P EST LOW 20 MIN: CPT

## 2019-08-20 NOTE — HISTORY OF PRESENT ILLNESS
[FreeTextEntry1] : Here today for neck pain. \par Pain persists- gets spasms into the traps.\par Exacerbated when she raises her right arm. Neck hurts more on the left than the right. \par Occasionally radiates to the hands. \par Can be severe at times.\par Worse when not taking medications. \par Currently taking Dilaudid (weaning), Valium, and Flexeril. \par Has not had any injections.

## 2019-08-20 NOTE — PHYSICAL EXAM
[Normal] : Oriented to person, place, and time, insight and judgement were intact and the affect was normal [de-identified] : + tenderness with palpation bl trap and rhomboids with hypertonicity  [de-identified] : Cervical spine decreased rotation bilateral rotation and w bl sidebending

## 2019-08-20 NOTE — ASSESSMENT
[FreeTextEntry1] : - PT\par - Continue Valium and Flexeril (no gabapentin due to side effects)\par - Referred for possible interventional spine procedure. \par - Discussed with patient red flags such as bladder/bowel incontinence, weakness, significant increase in pain level in which case patient should immediately present to Emergency Room.\par

## 2019-08-20 NOTE — DATA REVIEWED
[MRI] : MRI [FreeTextEntry1] : MRI of cervical spine 8/10/19- Multilevel disc ridge complexes C3-4 through C5-6.  Mild canal stenosis at C5-6 and C6-7, multilevel region of neural foraminal narrowing

## 2019-10-18 ENCOUNTER — APPOINTMENT (OUTPATIENT)
Dept: PHYSICAL MEDICINE AND REHAB | Facility: CLINIC | Age: 39
End: 2019-10-18
Payer: OTHER MISCELLANEOUS

## 2019-10-18 ENCOUNTER — RX RENEWAL (OUTPATIENT)
Age: 39
End: 2019-10-18

## 2019-10-18 PROCEDURE — 99213 OFFICE O/P EST LOW 20 MIN: CPT

## 2019-10-18 NOTE — PHYSICAL EXAM
[Normal] : Oriented to person, place, and time, insight and judgement were intact and the affect was normal [de-identified] : + tenderness with palpation bl trap and rhomboids with hypertonicity  [de-identified] : Cervical spine decreased rotation bilateral rotation and w bl sidebending [de-identified] : DTRs 3+ bl UEs, Motor 5/5

## 2019-10-18 NOTE — ASSESSMENT
[FreeTextEntry1] : - PT was denied- patient requires PT to improve ROM and diminish muscle spasm- gets some improvement with medications but still with intermittent exacerbations and currently has has an exacerbation of pain/spasm and medications cause side effects. \par - Continue Dilaudid, Valium and Flexeril (no gabapentin due to side effects)\par - Referred for possible interventional spine procedure. \par - Discussed with patient red flags such as bladder/bowel incontinence, weakness, significant increase in pain level in which case patient should immediately present to Emergency Room.\par

## 2019-11-01 ENCOUNTER — APPOINTMENT (OUTPATIENT)
Dept: PHYSICAL MEDICINE AND REHAB | Facility: CLINIC | Age: 39
End: 2019-11-01
Payer: OTHER MISCELLANEOUS

## 2019-11-01 VITALS
RESPIRATION RATE: 15 BRPM | OXYGEN SATURATION: 100 % | HEART RATE: 65 BPM | WEIGHT: 150 LBS | BODY MASS INDEX: 25.61 KG/M2 | HEIGHT: 64 IN

## 2019-11-01 PROCEDURE — 99215 OFFICE O/P EST HI 40 MIN: CPT

## 2019-11-02 NOTE — PHYSICAL EXAM
[Normal Station and Gait] : the gait and station were normal [Normal Motor Tone] : the muscle tone was normal [None] : no muscle rigidity was observed [Normal] : Oriented to person, place, and time, insight and judgement were intact and the affect was normal [4] : C7  elbow extension 4/5 [5] : T1 small finger abduction 5/5 [de-identified] : no gross deformity, TTP over bilateral cervical paraspinals, ROM limited in all planes (flexion of 30 extension to 30 and left lateral rotation 30° right lateral Rotation of 40°), positive Spurling's bilaterally, negative Sahu's\par \par \par

## 2019-11-02 NOTE — ASSESSMENT
[FreeTextEntry1] : Left C7/T1 ILESI\par \par Epidural steroid injection is medically necessary given the persistent and severe neck pain. Patient complains of radicular pain down the bilateral upper extremities. MRI scan is consistent with Cervical Radiculopathy. The symptom greatly affect patient's daily life and function. Patient's symptom and physical examination are consistent with the diagnosis of cervical radiculopathy. Patient has failed physician guided conservative treatments for over 6 weeks therefore an epidural steroid injection is the next step in management of the patient's symptoms.\par

## 2019-11-02 NOTE — HISTORY OF PRESENT ILLNESS
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 Due to this, she began developing neck pain. \par Frequency: intermittent, but frequent \par Alleviating Factors: Flexeril, NSAIDs in past\par Aggravating Factors: turning head left or right (left side worse than right), abducting arms over head\par Conservative treatment tried: PT, NSAIDs\par Associated Symptoms: paresthesia in both hands\par Prior Studies: MRI of C Spine\par

## 2019-11-02 NOTE — DATA REVIEWED
[FreeTextEntry1] : MRI of 01/2019 shows multi level degenerative changes incluide C3/4 - C6/7 facet hypertrophy and degenerative discs. There is also varying degrees of foraminal stenosis at C4/5 - C6/7.

## 2019-11-29 ENCOUNTER — APPOINTMENT (OUTPATIENT)
Dept: PHYSICAL MEDICINE AND REHAB | Facility: CLINIC | Age: 39
End: 2019-11-29
Payer: OTHER MISCELLANEOUS

## 2019-11-29 VITALS — SYSTOLIC BLOOD PRESSURE: 118 MMHG | DIASTOLIC BLOOD PRESSURE: 85 MMHG

## 2019-11-29 VITALS — HEIGHT: 64 IN | WEIGHT: 150 LBS | BODY MASS INDEX: 25.61 KG/M2 | HEART RATE: 82 BPM | OXYGEN SATURATION: 100 %

## 2019-11-29 DIAGNOSIS — M54.16 RADICULOPATHY, LUMBAR REGION: ICD-10-CM

## 2019-11-29 PROCEDURE — 99214 OFFICE O/P EST MOD 30 MIN: CPT
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
## 2019-12-02 NOTE — PHYSICAL EXAM
[Normal] : Deep tendon reflexes were 2+ and symmetric, the sensory exam was normal to light touch and pinprick and no focal deficits [3] : L5 great toe extension 3/5 [4] : S1 ankle flexors 4/5 [5] : L5 great toe extension 5/5 [Physical Disability Temporary Total] : temporarily total disabled [Not Fit For Work] : Not fit for work [FreeTextEntry1] : \par  [de-identified] : No gross deformity, TTP of the lumbar paraspinals: bilaterally, ROM limited with all planes (flexion: 30 degrees, ext: 10 degrees), SLR positive: negative

## 2019-12-02 NOTE — ASSESSMENT
[FreeTextEntry1] : Patient also has right sided troachanteric bursitis, consider right sided troch bursa injection. Patient will think about it and schedule appointment. \par \par Awaiting repeat MRI of L spine. Follow up with spine surgeon and will discuss options with him about this.

## 2019-12-10 ENCOUNTER — APPOINTMENT (OUTPATIENT)
Dept: PHYSICAL MEDICINE AND REHAB | Facility: CLINIC | Age: 39
End: 2019-12-10

## 2019-12-10 ENCOUNTER — OUTPATIENT (OUTPATIENT)
Dept: OUTPATIENT SERVICES | Facility: HOSPITAL | Age: 39
LOS: 1 days | End: 2019-12-10
Payer: COMMERCIAL

## 2019-12-10 DIAGNOSIS — Z98.89 OTHER SPECIFIED POSTPROCEDURAL STATES: Chronic | ICD-10-CM

## 2019-12-10 DIAGNOSIS — Z98.890 OTHER SPECIFIED POSTPROCEDURAL STATES: Chronic | ICD-10-CM

## 2019-12-10 DIAGNOSIS — M54.12 RADICULOPATHY, CERVICAL REGION: ICD-10-CM

## 2019-12-10 PROCEDURE — 62321 NJX INTERLAMINAR CRV/THRC: CPT

## 2019-12-13 DIAGNOSIS — M54.13 RADICULOPATHY, CERVICOTHORACIC REGION: ICD-10-CM

## 2019-12-27 ENCOUNTER — APPOINTMENT (OUTPATIENT)
Dept: PHYSICAL MEDICINE AND REHAB | Facility: CLINIC | Age: 39
End: 2019-12-27
Payer: OTHER MISCELLANEOUS

## 2019-12-27 VITALS — OXYGEN SATURATION: 96 % | DIASTOLIC BLOOD PRESSURE: 81 MMHG | SYSTOLIC BLOOD PRESSURE: 119 MMHG | HEART RATE: 71 BPM

## 2019-12-27 DIAGNOSIS — M54.12 RADICULOPATHY, CERVICAL REGION: ICD-10-CM

## 2019-12-27 PROCEDURE — 99213 OFFICE O/P EST LOW 20 MIN: CPT

## 2019-12-30 NOTE — ASSESSMENT
[FreeTextEntry1] : No improvement after ELEONORA (in fact patient reports feeling worse for a few days immediately after), recommend resuming PT at this time and also adding acupuncture. \par \par \par Patient reported that when she had done PT in the past she had better ROM, less pain, and less need for pain medication. She has not had PT in the last 6 months or more. At this time I would recommend resuming PT. \par \par \par I also recommend adding acupuncture as another discipline to help control her pain in addition to PT. \par \par I do not recommend further CATHI 's injections in the future do to her poor response from this. \par \par If no improvement consider surgical consult.

## 2019-12-30 NOTE — PHYSICAL EXAM
[FreeTextEntry1] : MMT UE: \par \par Right: C5 elbow flexion 5/5, C6 wrist extensors 5/5, C7 elbow extension 4/5, C8 middle finger extension 5/5. \par Left: C5 elbow flexion 5/5, C6 wrist extensors 5/5, C7 elbow extension 4/5, C8 middle finger extension 5/5, T1 small finger abduction 5/5.  [Normal Motor Tone] : the muscle tone was normal [None] : no muscle rigidity was observed [Involuntary Movements] : no involuntary movements were seen [Normal] : Oriented to person, place, and time, insight and judgement were intact and the affect was normal [de-identified] : no gross deformity, TTP over bilateral cervical paraspinals, ROM limited in all planes (flexion of 30 extension to 30 and left lateral rotation 30° right lateral Rotation of 40°), positive Spurling's bilaterally, negative Sahu's\par

## 2019-12-30 NOTE — HISTORY OF PRESENT ILLNESS
[FreeTextEntry1] : Patient had Left C7/T1 ILESI on 12/10/2019. Patient notes no improvement after ELEONORA. Pain is still severe. Patient reports she had increased headaches, tinnitus, and difficulty sleeping for 2 days following the injection. She stated this was not related to posture. She states she is back to her baseline level of pain now.

## 2020-12-07 NOTE — PATIENT PROFILE ADULT. - ABILITY TO HEAR (WITH HEARING AID OR HEARING APPLIANCE IF NORMALLY USED):
Adequate: hears normal conversation without difficulty
Procedure To Be Performed At Next Visit: Biopsy by shave method
Detail Level: Simple
Introduction Text (Please End With A Colon): The following procedure was deferred:

## 2022-03-05 NOTE — ED CDU PROVIDER NOTE - CONSTITUTIONAL NEGATIVE STATEMENT, MLM
Problem: Falls - Risk of  Goal: *Absence of Falls  Description: Document Deric Blight Fall Risk and appropriate interventions in the flowsheet. Outcome: Progressing Towards Goal  Note: Fall Risk Interventions:            Medication Interventions: Teach patient to arise slowly    Patient received resting quietly in bed. No signs of distress. Even and unlabored breathing. Staff will continue to monitor safety Q15 and provide support. no fever and no chills.

## 2022-12-21 NOTE — CONSULT NOTE ADULT - CONSULT REASON
ROS:  GENERAL: No fever, no chills  CARDIAC: no chest pain  PULMONARY: no cough, no shortness of breath  GI: no abdominal pain, no nausea, no vomiting, no diarrhea, no constipation  : No dysuria, no frequency, no change in appearance or odor of urine  SKIN: no rashes  NEURO: no headache, no weakness, no dizziness  MSK: As per HPI   All other systems reviewed as negative.
Back pain
back pain
chest pain/dizziness

## 2023-09-19 NOTE — ED CDU PROVIDER NOTE - CROS ED CONS ALL NEG
Per mom Pt vulva red with white discharge and \"Rough\" feeling. Started last week but got worse. Mom states pt got labs done in July, WBC was low, PMD stated not emergent but needs another count soon. Denies F/V?D, +po, +uop, normal BM, acting age appropriate in triage. no meds PTA.   negative...

## 2023-10-19 NOTE — ED CDU PROVIDER NOTE - PROGRESS
Rx Refill Note  Requested Prescriptions     Pending Prescriptions Disp Refills    glimepiride (AMARYL) 4 MG tablet 30 tablet 0     Sig: Take 1 tablet by mouth Every Morning Before Breakfast.      Last office visit with prescribing clinician: 10/5/2023   Last telemedicine visit with prescribing clinician: Visit date not found   Next office visit with prescribing clinician: Visit date not found   Last lab- active      Stable.

## 2023-12-23 ENCOUNTER — INPATIENT (INPATIENT)
Facility: HOSPITAL | Age: 43
LOS: 3 days | Discharge: HOME | End: 2023-12-27
Attending: HOSPITALIST | Admitting: HOSPITALIST
Payer: COMMERCIAL

## 2023-12-23 VITALS
OXYGEN SATURATION: 99 % | HEART RATE: 78 BPM | TEMPERATURE: 98 F | RESPIRATION RATE: 16 BRPM | SYSTOLIC BLOOD PRESSURE: 146 MMHG | DIASTOLIC BLOOD PRESSURE: 95 MMHG

## 2023-12-23 DIAGNOSIS — Z98.89 OTHER SPECIFIED POSTPROCEDURAL STATES: Chronic | ICD-10-CM

## 2023-12-23 DIAGNOSIS — Z98.890 OTHER SPECIFIED POSTPROCEDURAL STATES: Chronic | ICD-10-CM

## 2023-12-23 LAB
ALBUMIN SERPL ELPH-MCNC: 3.8 G/DL — SIGNIFICANT CHANGE UP (ref 3.3–5)
ALBUMIN SERPL ELPH-MCNC: 3.8 G/DL — SIGNIFICANT CHANGE UP (ref 3.3–5)
ALP SERPL-CCNC: 50 U/L — SIGNIFICANT CHANGE UP (ref 40–120)
ALP SERPL-CCNC: 50 U/L — SIGNIFICANT CHANGE UP (ref 40–120)
ALT FLD-CCNC: 12 U/L — SIGNIFICANT CHANGE UP (ref 4–33)
ALT FLD-CCNC: 12 U/L — SIGNIFICANT CHANGE UP (ref 4–33)
ANION GAP SERPL CALC-SCNC: 9 MMOL/L — SIGNIFICANT CHANGE UP (ref 7–14)
ANION GAP SERPL CALC-SCNC: 9 MMOL/L — SIGNIFICANT CHANGE UP (ref 7–14)
APPEARANCE UR: CLEAR — SIGNIFICANT CHANGE UP
APPEARANCE UR: CLEAR — SIGNIFICANT CHANGE UP
AST SERPL-CCNC: 19 U/L — SIGNIFICANT CHANGE UP (ref 4–32)
AST SERPL-CCNC: 19 U/L — SIGNIFICANT CHANGE UP (ref 4–32)
BACTERIA # UR AUTO: NEGATIVE /HPF — SIGNIFICANT CHANGE UP
BACTERIA # UR AUTO: NEGATIVE /HPF — SIGNIFICANT CHANGE UP
BASOPHILS # BLD AUTO: 0.05 K/UL — SIGNIFICANT CHANGE UP (ref 0–0.2)
BASOPHILS # BLD AUTO: 0.05 K/UL — SIGNIFICANT CHANGE UP (ref 0–0.2)
BASOPHILS NFR BLD AUTO: 1 % — SIGNIFICANT CHANGE UP (ref 0–2)
BASOPHILS NFR BLD AUTO: 1 % — SIGNIFICANT CHANGE UP (ref 0–2)
BILIRUB SERPL-MCNC: 0.2 MG/DL — SIGNIFICANT CHANGE UP (ref 0.2–1.2)
BILIRUB SERPL-MCNC: 0.2 MG/DL — SIGNIFICANT CHANGE UP (ref 0.2–1.2)
BILIRUB UR-MCNC: NEGATIVE — SIGNIFICANT CHANGE UP
BILIRUB UR-MCNC: NEGATIVE — SIGNIFICANT CHANGE UP
BUN SERPL-MCNC: 14 MG/DL — SIGNIFICANT CHANGE UP (ref 7–23)
BUN SERPL-MCNC: 14 MG/DL — SIGNIFICANT CHANGE UP (ref 7–23)
CALCIUM SERPL-MCNC: 9.1 MG/DL — SIGNIFICANT CHANGE UP (ref 8.4–10.5)
CALCIUM SERPL-MCNC: 9.1 MG/DL — SIGNIFICANT CHANGE UP (ref 8.4–10.5)
CAST: 0 /LPF — SIGNIFICANT CHANGE UP (ref 0–4)
CAST: 0 /LPF — SIGNIFICANT CHANGE UP (ref 0–4)
CHLORIDE SERPL-SCNC: 104 MMOL/L — SIGNIFICANT CHANGE UP (ref 98–107)
CHLORIDE SERPL-SCNC: 104 MMOL/L — SIGNIFICANT CHANGE UP (ref 98–107)
CO2 SERPL-SCNC: 26 MMOL/L — SIGNIFICANT CHANGE UP (ref 22–31)
CO2 SERPL-SCNC: 26 MMOL/L — SIGNIFICANT CHANGE UP (ref 22–31)
COLOR SPEC: YELLOW — SIGNIFICANT CHANGE UP
COLOR SPEC: YELLOW — SIGNIFICANT CHANGE UP
CREAT SERPL-MCNC: 1.2 MG/DL — SIGNIFICANT CHANGE UP (ref 0.5–1.3)
CREAT SERPL-MCNC: 1.2 MG/DL — SIGNIFICANT CHANGE UP (ref 0.5–1.3)
D DIMER BLD IA.RAPID-MCNC: <150 NG/ML DDU — SIGNIFICANT CHANGE UP
D DIMER BLD IA.RAPID-MCNC: <150 NG/ML DDU — SIGNIFICANT CHANGE UP
DIFF PNL FLD: ABNORMAL
DIFF PNL FLD: ABNORMAL
EGFR: 57 ML/MIN/1.73M2 — LOW
EGFR: 57 ML/MIN/1.73M2 — LOW
EOSINOPHIL # BLD AUTO: 0.26 K/UL — SIGNIFICANT CHANGE UP (ref 0–0.5)
EOSINOPHIL # BLD AUTO: 0.26 K/UL — SIGNIFICANT CHANGE UP (ref 0–0.5)
EOSINOPHIL NFR BLD AUTO: 5.1 % — SIGNIFICANT CHANGE UP (ref 0–6)
EOSINOPHIL NFR BLD AUTO: 5.1 % — SIGNIFICANT CHANGE UP (ref 0–6)
GLUCOSE SERPL-MCNC: 112 MG/DL — HIGH (ref 70–99)
GLUCOSE SERPL-MCNC: 112 MG/DL — HIGH (ref 70–99)
GLUCOSE UR QL: NEGATIVE MG/DL — SIGNIFICANT CHANGE UP
GLUCOSE UR QL: NEGATIVE MG/DL — SIGNIFICANT CHANGE UP
HCT VFR BLD CALC: 40.9 % — SIGNIFICANT CHANGE UP (ref 34.5–45)
HCT VFR BLD CALC: 40.9 % — SIGNIFICANT CHANGE UP (ref 34.5–45)
HGB BLD-MCNC: 13.2 G/DL — SIGNIFICANT CHANGE UP (ref 11.5–15.5)
HGB BLD-MCNC: 13.2 G/DL — SIGNIFICANT CHANGE UP (ref 11.5–15.5)
IANC: 2.95 K/UL — SIGNIFICANT CHANGE UP (ref 1.8–7.4)
IANC: 2.95 K/UL — SIGNIFICANT CHANGE UP (ref 1.8–7.4)
IMM GRANULOCYTES NFR BLD AUTO: 0.2 % — SIGNIFICANT CHANGE UP (ref 0–0.9)
IMM GRANULOCYTES NFR BLD AUTO: 0.2 % — SIGNIFICANT CHANGE UP (ref 0–0.9)
KETONES UR-MCNC: ABNORMAL MG/DL
KETONES UR-MCNC: ABNORMAL MG/DL
LEUKOCYTE ESTERASE UR-ACNC: ABNORMAL
LEUKOCYTE ESTERASE UR-ACNC: ABNORMAL
LYMPHOCYTES # BLD AUTO: 1.48 K/UL — SIGNIFICANT CHANGE UP (ref 1–3.3)
LYMPHOCYTES # BLD AUTO: 1.48 K/UL — SIGNIFICANT CHANGE UP (ref 1–3.3)
LYMPHOCYTES # BLD AUTO: 29.2 % — SIGNIFICANT CHANGE UP (ref 13–44)
LYMPHOCYTES # BLD AUTO: 29.2 % — SIGNIFICANT CHANGE UP (ref 13–44)
MCHC RBC-ENTMCNC: 29.3 PG — SIGNIFICANT CHANGE UP (ref 27–34)
MCHC RBC-ENTMCNC: 29.3 PG — SIGNIFICANT CHANGE UP (ref 27–34)
MCHC RBC-ENTMCNC: 32.3 GM/DL — SIGNIFICANT CHANGE UP (ref 32–36)
MCHC RBC-ENTMCNC: 32.3 GM/DL — SIGNIFICANT CHANGE UP (ref 32–36)
MCV RBC AUTO: 90.9 FL — SIGNIFICANT CHANGE UP (ref 80–100)
MCV RBC AUTO: 90.9 FL — SIGNIFICANT CHANGE UP (ref 80–100)
MONOCYTES # BLD AUTO: 0.32 K/UL — SIGNIFICANT CHANGE UP (ref 0–0.9)
MONOCYTES # BLD AUTO: 0.32 K/UL — SIGNIFICANT CHANGE UP (ref 0–0.9)
MONOCYTES NFR BLD AUTO: 6.3 % — SIGNIFICANT CHANGE UP (ref 2–14)
MONOCYTES NFR BLD AUTO: 6.3 % — SIGNIFICANT CHANGE UP (ref 2–14)
NEUTROPHILS # BLD AUTO: 2.95 K/UL — SIGNIFICANT CHANGE UP (ref 1.8–7.4)
NEUTROPHILS # BLD AUTO: 2.95 K/UL — SIGNIFICANT CHANGE UP (ref 1.8–7.4)
NEUTROPHILS NFR BLD AUTO: 58.2 % — SIGNIFICANT CHANGE UP (ref 43–77)
NEUTROPHILS NFR BLD AUTO: 58.2 % — SIGNIFICANT CHANGE UP (ref 43–77)
NITRITE UR-MCNC: NEGATIVE — SIGNIFICANT CHANGE UP
NITRITE UR-MCNC: NEGATIVE — SIGNIFICANT CHANGE UP
NRBC # BLD: 0 /100 WBCS — SIGNIFICANT CHANGE UP (ref 0–0)
NRBC # BLD: 0 /100 WBCS — SIGNIFICANT CHANGE UP (ref 0–0)
NRBC # FLD: 0 K/UL — SIGNIFICANT CHANGE UP (ref 0–0)
NRBC # FLD: 0 K/UL — SIGNIFICANT CHANGE UP (ref 0–0)
PH UR: 7 — SIGNIFICANT CHANGE UP (ref 5–8)
PH UR: 7 — SIGNIFICANT CHANGE UP (ref 5–8)
PLATELET # BLD AUTO: 270 K/UL — SIGNIFICANT CHANGE UP (ref 150–400)
PLATELET # BLD AUTO: 270 K/UL — SIGNIFICANT CHANGE UP (ref 150–400)
POTASSIUM SERPL-MCNC: 4.2 MMOL/L — SIGNIFICANT CHANGE UP (ref 3.5–5.3)
POTASSIUM SERPL-MCNC: 4.2 MMOL/L — SIGNIFICANT CHANGE UP (ref 3.5–5.3)
POTASSIUM SERPL-SCNC: 4.2 MMOL/L — SIGNIFICANT CHANGE UP (ref 3.5–5.3)
POTASSIUM SERPL-SCNC: 4.2 MMOL/L — SIGNIFICANT CHANGE UP (ref 3.5–5.3)
PROT SERPL-MCNC: 6.7 G/DL — SIGNIFICANT CHANGE UP (ref 6–8.3)
PROT SERPL-MCNC: 6.7 G/DL — SIGNIFICANT CHANGE UP (ref 6–8.3)
PROT UR-MCNC: NEGATIVE MG/DL — SIGNIFICANT CHANGE UP
PROT UR-MCNC: NEGATIVE MG/DL — SIGNIFICANT CHANGE UP
RBC # BLD: 4.5 M/UL — SIGNIFICANT CHANGE UP (ref 3.8–5.2)
RBC # BLD: 4.5 M/UL — SIGNIFICANT CHANGE UP (ref 3.8–5.2)
RBC # FLD: 12.6 % — SIGNIFICANT CHANGE UP (ref 10.3–14.5)
RBC # FLD: 12.6 % — SIGNIFICANT CHANGE UP (ref 10.3–14.5)
RBC CASTS # UR COMP ASSIST: 11 /HPF — HIGH (ref 0–4)
RBC CASTS # UR COMP ASSIST: 11 /HPF — HIGH (ref 0–4)
REVIEW: SIGNIFICANT CHANGE UP
REVIEW: SIGNIFICANT CHANGE UP
SODIUM SERPL-SCNC: 139 MMOL/L — SIGNIFICANT CHANGE UP (ref 135–145)
SODIUM SERPL-SCNC: 139 MMOL/L — SIGNIFICANT CHANGE UP (ref 135–145)
SP GR SPEC: 1.02 — SIGNIFICANT CHANGE UP (ref 1–1.03)
SP GR SPEC: 1.02 — SIGNIFICANT CHANGE UP (ref 1–1.03)
SQUAMOUS # UR AUTO: 2 /HPF — SIGNIFICANT CHANGE UP (ref 0–5)
SQUAMOUS # UR AUTO: 2 /HPF — SIGNIFICANT CHANGE UP (ref 0–5)
TROPONIN T, HIGH SENSITIVITY RESULT: <6 NG/L — SIGNIFICANT CHANGE UP
TROPONIN T, HIGH SENSITIVITY RESULT: <6 NG/L — SIGNIFICANT CHANGE UP
UROBILINOGEN FLD QL: 1 MG/DL — SIGNIFICANT CHANGE UP (ref 0.2–1)
UROBILINOGEN FLD QL: 1 MG/DL — SIGNIFICANT CHANGE UP (ref 0.2–1)
WBC # BLD: 5.07 K/UL — SIGNIFICANT CHANGE UP (ref 3.8–10.5)
WBC # BLD: 5.07 K/UL — SIGNIFICANT CHANGE UP (ref 3.8–10.5)
WBC # FLD AUTO: 5.07 K/UL — SIGNIFICANT CHANGE UP (ref 3.8–10.5)
WBC # FLD AUTO: 5.07 K/UL — SIGNIFICANT CHANGE UP (ref 3.8–10.5)
WBC UR QL: 3 /HPF — SIGNIFICANT CHANGE UP (ref 0–5)
WBC UR QL: 3 /HPF — SIGNIFICANT CHANGE UP (ref 0–5)

## 2023-12-23 PROCEDURE — 99223 1ST HOSP IP/OBS HIGH 75: CPT

## 2023-12-23 PROCEDURE — 71046 X-RAY EXAM CHEST 2 VIEWS: CPT | Mod: 26

## 2023-12-23 PROCEDURE — 72146 MRI CHEST SPINE W/O DYE: CPT | Mod: 26,MG

## 2023-12-23 PROCEDURE — 72141 MRI NECK SPINE W/O DYE: CPT | Mod: 26,MG

## 2023-12-23 PROCEDURE — G1004: CPT

## 2023-12-23 RX ORDER — ACETAMINOPHEN 500 MG
975 TABLET ORAL ONCE
Refills: 0 | Status: COMPLETED | OUTPATIENT
Start: 2023-12-23 | End: 2023-12-23

## 2023-12-23 RX ORDER — KETOROLAC TROMETHAMINE 30 MG/ML
15 SYRINGE (ML) INJECTION ONCE
Refills: 0 | Status: DISCONTINUED | OUTPATIENT
Start: 2023-12-23 | End: 2023-12-23

## 2023-12-23 RX ORDER — CYCLOBENZAPRINE HYDROCHLORIDE 10 MG/1
10 TABLET, FILM COATED ORAL ONCE
Refills: 0 | Status: COMPLETED | OUTPATIENT
Start: 2023-12-23 | End: 2023-12-23

## 2023-12-23 RX ORDER — DIAZEPAM 5 MG
5 TABLET ORAL ONCE
Refills: 0 | Status: DISCONTINUED | OUTPATIENT
Start: 2023-12-23 | End: 2023-12-23

## 2023-12-23 RX ORDER — LIDOCAINE 4 G/100G
1 CREAM TOPICAL ONCE
Refills: 0 | Status: COMPLETED | OUTPATIENT
Start: 2023-12-23 | End: 2023-12-23

## 2023-12-23 RX ORDER — MORPHINE SULFATE 50 MG/1
4 CAPSULE, EXTENDED RELEASE ORAL ONCE
Refills: 0 | Status: DISCONTINUED | OUTPATIENT
Start: 2023-12-23 | End: 2023-12-23

## 2023-12-23 RX ADMIN — Medication 975 MILLIGRAM(S): at 18:46

## 2023-12-23 RX ADMIN — LIDOCAINE 1 PATCH: 4 CREAM TOPICAL at 20:58

## 2023-12-23 RX ADMIN — Medication 5 MILLIGRAM(S): at 18:46

## 2023-12-23 RX ADMIN — Medication 15 MILLIGRAM(S): at 18:46

## 2023-12-23 RX ADMIN — MORPHINE SULFATE 4 MILLIGRAM(S): 50 CAPSULE, EXTENDED RELEASE ORAL at 20:03

## 2023-12-23 RX ADMIN — CYCLOBENZAPRINE HYDROCHLORIDE 10 MILLIGRAM(S): 10 TABLET, FILM COATED ORAL at 21:12

## 2023-12-23 NOTE — ED PROVIDER NOTE - CONSTITUTIONAL, MLM
Nontoxic appearing, awake, alert, oriented to person, place, time/situation and in moderate distress. normal...

## 2023-12-23 NOTE — ED ADULT TRIAGE NOTE - CHIEF COMPLAINT QUOTE
Pt arrives ambulatory to triage c/o chest and back pain x2 days. Denies known injury. RR even and unlabored. PMHx: MVP.

## 2023-12-23 NOTE — ED PROVIDER NOTE - MUSCULOSKELETAL, MLM
Spine appears normal, no midline ttp or deformity, no stepoffs (see thoracic exam), range of motion is not limited, no muscle or joint tenderness Spine appears normal, no midline ttp or deformity, no stepoffs (see thoracic exam), range of motion is not limited, no muscle or joint tenderness, b/l ue 4/5 strength /flexion/extension suspect limited due to effort given exacerbation of pain, 5/5 dorsiflexion/plantarflexion b/l le, able to stand and transfer in bed with pain, +SLR b/l at 30 degrees

## 2023-12-23 NOTE — ED PROVIDER NOTE - ATTENDING APP SHARED VISIT CONTRIBUTION OF CARE
I have personally performed a face to face medical and diagnostic evaluation of the patient. I have discussed with and reviewed the ANDRIY's note and agree with the History, ROS, Physical Exam and MDM unless otherwise indicated. A brief summary of my personal evaluation and impression can be found below.    Abbi MAX: 43-year-old female history of cervical fusion presents with a chief complaint of left thoracic and left lower lateral thoracic back pain over the last 2 days pain is not typical for her normal back pain, she can move everything and for everything but notes changing body positions exacerbates her pain cannot cannot recall a traumatic injury or inciting event.  Patient also reports having episode of chest discomfort earlier today unsure what she was doing at the time it is self resolved no shortness of breath no new lower extremity swelling or edema no abdominal pain no nausea vomiting no bowel or bladder retention or incontinence no urinary or bowel complaints otherwise no rashes. Denies numbness, tingling or loss of sensation. Denies loss of motor function. Also notes pain with deep inspiration Pt also reports she is current on her menses, so having some blood in urine, no other urinary complaints, no vaginal discharge, or rashes.     All other ROS negative, except as above and as per HPI and ROS section.    VITALS: Initial triage and subsequent vitals have been reviewed by me.  GEN APPEARANCE: Alert, non-toxic, well-appearing, NAD.  HEAD: Atraumatic.  EYES: PERRLa, EOMI, vision grossly intact.   NECK: Supple  CV: RRR, S1S2, no c/r/m/g. Cap refill <2 seconds. No bruits.   LUNGS: CTAB. No abnormal breath sounds.  ABDOMEN: Soft, NTND. No guarding or rebound.   MSK/EXT: +2 radial/DP LUE/LLE. No spinal or extremity point tenderness. No CVA ttp. Pelvis stable. No peripheral edema.  NEURO: Alert, follows commands. Weight bearing normal. Speech normal. Sensation and motor normal x4 extremities.   SKIN: Warm, dry and intact. No rash.  PSYCH: Appropriate    Plan/MDM: exam vss non toxic PE as above no rash ddx c/f likely msk repeated pains consider lower cervical radiculopathy pain, have less c/f dissection as neuro intact, does not appear c/w pe abdomen is soft and ntnd, vitals reassuring, will check labs ekg cxr d dimer urine, give meds as needed, reassess. I have personally performed a face to face medical and diagnostic evaluation of the patient. I have discussed with and reviewed the ANDRIY's note and agree with the History, ROS, Physical Exam and MDM unless otherwise indicated. A brief summary of my personal evaluation and impression can be found below.    Abbi MAX: 43-year-old female history of cervical fusion presents with a chief complaint of left thoracic and left lower lateral thoracic back pain over the last 2 days pain is not typical for her normal back pain, she can move everything and for everything but notes changing body positions exacerbates her pain cannot cannot recall a traumatic injury or inciting event.  Patient also reports having episode of chest discomfort earlier today unsure what she was doing at the time it is self resolved no shortness of breath no new lower extremity swelling or edema no abdominal pain no nausea vomiting no bowel or bladder retention or incontinence no urinary or bowel complaints otherwise no rashes. Denies numbness, tingling or loss of sensation. Denies loss of motor function. Also notes pain with deep inspiration Pt also reports she is current on her menses, so having some blood in urine, no other urinary complaints, no vaginal discharge, or rashes.     All other ROS negative, except as above and as per HPI and ROS section.    VITALS: Initial triage and subsequent vitals have been reviewed by me.  GEN APPEARANCE: Alert, non-toxic, well-appearing, NAD.  HEAD: Atraumatic.  EYES: PERRLa, EOMI, vision grossly intact.   NECK: Supple  CV: RRR, S1S2, no c/r/m/g. Cap refill <2 seconds. No bruits.   LUNGS: CTAB. No abnormal breath sounds.  ABDOMEN: Soft, NTND. No guarding or rebound.   MSK/EXT: +2 radial/DP LUE/LLE. No spinal or extremity point tenderness. No CVA ttp. Pelvis stable. No peripheral edema.  NEURO: Alert, follows commands. Weight bearing normal. Speech normal. Sensation and motor normal x4 extremities. UE/LE 4/5 b/l   SKIN: Warm, dry and intact. No rash.  PSYCH: Appropriate    Plan/MDM: exam vss non toxic PE as above no rash ddx c/f likely msk repeated pains consider lower cervical radiculopathy pain, have less c/f dissection as neuro intact, does not appear c/w pe abdomen is soft and ntnd, vitals reassuring, will check labs ekg cxr d dimer urine, give meds as needed, reassess.

## 2023-12-23 NOTE — ED PROVIDER NOTE - NSICDXPASTSURGICALHX_GEN_ALL_CORE_FT
PAST SURGICAL HISTORY:  History of laminectomy 2013    History of tonsillectomy and adenoidectomy     History of umbilical hernia repair

## 2023-12-23 NOTE — ED CDU PROVIDER INITIAL DAY NOTE - NS ED ATTENDING STATEMENT MOD
This was a shared visit with the ANDRIY. I reviewed and verified the documentation and independently performed the documented:

## 2023-12-23 NOTE — ED PROVIDER NOTE - RESPIRATORY CHEST TENDERNESS
ttp at bra line, posteriorly below scapula, wrapping around to lateral thoracic ribs, no anterior rib ttp, no rash/LEFT POSTERIOR/LEFT LATERAL THORACIC

## 2023-12-23 NOTE — ED CDU PROVIDER INITIAL DAY NOTE - CLINICAL SUMMARY MEDICAL DECISION MAKING FREE TEXT BOX
Pt with acute atraumatic lt thoracic pain, in ed received multiple rounds of pain control with limited relief, on reassessment pt c/o paresthesias down lue; pt sent to CD for pain control and MRI c/t spine

## 2023-12-23 NOTE — ED PROVIDER NOTE - CARDIAC, MLM
Normal rate, regular rhythm.  Heart sounds S1, S2.  No murmurs, rubs or gallops. No anterior chest well ttp

## 2023-12-23 NOTE — ED PROVIDER NOTE - NSICDXFAMILYHX_GEN_ALL_CORE_FT
FAMILY HISTORY:  Family history of heart disease, (mother)    Sibling  Still living? Unknown  Family history of asthma, Age at diagnosis: Age Unknown

## 2023-12-23 NOTE — ED ADULT NURSE NOTE - OBJECTIVE STATEMENT
Patient received in stretcher. AOX4. Respirations even and unlabored. Spontaneous movement of all extremities noted. Presents to ER c/o mid back pain worse on movement. Patient reports pain for days unresolved. Denies numbness. tingling. IV placed Labs drawn. Medicated as per MD orders. Comfort and safety maintained. All current care needs met. Care plan continued Jose PRECIADO

## 2023-12-23 NOTE — ED PROVIDER NOTE - CLINICAL SUMMARY MEDICAL DECISION MAKING FREE TEXT BOX
pt with lt thoracic back pain and episode of separate cp today which has since resolved. will r/o pe vs acs vs pneumothorax vs msk etiology vs infectious etiology; no neuro deficits will check cbc, cmp, ddimer, trop, cxr, ua/ucx, pain control

## 2023-12-23 NOTE — ED PROVIDER NOTE - NSICDXPASTMEDICALHX_GEN_ALL_CORE_FT
PAST MEDICAL HISTORY:  Acute anal fissure     Carpal Tunnel Syndrome B/L    Chest pain chronic c/o chest pain, followed by Dr. Perez. Recent cardiac workup 2017 negative.    Chronic back pain     Herniated Cervical Disc     MVP (Mitral Valve Prolapse)     Umbilical Hernia

## 2023-12-23 NOTE — ED PROVIDER NOTE - PROGRESS NOTE DETAILS
Abbi MAX: Pt assessed at beside. Pt resting comfortably, pain controlled, pt questions answered. Vital signs stable pt still w sig pain, offered cdu of which she accepts.

## 2023-12-23 NOTE — ED CDU PROVIDER INITIAL DAY NOTE - ATTENDING APP SHARED VISIT CONTRIBUTION OF CARE
I have personally performed a face to face medical and diagnostic evaluation of the patient. I have discussed with and reviewed the ANDRIY's note and agree with the History, ROS, Physical Exam and MDM unless otherwise indicated. A brief summary of my personal evaluation and impression can be found below.    Abbi MAX:  Please see initial ED provider note for further details.

## 2023-12-23 NOTE — ED PROVIDER NOTE - NSTIMEPROVIDERCAREINITIATE_GEN_ER
Problem: Depressive Behavior With or Without Suicide Precautions:  Goal: Able to verbalize acceptance of life and situations over which he or she has no control  Description: Able to verbalize acceptance of life and situations over which he or she has no control  1/13/2022 0829 by Poornima Mccabe RN  Outcome: Ongoing  1/12/2022 2200 by Grey Cyr RN  Outcome: Ongoing     Problem: Depressive Behavior With or Without Suicide Precautions:  Goal: Able to verbalize support systems  Description: Able to verbalize support systems  Outcome: Ongoing   Pt is aggressive verbally this morning and is attempting nurse splitting. He was asked to go to his room as his behavior was very upsetting to the other patients. He then yelled \"Im not going to my fu---g room\" it took two nurses to calm the patient down. He was originally upset because his breakfast was wrong. He calmed down and apologized however his nagging verbal assault then restarted when told he was not getting injection as order was d/c. He states he is pain but is observed doing a kick boxing move multiple times, and riding the exercise bike without obvious signs and symptoms of pain. He is intrusive and upsetting to other patients and is conning the other patients out of their belongings by continually nagging them until they relent. Even after being redirected away from patients he finds a time to get them alone. He denies SI/HI/AVH and is not responding to internal stimuli. He is quick cycled with mood and demeanor and flips on a dime. 23-Dec-2023 17:53

## 2023-12-23 NOTE — ED ADULT NURSE NOTE - NSFALLUNIVINTERV_ED_ALL_ED
Bed/Stretcher in lowest position, wheels locked, appropriate side rails in place/Call bell, personal items and telephone in reach/Instruct patient to call for assistance before getting out of bed/chair/stretcher/Non-slip footwear applied when patient is off stretcher/[place] to call system/Physically safe environment - no spills, clutter or unnecessary equipment/Purposeful proactive rounding/Room/bathroom lighting operational, light cord in reach Bed/Stretcher in lowest position, wheels locked, appropriate side rails in place/Call bell, personal items and telephone in reach/Instruct patient to call for assistance before getting out of bed/chair/stretcher/Non-slip footwear applied when patient is off stretcher/[place] to call system/Physically safe environment - no spills, clutter or unnecessary equipment/Purposeful proactive rounding/Room/bathroom lighting operational, light cord in reach

## 2023-12-23 NOTE — ED PROVIDER NOTE - OBJECTIVE STATEMENT
44 y/o F PMH neck/back pain with h/o cervical fusion c/o left thoracic and lateral thoracic back pain x 2 days. Pt states this is not a typical pain for her back pain. Pain started while walking into her bedroom, worsens with an movement of thorax. Also admits to an episode of anterior CP today, pt unsure what she was doing at the time, states it self resolved. Pt took 500mg naproxen and 10mg flexeril early this am without relief. Denies fever, chills, SOB, cough, abdominal pain, N/V, numbness/tingling/weakness, bladder/bowel dysfunction, trauma, recent travel/sx, recent illness.

## 2023-12-23 NOTE — ED CDU PROVIDER INITIAL DAY NOTE - OBJECTIVE STATEMENT
42 y/o F PMH neck/back pain with h/o cervical fusion c/o left thoracic and lateral thoracic back pain x 2 days. Pt states this is not a typical pain for her back pain. Pain started while walking into her bedroom, worsens with an movement of thorax. Also admits to an episode of anterior CP today, pt unsure what she was doing at the time, states it self resolved. Pt took 500mg naproxen and 10mg flexeril early this am without relief. Denies fever, chills, SOB, cough, abdominal pain, N/V, numbness/tingling/weakness, bladder/bowel dysfunction, trauma, recent travel/sx, recent illness.

## 2023-12-24 DIAGNOSIS — M54.9 DORSALGIA, UNSPECIFIED: ICD-10-CM

## 2023-12-24 LAB
CULTURE RESULTS: SIGNIFICANT CHANGE UP
CULTURE RESULTS: SIGNIFICANT CHANGE UP
SPECIMEN SOURCE: SIGNIFICANT CHANGE UP
SPECIMEN SOURCE: SIGNIFICANT CHANGE UP

## 2023-12-24 PROCEDURE — 99418 PROLNG IP/OBS E/M EA 15 MIN: CPT

## 2023-12-24 PROCEDURE — 99233 SBSQ HOSP IP/OBS HIGH 50: CPT

## 2023-12-24 PROCEDURE — G0316 PROLONG INPT EVAL ADD15 M: CPT

## 2023-12-24 PROCEDURE — 74176 CT ABD & PELVIS W/O CONTRAST: CPT | Mod: 26,MA

## 2023-12-24 PROCEDURE — 99223 1ST HOSP IP/OBS HIGH 75: CPT

## 2023-12-24 RX ORDER — CYCLOBENZAPRINE HYDROCHLORIDE 10 MG/1
1 TABLET, FILM COATED ORAL
Qty: 0 | Refills: 0 | DISCHARGE

## 2023-12-24 RX ORDER — HEPARIN SODIUM 5000 [USP'U]/ML
5000 INJECTION INTRAVENOUS; SUBCUTANEOUS EVERY 8 HOURS
Refills: 0 | Status: DISCONTINUED | OUTPATIENT
Start: 2023-12-24 | End: 2023-12-27

## 2023-12-24 RX ORDER — KETOROLAC TROMETHAMINE 30 MG/ML
15 SYRINGE (ML) INJECTION EVERY 6 HOURS
Refills: 0 | Status: DISCONTINUED | OUTPATIENT
Start: 2023-12-24 | End: 2023-12-24

## 2023-12-24 RX ORDER — ACETAMINOPHEN 500 MG
650 TABLET ORAL EVERY 6 HOURS
Refills: 0 | Status: DISCONTINUED | OUTPATIENT
Start: 2023-12-24 | End: 2023-12-26

## 2023-12-24 RX ORDER — LANOLIN ALCOHOL/MO/W.PET/CERES
3 CREAM (GRAM) TOPICAL AT BEDTIME
Refills: 0 | Status: DISCONTINUED | OUTPATIENT
Start: 2023-12-24 | End: 2023-12-27

## 2023-12-24 RX ORDER — SODIUM CHLORIDE 9 MG/ML
1000 INJECTION INTRAMUSCULAR; INTRAVENOUS; SUBCUTANEOUS
Refills: 0 | Status: COMPLETED | OUTPATIENT
Start: 2023-12-24 | End: 2023-12-24

## 2023-12-24 RX ORDER — ONDANSETRON 8 MG/1
4 TABLET, FILM COATED ORAL ONCE
Refills: 0 | Status: DISCONTINUED | OUTPATIENT
Start: 2023-12-24 | End: 2023-12-27

## 2023-12-24 RX ORDER — NITROGLYCERIN 6.5 MG
1 CAPSULE, EXTENDED RELEASE ORAL
Qty: 0 | Refills: 0 | DISCHARGE

## 2023-12-24 RX ORDER — LIDOCAINE 4 G/100G
1 CREAM TOPICAL DAILY
Refills: 0 | Status: DISCONTINUED | OUTPATIENT
Start: 2023-12-24 | End: 2023-12-27

## 2023-12-24 RX ORDER — HYDROMORPHONE HYDROCHLORIDE 2 MG/ML
0.5 INJECTION INTRAMUSCULAR; INTRAVENOUS; SUBCUTANEOUS EVERY 6 HOURS
Refills: 0 | Status: DISCONTINUED | OUTPATIENT
Start: 2023-12-24 | End: 2023-12-26

## 2023-12-24 RX ORDER — OXYCODONE HYDROCHLORIDE 5 MG/1
5 TABLET ORAL EVERY 4 HOURS
Refills: 0 | Status: DISCONTINUED | OUTPATIENT
Start: 2023-12-24 | End: 2023-12-26

## 2023-12-24 RX ORDER — MORPHINE SULFATE 50 MG/1
4 CAPSULE, EXTENDED RELEASE ORAL ONCE
Refills: 0 | Status: DISCONTINUED | OUTPATIENT
Start: 2023-12-24 | End: 2023-12-24

## 2023-12-24 RX ORDER — CELECOXIB 200 MG/1
1 CAPSULE ORAL
Qty: 0 | Refills: 0 | DISCHARGE

## 2023-12-24 RX ORDER — OXYCODONE AND ACETAMINOPHEN 5; 325 MG/1; MG/1
1 TABLET ORAL EVERY 6 HOURS
Refills: 0 | Status: DISCONTINUED | OUTPATIENT
Start: 2023-12-24 | End: 2023-12-24

## 2023-12-24 RX ORDER — CYCLOBENZAPRINE HYDROCHLORIDE 10 MG/1
10 TABLET, FILM COATED ORAL THREE TIMES A DAY
Refills: 0 | Status: DISCONTINUED | OUTPATIENT
Start: 2023-12-24 | End: 2023-12-26

## 2023-12-24 RX ADMIN — OXYCODONE AND ACETAMINOPHEN 1 TABLET(S): 5; 325 TABLET ORAL at 02:29

## 2023-12-24 RX ADMIN — Medication 15 MILLIGRAM(S): at 08:11

## 2023-12-24 RX ADMIN — SODIUM CHLORIDE 100 MILLILITER(S): 9 INJECTION INTRAMUSCULAR; INTRAVENOUS; SUBCUTANEOUS at 18:15

## 2023-12-24 RX ADMIN — HYDROMORPHONE HYDROCHLORIDE 0.5 MILLIGRAM(S): 2 INJECTION INTRAMUSCULAR; INTRAVENOUS; SUBCUTANEOUS at 22:43

## 2023-12-24 RX ADMIN — Medication 15 MILLIGRAM(S): at 07:41

## 2023-12-24 RX ADMIN — MORPHINE SULFATE 4 MILLIGRAM(S): 50 CAPSULE, EXTENDED RELEASE ORAL at 15:01

## 2023-12-24 RX ADMIN — MORPHINE SULFATE 4 MILLIGRAM(S): 50 CAPSULE, EXTENDED RELEASE ORAL at 15:31

## 2023-12-24 RX ADMIN — LIDOCAINE 1 PATCH: 4 CREAM TOPICAL at 08:58

## 2023-12-24 RX ADMIN — LIDOCAINE 1 PATCH: 4 CREAM TOPICAL at 22:43

## 2023-12-24 RX ADMIN — OXYCODONE AND ACETAMINOPHEN 1 TABLET(S): 5; 325 TABLET ORAL at 11:05

## 2023-12-24 RX ADMIN — OXYCODONE AND ACETAMINOPHEN 1 TABLET(S): 5; 325 TABLET ORAL at 10:35

## 2023-12-24 NOTE — ED CDU PROVIDER SUBSEQUENT DAY NOTE - HISTORY
Patient is a 43-year-old female with past medical history of cervical fusion presents with back pain x 2 days.  Patient reports while walking, she developed left-sided back and left lateral thoracic pain which worsens with movement.  Patient also reports episode of anterior chest pain yesterday, nonexertional, nonpleuritic, nonradiating.  Patient reports chest pain resolved on its own.  She denies any fevers, chills, shortness of breath, cough, abdominal pain, dysuria, cloudy urine, numbness, weakness, history of malignancy, illicit drug use.  Patient was placed in the observation unit for pain control and MRI.

## 2023-12-24 NOTE — ED CDU PROVIDER DISPOSITION NOTE - ATTENDING CONTRIBUTION TO CARE
43F h/o cervical fusion.  2 days ago walking in house, developed L lateral thoracic back pain and L side CP.  Dimer neg.  Trop neg.  EKG nonischemic.  Paresthesias to L arm.  Getting MR T-spine and C-spine.  significant pain.  Skin unremarkable.  Pain seems to be more L flank, and pt does have hematuria but is on her period.  Possible kidney stone.  Dimer neg unlikely PE.  Pt went for MRI as well.  Normal distal neuro exam except mildly decreased dorsiflexion R foot which pt says is chronic after previous spinal surgery.  Mild L CVAT, will check CT a/p eval for stone.  If all acceptable can d/c home f/u spine center as outpt.    VS:  unremarkable    GEN - NAD;   well appearing;   A+O x3   HEAD - NC/AT     ENT - PEERL, EOMI, mucous membranes    moist , no discharge      NECK: Neck supple, non-tender without lymphadenopathy, no masses, no JVD  PULM - CTA b/l,  symmetric breath sounds  COR -  normal heart sounds    ABD - , ND, NT, soft,  BACK - (+)L CVA tenderness, nontender spine     EXTREMS - no edema, no deformity, warm and well perfused    SKIN - no rash    or bruising      NEUROLOGIC - alert, face symmetric, speech fluent, sensation nl, motor no focal deficit except R dorsiflexion mild decrease (pt states chronic)

## 2023-12-24 NOTE — ED CDU PROVIDER DISPOSITION NOTE - CLINICAL COURSE
43-year-old female with past medical history of cervical fusion presents with back pain x 2 days.  Patient reports while walking, she developed left-sided back and left lateral thoracic pain which worsens with movement.  Patient also reports episode of anterior chest pain yesterday, nonexertional, nonpleuritic, nonradiating.  Patient reports chest pain resolved on its own.  She denies any fevers, chills, shortness of breath, cough, abdominal pain, dysuria, cloudy urine, numbness, weakness, history of malignancy, illicit drug use.  Patient was placed in the observation unit for pain control and MRI. MRi showing chronic disc bulging at multiple levels given pain appeared lt flank CT Ap was done with no acute findings. Hematuria on UA likely 2/2 pt menstrual cycle. No rash on exam skin appear Within normal range. Despite multiple pain regiment pt's pain remains severe and has not been able to get up and move 2/2 pain. Spoke to hospitalist Dr. Lozoya who accepted pt to medicine bed for pain control.

## 2023-12-24 NOTE — ED CDU PROVIDER SUBSEQUENT DAY NOTE - CLINICAL SUMMARY MEDICAL DECISION MAKING FREE TEXT BOX
Patient is a 43-year-old female with past medical history of cervical fusion presents with back pain x 2 days.  Patient reports while walking, she developed left-sided back and left lateral thoracic pain which worsens with movement.  Patient also reports episode of anterior chest pain yesterday, nonexertional, nonpleuritic, nonradiating.  Patient reports chest pain resolved on its own.  She denies any fevers, chills, shortness of breath, cough, abdominal pain, dysuria, cloudy urine, numbness, weakness, history of malignancy, illicit drug use.  Patient was placed in the observation unit for pain control and MRI.  This is a patient with likely musculoskeletal pain.  In the emergency department, D-dimer was less than 150 and troponin T was less than 6.  Plan to continue pain medications and follow-up MRI results.

## 2023-12-25 DIAGNOSIS — Z98.890 OTHER SPECIFIED POSTPROCEDURAL STATES: Chronic | ICD-10-CM

## 2023-12-25 DIAGNOSIS — Z29.9 ENCOUNTER FOR PROPHYLACTIC MEASURES, UNSPECIFIED: ICD-10-CM

## 2023-12-25 DIAGNOSIS — N17.9 ACUTE KIDNEY FAILURE, UNSPECIFIED: ICD-10-CM

## 2023-12-25 DIAGNOSIS — Z98.1 ARTHRODESIS STATUS: Chronic | ICD-10-CM

## 2023-12-25 DIAGNOSIS — M54.9 DORSALGIA, UNSPECIFIED: ICD-10-CM

## 2023-12-25 LAB
A1C WITH ESTIMATED AVERAGE GLUCOSE RESULT: 5.2 % — SIGNIFICANT CHANGE UP (ref 4–5.6)
A1C WITH ESTIMATED AVERAGE GLUCOSE RESULT: 5.2 % — SIGNIFICANT CHANGE UP (ref 4–5.6)
ALBUMIN SERPL ELPH-MCNC: 3.9 G/DL — SIGNIFICANT CHANGE UP (ref 3.3–5)
ALBUMIN SERPL ELPH-MCNC: 3.9 G/DL — SIGNIFICANT CHANGE UP (ref 3.3–5)
ALP SERPL-CCNC: 45 U/L — SIGNIFICANT CHANGE UP (ref 40–120)
ALP SERPL-CCNC: 45 U/L — SIGNIFICANT CHANGE UP (ref 40–120)
ALT FLD-CCNC: 9 U/L — SIGNIFICANT CHANGE UP (ref 4–33)
ALT FLD-CCNC: 9 U/L — SIGNIFICANT CHANGE UP (ref 4–33)
ANION GAP SERPL CALC-SCNC: 9 MMOL/L — SIGNIFICANT CHANGE UP (ref 7–14)
ANION GAP SERPL CALC-SCNC: 9 MMOL/L — SIGNIFICANT CHANGE UP (ref 7–14)
APTT BLD: 32.4 SEC — SIGNIFICANT CHANGE UP (ref 24.5–35.6)
APTT BLD: 32.4 SEC — SIGNIFICANT CHANGE UP (ref 24.5–35.6)
AST SERPL-CCNC: 20 U/L — SIGNIFICANT CHANGE UP (ref 4–32)
AST SERPL-CCNC: 20 U/L — SIGNIFICANT CHANGE UP (ref 4–32)
BASOPHILS # BLD AUTO: 0.05 K/UL — SIGNIFICANT CHANGE UP (ref 0–0.2)
BASOPHILS # BLD AUTO: 0.05 K/UL — SIGNIFICANT CHANGE UP (ref 0–0.2)
BASOPHILS NFR BLD AUTO: 1.3 % — SIGNIFICANT CHANGE UP (ref 0–2)
BASOPHILS NFR BLD AUTO: 1.3 % — SIGNIFICANT CHANGE UP (ref 0–2)
BILIRUB SERPL-MCNC: 0.2 MG/DL — SIGNIFICANT CHANGE UP (ref 0.2–1.2)
BILIRUB SERPL-MCNC: 0.2 MG/DL — SIGNIFICANT CHANGE UP (ref 0.2–1.2)
BUN SERPL-MCNC: 15 MG/DL — SIGNIFICANT CHANGE UP (ref 7–23)
BUN SERPL-MCNC: 15 MG/DL — SIGNIFICANT CHANGE UP (ref 7–23)
CALCIUM SERPL-MCNC: 8.7 MG/DL — SIGNIFICANT CHANGE UP (ref 8.4–10.5)
CALCIUM SERPL-MCNC: 8.7 MG/DL — SIGNIFICANT CHANGE UP (ref 8.4–10.5)
CHLORIDE SERPL-SCNC: 104 MMOL/L — SIGNIFICANT CHANGE UP (ref 98–107)
CHLORIDE SERPL-SCNC: 104 MMOL/L — SIGNIFICANT CHANGE UP (ref 98–107)
CHOLEST SERPL-MCNC: 166 MG/DL — SIGNIFICANT CHANGE UP
CHOLEST SERPL-MCNC: 166 MG/DL — SIGNIFICANT CHANGE UP
CO2 SERPL-SCNC: 25 MMOL/L — SIGNIFICANT CHANGE UP (ref 22–31)
CO2 SERPL-SCNC: 25 MMOL/L — SIGNIFICANT CHANGE UP (ref 22–31)
CREAT SERPL-MCNC: 1.13 MG/DL — SIGNIFICANT CHANGE UP (ref 0.5–1.3)
CREAT SERPL-MCNC: 1.13 MG/DL — SIGNIFICANT CHANGE UP (ref 0.5–1.3)
EGFR: 61 ML/MIN/1.73M2 — SIGNIFICANT CHANGE UP
EGFR: 61 ML/MIN/1.73M2 — SIGNIFICANT CHANGE UP
EOSINOPHIL # BLD AUTO: 0.24 K/UL — SIGNIFICANT CHANGE UP (ref 0–0.5)
EOSINOPHIL # BLD AUTO: 0.24 K/UL — SIGNIFICANT CHANGE UP (ref 0–0.5)
EOSINOPHIL NFR BLD AUTO: 6.3 % — HIGH (ref 0–6)
EOSINOPHIL NFR BLD AUTO: 6.3 % — HIGH (ref 0–6)
ESTIMATED AVERAGE GLUCOSE: 103 — SIGNIFICANT CHANGE UP
ESTIMATED AVERAGE GLUCOSE: 103 — SIGNIFICANT CHANGE UP
GLUCOSE SERPL-MCNC: 70 MG/DL — SIGNIFICANT CHANGE UP (ref 70–99)
GLUCOSE SERPL-MCNC: 70 MG/DL — SIGNIFICANT CHANGE UP (ref 70–99)
HCT VFR BLD CALC: 40.4 % — SIGNIFICANT CHANGE UP (ref 34.5–45)
HCT VFR BLD CALC: 40.4 % — SIGNIFICANT CHANGE UP (ref 34.5–45)
HDLC SERPL-MCNC: 66 MG/DL — SIGNIFICANT CHANGE UP
HDLC SERPL-MCNC: 66 MG/DL — SIGNIFICANT CHANGE UP
HGB BLD-MCNC: 12.7 G/DL — SIGNIFICANT CHANGE UP (ref 11.5–15.5)
HGB BLD-MCNC: 12.7 G/DL — SIGNIFICANT CHANGE UP (ref 11.5–15.5)
IANC: 1.01 K/UL — LOW (ref 1.8–7.4)
IANC: 1.01 K/UL — LOW (ref 1.8–7.4)
IMM GRANULOCYTES NFR BLD AUTO: 0.3 % — SIGNIFICANT CHANGE UP (ref 0–0.9)
IMM GRANULOCYTES NFR BLD AUTO: 0.3 % — SIGNIFICANT CHANGE UP (ref 0–0.9)
INR BLD: 1.05 RATIO — SIGNIFICANT CHANGE UP (ref 0.85–1.18)
INR BLD: 1.05 RATIO — SIGNIFICANT CHANGE UP (ref 0.85–1.18)
LIPID PNL WITH DIRECT LDL SERPL: 89 MG/DL — SIGNIFICANT CHANGE UP
LIPID PNL WITH DIRECT LDL SERPL: 89 MG/DL — SIGNIFICANT CHANGE UP
LYMPHOCYTES # BLD AUTO: 2.28 K/UL — SIGNIFICANT CHANGE UP (ref 1–3.3)
LYMPHOCYTES # BLD AUTO: 2.28 K/UL — SIGNIFICANT CHANGE UP (ref 1–3.3)
LYMPHOCYTES # BLD AUTO: 59.4 % — HIGH (ref 13–44)
LYMPHOCYTES # BLD AUTO: 59.4 % — HIGH (ref 13–44)
MCHC RBC-ENTMCNC: 28.9 PG — SIGNIFICANT CHANGE UP (ref 27–34)
MCHC RBC-ENTMCNC: 28.9 PG — SIGNIFICANT CHANGE UP (ref 27–34)
MCHC RBC-ENTMCNC: 31.4 GM/DL — LOW (ref 32–36)
MCHC RBC-ENTMCNC: 31.4 GM/DL — LOW (ref 32–36)
MCV RBC AUTO: 91.8 FL — SIGNIFICANT CHANGE UP (ref 80–100)
MCV RBC AUTO: 91.8 FL — SIGNIFICANT CHANGE UP (ref 80–100)
MONOCYTES # BLD AUTO: 0.25 K/UL — SIGNIFICANT CHANGE UP (ref 0–0.9)
MONOCYTES # BLD AUTO: 0.25 K/UL — SIGNIFICANT CHANGE UP (ref 0–0.9)
MONOCYTES NFR BLD AUTO: 6.5 % — SIGNIFICANT CHANGE UP (ref 2–14)
MONOCYTES NFR BLD AUTO: 6.5 % — SIGNIFICANT CHANGE UP (ref 2–14)
NEUTROPHILS # BLD AUTO: 1.01 K/UL — LOW (ref 1.8–7.4)
NEUTROPHILS # BLD AUTO: 1.01 K/UL — LOW (ref 1.8–7.4)
NEUTROPHILS NFR BLD AUTO: 26.2 % — LOW (ref 43–77)
NEUTROPHILS NFR BLD AUTO: 26.2 % — LOW (ref 43–77)
NON HDL CHOLESTEROL: 100 MG/DL — SIGNIFICANT CHANGE UP
NON HDL CHOLESTEROL: 100 MG/DL — SIGNIFICANT CHANGE UP
NRBC # BLD: 0 /100 WBCS — SIGNIFICANT CHANGE UP (ref 0–0)
NRBC # BLD: 0 /100 WBCS — SIGNIFICANT CHANGE UP (ref 0–0)
NRBC # FLD: 0 K/UL — SIGNIFICANT CHANGE UP (ref 0–0)
NRBC # FLD: 0 K/UL — SIGNIFICANT CHANGE UP (ref 0–0)
PLATELET # BLD AUTO: 250 K/UL — SIGNIFICANT CHANGE UP (ref 150–400)
PLATELET # BLD AUTO: 250 K/UL — SIGNIFICANT CHANGE UP (ref 150–400)
POTASSIUM SERPL-MCNC: 4.1 MMOL/L — SIGNIFICANT CHANGE UP (ref 3.5–5.3)
POTASSIUM SERPL-MCNC: 4.1 MMOL/L — SIGNIFICANT CHANGE UP (ref 3.5–5.3)
POTASSIUM SERPL-SCNC: 4.1 MMOL/L — SIGNIFICANT CHANGE UP (ref 3.5–5.3)
POTASSIUM SERPL-SCNC: 4.1 MMOL/L — SIGNIFICANT CHANGE UP (ref 3.5–5.3)
PROT SERPL-MCNC: 6.8 G/DL — SIGNIFICANT CHANGE UP (ref 6–8.3)
PROT SERPL-MCNC: 6.8 G/DL — SIGNIFICANT CHANGE UP (ref 6–8.3)
PROTHROM AB SERPL-ACNC: 11.9 SEC — SIGNIFICANT CHANGE UP (ref 9.5–13)
PROTHROM AB SERPL-ACNC: 11.9 SEC — SIGNIFICANT CHANGE UP (ref 9.5–13)
RBC # BLD: 4.4 M/UL — SIGNIFICANT CHANGE UP (ref 3.8–5.2)
RBC # BLD: 4.4 M/UL — SIGNIFICANT CHANGE UP (ref 3.8–5.2)
RBC # FLD: 12.6 % — SIGNIFICANT CHANGE UP (ref 10.3–14.5)
RBC # FLD: 12.6 % — SIGNIFICANT CHANGE UP (ref 10.3–14.5)
SODIUM SERPL-SCNC: 138 MMOL/L — SIGNIFICANT CHANGE UP (ref 135–145)
SODIUM SERPL-SCNC: 138 MMOL/L — SIGNIFICANT CHANGE UP (ref 135–145)
TRIGL SERPL-MCNC: 54 MG/DL — SIGNIFICANT CHANGE UP
TRIGL SERPL-MCNC: 54 MG/DL — SIGNIFICANT CHANGE UP
WBC # BLD: 3.84 K/UL — SIGNIFICANT CHANGE UP (ref 3.8–10.5)
WBC # BLD: 3.84 K/UL — SIGNIFICANT CHANGE UP (ref 3.8–10.5)
WBC # FLD AUTO: 3.84 K/UL — SIGNIFICANT CHANGE UP (ref 3.8–10.5)
WBC # FLD AUTO: 3.84 K/UL — SIGNIFICANT CHANGE UP (ref 3.8–10.5)

## 2023-12-25 PROCEDURE — 99232 SBSQ HOSP IP/OBS MODERATE 35: CPT

## 2023-12-25 RX ORDER — POLYETHYLENE GLYCOL 3350 17 G/17G
17 POWDER, FOR SOLUTION ORAL DAILY
Refills: 0 | Status: DISCONTINUED | OUTPATIENT
Start: 2023-12-25 | End: 2023-12-27

## 2023-12-25 RX ORDER — FLUCONAZOLE 150 MG/1
150 TABLET ORAL ONCE
Refills: 0 | Status: COMPLETED | OUTPATIENT
Start: 2023-12-25 | End: 2023-12-27

## 2023-12-25 RX ORDER — SENNA PLUS 8.6 MG/1
2 TABLET ORAL AT BEDTIME
Refills: 0 | Status: DISCONTINUED | OUTPATIENT
Start: 2023-12-25 | End: 2023-12-27

## 2023-12-25 RX ADMIN — LIDOCAINE 1 PATCH: 4 CREAM TOPICAL at 18:00

## 2023-12-25 RX ADMIN — LIDOCAINE 1 PATCH: 4 CREAM TOPICAL at 11:19

## 2023-12-25 RX ADMIN — CYCLOBENZAPRINE HYDROCHLORIDE 10 MILLIGRAM(S): 10 TABLET, FILM COATED ORAL at 13:57

## 2023-12-25 RX ADMIN — HYDROMORPHONE HYDROCHLORIDE 0.5 MILLIGRAM(S): 2 INJECTION INTRAMUSCULAR; INTRAVENOUS; SUBCUTANEOUS at 11:29

## 2023-12-25 RX ADMIN — LIDOCAINE 1 PATCH: 4 CREAM TOPICAL at 08:16

## 2023-12-25 RX ADMIN — Medication 24 MILLIGRAM(S): at 12:23

## 2023-12-25 RX ADMIN — SENNA PLUS 2 TABLET(S): 8.6 TABLET ORAL at 22:17

## 2023-12-25 RX ADMIN — LIDOCAINE 1 PATCH: 4 CREAM TOPICAL at 12:23

## 2023-12-25 RX ADMIN — HYDROMORPHONE HYDROCHLORIDE 0.5 MILLIGRAM(S): 2 INJECTION INTRAMUSCULAR; INTRAVENOUS; SUBCUTANEOUS at 10:29

## 2023-12-25 RX ADMIN — HYDROMORPHONE HYDROCHLORIDE 0.5 MILLIGRAM(S): 2 INJECTION INTRAMUSCULAR; INTRAVENOUS; SUBCUTANEOUS at 18:50

## 2023-12-25 RX ADMIN — LIDOCAINE 1 PATCH: 4 CREAM TOPICAL at 23:50

## 2023-12-25 RX ADMIN — CYCLOBENZAPRINE HYDROCHLORIDE 10 MILLIGRAM(S): 10 TABLET, FILM COATED ORAL at 22:17

## 2023-12-25 RX ADMIN — POLYETHYLENE GLYCOL 3350 17 GRAM(S): 17 POWDER, FOR SOLUTION ORAL at 13:57

## 2023-12-25 NOTE — H&P ADULT - NSHPREVIEWOFSYSTEMS_GEN_ALL_CORE
Review of Systems:   CONSTITUTIONAL: No fever, weight loss  EYES: No eye pain, visual disturbances, or discharge  ENMT:  No difficulty hearing, tinnitus, vertigo; No sinus or throat pain  RESPIRATORY: No SOB. No cough, wheezing, chills or hemoptysis  CARDIOVASCULAR: + chest pain, no palpitations, no dizziness, no leg swelling  GASTROINTESTINAL: No abdominal or epigastric pain. No nausea, vomiting, or hematemesis; No diarrhea or constipation. No melena or hematochezia.  GENITOURINARY: No dysuria, no urinary frequency, +hematuria  NEUROLOGICAL: No headaches, memory loss, loss of strength, numbness, or tremors  SKIN: No itching, burning, rashes, or lesions   ENDOCRINE: No heat or cold intolerance; No hair loss  MUSCULOSKELETAL: No joint pain or swelling; +muscle pain, +back pain  PSYCHIATRIC: No depression, anxiety, mood swings

## 2023-12-25 NOTE — H&P ADULT - NSHPLABSRESULTS_GEN_ALL_CORE
13.2   5.07  )-----------( 270      ( 23 Dec 2023 18:52 )             40.9     139  |  104  |  14  ----------------------------<  112<H>     12  4.2   |  26  |  1.20    Ca    9.1      23 Dec 2023 18:52    TPro  6.7  /  Alb  3.8  /  TBili  0.2  /  DBili  x   /  AST  19  /  ALT  12  /  AlkPhos  50                              Urinalysis Basic - ( 23 Dec 2023 18:40 )  Color: Yellow / Appearance: Clear / S.020 / pH: 7.0  Gluc: Negative mg/dL / Ketone: Trace mg/dL  / Bili: Negative / Urobili: 1.0 mg/dL   Blood: Large / Protein: Negative mg/dL / Nitrite: Negative   Leuk Esterase: Small / RBC: 11 /HPF / WBC 3 /HPF   Sq Epi: x / Non Sq Epi: x / Bacteria: Negative /HPF          Culture - Urine (collected 23 Dec 2023 18:30)  Source: Clean Catch Clean Catch (Midstream)  Final Report (24 Dec 2023 19:39):    <10,000 CFU/mL Normal Urogenital Randi

## 2023-12-25 NOTE — H&P ADULT - NSHPPHYSICALEXAM_GEN_ALL_CORE
Vital Signs Last 24 Hrs  T(C): 37 (25 Dec 2023 01:11), Max: 37 (24 Dec 2023 06:00)  T(F): 98.6 (25 Dec 2023 01:11), Max: 98.6 (24 Dec 2023 06:00)  HR: 56 (25 Dec 2023 01:11) (56 - 66)  BP: 108/68 (25 Dec 2023 01:11) (106/72 - 126/87)  BP(mean): --  RR: 17 (25 Dec 2023 01:11) (17 - 18)  SpO2: 100% (25 Dec 2023 01:11) (95% - 100%)    Parameters below as of 25 Dec 2023 01:11  Patient On (Oxygen Delivery Method): room air        CONSTITUTIONAL: Well-groomed, in no apparent distress  EYES: No conjunctival or scleral injection, non-icteric; PERRLA and symmetric  ENMT: No external nasal lesions; nasal mucosa not inflamed; oral mucosa with moist membranes  NECK: Trachea midline without palpable neck mass; thyroid not enlarged and non-tender  RESPIRATORY: Breathing comfortably;  lungs CTA without wheeze/rhonchi/rales  CARDIOVASCULAR: +S1S2, RRR, no M/G/R; no lower extremity edema bilaterally   CHEST/BREAST: Breasts are symmetric in appearance; no palpable masses or lumps  GASTROINTESTINAL: No palpable masses or tenderness, +BS throughout, no rebound/guarding; no hepatosplenomegaly; no hernia palpated  MUSCULOSKELETAL:  no digital clubbing or cyanosis;  normal strength and tone of extremities, pain along paraspinal muscles on the left thoracic region. No pain to palpation of the spinous processes along the spine. Pain to light palpation to right posterior flank.   SKIN: No rashes or ulcers noted; no subcutaneous nodules or induration palpable  NEUROLOGIC: CN II-XII intact; sensation intact in LEs b/l to light touch  PSYCHIATRIC: A+O x 3; mood and affect appropriate; appropriate insight and judgment

## 2023-12-25 NOTE — PATIENT PROFILE ADULT - DO YOU FEEL LIKE HURTING YOURSELF OR OTHERS?
Daughter Rose, called has question she forgot to ask.  Can her mom stop oxygen on her cpap machine? [phone]  
Yes she can stop. Patient called and informed. 
no

## 2023-12-25 NOTE — H&P ADULT - PROBLEM SELECTOR PLAN 1
-CT abdomen and pelvis wo contrast shows no hydronephrosis/or evidence of urolithiases.   -MR cervical and thoracic spine: hypertrophic change in facet C 6-C7 and C7-T1 with moderate to severe narrowing of right neural foramen and severe narrowing of the left neural foramen. T8-T9 small left sided disc protrusion seen causing effacement of the thecal sac and abuts spinal cord. T9-T10 disc bulge worse on the right.  -pain control with oxycodone for moderate pain and Dilaudid for severe pain, flexeril 10 mg TID for muscle spasms, and lidocaine patch to left mid-back for pain control  -acute pain consult to be called by team in AM to consider steroid injection to lumbar spine   -spine consult to evaluate for MRI findings as above.   able to ambulate, no need for PT eval.

## 2023-12-25 NOTE — CONSULT NOTE ADULT - ASSESSMENT
43F pmhx acdf 2018, lumbar lami 2013 p/w L parasagittal mid-thoracic back pain since Thursday while cooking, now somewhat improved. Worse with movement and inspiration, denies numbness/tingling/bowel/bladder changes. MR T8-9 L disc herniation with minor cord abutment, no signal change. Neurologically intact on exam with L thoracic paraspinal muscle tenderness.   -no acute nsgy intervention, symptoms c/w muscle strain, rec pain control per primary  -outpt f/u dr. Ma 2-4 weeks

## 2023-12-25 NOTE — H&P ADULT - ASSESSMENT
Ms. Barnard is a 43 year old F with history of c-spine fusion, lumbar laminectomy and microdiscectomy who presents with new onset back pain that started 2 days ago.

## 2023-12-25 NOTE — PATIENT PROFILE ADULT - FALL HARM RISK - HARM RISK INTERVENTIONS
Assistance with ambulation/Assistance OOB with selected safe patient handling equipment/Communicate Risk of Fall with Harm to all staff/Discuss with provider need for PT consult/Monitor gait and stability/Provide patient with walking aids - walker, cane, crutches/Reinforce activity limits and safety measures with patient and family/Tailored Fall Risk Interventions/Visual Cue: Yellow wristband and red socks/Bed in lowest position, wheels locked, appropriate side rails in place/Call bell, personal items and telephone in reach/Instruct patient to call for assistance before getting out of bed or chair/Non-slip footwear when patient is out of bed/[place] to call system/Physically safe environment - no spills, clutter or unnecessary equipment/Purposeful Proactive Rounding/Room/bathroom lighting operational, light cord in reach Assistance with ambulation/Assistance OOB with selected safe patient handling equipment/Communicate Risk of Fall with Harm to all staff/Discuss with provider need for PT consult/Monitor gait and stability/Provide patient with walking aids - walker, cane, crutches/Reinforce activity limits and safety measures with patient and family/Tailored Fall Risk Interventions/Visual Cue: Yellow wristband and red socks/Bed in lowest position, wheels locked, appropriate side rails in place/Call bell, personal items and telephone in reach/Instruct patient to call for assistance before getting out of bed or chair/Non-slip footwear when patient is out of bed/[place] to call system/Physically safe environment - no spills, clutter or unnecessary equipment/Purposeful Proactive Rounding/Room/bathroom lighting operational, light cord in reach

## 2023-12-25 NOTE — PROGRESS NOTE ADULT - SUBJECTIVE AND OBJECTIVE BOX
Patient is a 43y old  Female who presents with a chief complaint of back pain control (25 Dec 2023 09:27)      SUBJECTIVE / OVERNIGHT EVENTS:    MEDICATIONS  (STANDING):  heparin   Injectable 5000 Unit(s) SubCutaneous every 8 hours  lidocaine   4% Patch 1 Patch Transdermal daily    MEDICATIONS  (PRN):  acetaminophen     Tablet .. 650 milliGRAM(s) Oral every 6 hours PRN Mild Pain (1 - 3)  cyclobenzaprine 10 milliGRAM(s) Oral three times a day PRN Muscle Spasm  HYDROmorphone  Injectable 0.5 milliGRAM(s) IV Push every 6 hours PRN Severe Pain (7 - 10)  melatonin 3 milliGRAM(s) Oral at bedtime PRN Insomnia  ondansetron Injectable 4 milliGRAM(s) IV Push once PRN Nausea and/or Vomiting  oxyCODONE    IR 5 milliGRAM(s) Oral every 4 hours PRN Moderate Pain (4 - 6)      Vital Signs Last 24 Hrs  T(C): 36.9 (25 Dec 2023 08:01), Max: 37 (25 Dec 2023 01:11)  T(F): 98.4 (25 Dec 2023 08:01), Max: 98.6 (25 Dec 2023 01:11)  HR: 57 (25 Dec 2023 08:01) (56 - 66)  BP: 139/89 (25 Dec 2023 08:01) (106/72 - 139/89)  BP(mean): --  RR: 16 (25 Dec 2023 08:01) (15 - 18)  SpO2: 100% (25 Dec 2023 08:01) (95% - 100%)    Parameters below as of 25 Dec 2023 08:01  Patient On (Oxygen Delivery Method): room air      CAPILLARY BLOOD GLUCOSE        I&O's Summary      PHYSICAL EXAM:  GENERAL: NAD, well-developed  HEAD:  Atraumatic, Normocephalic  EYES: EOMI, PERRLA, conjunctiva and sclera clear  NECK: Supple, No JVD  CHEST/LUNG: Clear to auscultation bilaterally; No wheeze  HEART: Regular rate and rhythm; No murmurs, rubs, or gallops  ABDOMEN: Soft, Nontender, Nondistended; Bowel sounds present  EXTREMITIES:  2+ Peripheral Pulses, No clubbing, cyanosis, or edema  PSYCH: AAOx3  NEUROLOGY: non-focal  SKIN: No rashes or lesions    LABS:                        12.7   3.84  )-----------( 250      ( 25 Dec 2023 06:00 )             40.4     12-25    138  |  104  |  15  ----------------------------<  70  4.1   |  25  |  1.13    Ca    8.7      25 Dec 2023 06:00    TPro  6.8  /  Alb  3.9  /  TBili  0.2  /  DBili  x   /  AST  20  /  ALT  9   /  AlkPhos  45  12-25    PT/INR - ( 25 Dec 2023 06:00 )   PT: 11.9 sec;   INR: 1.05 ratio         PTT - ( 25 Dec 2023 06:00 )  PTT:32.4 sec      Urinalysis Basic - ( 25 Dec 2023 06:00 )    Color: x / Appearance: x / SG: x / pH: x  Gluc: 70 mg/dL / Ketone: x  / Bili: x / Urobili: x   Blood: x / Protein: x / Nitrite: x   Leuk Esterase: x / RBC: x / WBC x   Sq Epi: x / Non Sq Epi: x / Bacteria: x        RADIOLOGY & ADDITIONAL TESTS:    Imaging Personally Reviewed:    Consultant(s) Notes Reviewed:      Care Discussed with Consultants/Other Providers:   Patient is a 43y old  Female who presents with a chief complaint of back pain control (25 Dec 2023 09:27)      SUBJECTIVE / OVERNIGHT EVENTS: patient seen and examined by bedside, pt c/o pain in mid back, also has intermittent numbness and tingling in UE and LE , denies saddle anesthesia and problems with bowel and bladder fn     MEDICATIONS  (STANDING):  heparin   Injectable 5000 Unit(s) SubCutaneous every 8 hours  lidocaine   4% Patch 1 Patch Transdermal daily    MEDICATIONS  (PRN):  acetaminophen     Tablet .. 650 milliGRAM(s) Oral every 6 hours PRN Mild Pain (1 - 3)  cyclobenzaprine 10 milliGRAM(s) Oral three times a day PRN Muscle Spasm  HYDROmorphone  Injectable 0.5 milliGRAM(s) IV Push every 6 hours PRN Severe Pain (7 - 10)  melatonin 3 milliGRAM(s) Oral at bedtime PRN Insomnia  ondansetron Injectable 4 milliGRAM(s) IV Push once PRN Nausea and/or Vomiting  oxyCODONE    IR 5 milliGRAM(s) Oral every 4 hours PRN Moderate Pain (4 - 6)      Vital Signs Last 24 Hrs  T(C): 36.9 (25 Dec 2023 08:01), Max: 37 (25 Dec 2023 01:11)  T(F): 98.4 (25 Dec 2023 08:01), Max: 98.6 (25 Dec 2023 01:11)  HR: 57 (25 Dec 2023 08:01) (56 - 66)  BP: 139/89 (25 Dec 2023 08:01) (106/72 - 139/89)  BP(mean): --  RR: 16 (25 Dec 2023 08:01) (15 - 18)  SpO2: 100% (25 Dec 2023 08:01) (95% - 100%)    Parameters below as of 25 Dec 2023 08:01  Patient On (Oxygen Delivery Method): room air      CAPILLARY BLOOD GLUCOSE        I&O's Summary      PHYSICAL EXAM:  GENERAL: NAD, well-developed  HEAD:  Atraumatic, Normocephalic  EYES: EOMI, PERRLA, conjunctiva and sclera clear  NECK: Supple, No JVD  CHEST/LUNG: Clear to auscultation bilaterally; No wheeze  HEART: Regular rate and rhythm; No murmurs, rubs, or gallops  ABDOMEN: Soft, Nontender, Nondistended; Bowel sounds present  pain on palpation on mid back  left paraspinal region   EXTREMITIES:  2+ Peripheral Pulses, No clubbing, cyanosis, or edema  PSYCH: AAOx3  NEUROLOGY: non-focal  SKIN: No rashes or lesions    LABS:                        12.7   3.84  )-----------( 250      ( 25 Dec 2023 06:00 )             40.4     12-25    138  |  104  |  15  ----------------------------<  70  4.1   |  25  |  1.13    Ca    8.7      25 Dec 2023 06:00    TPro  6.8  /  Alb  3.9  /  TBili  0.2  /  DBili  x   /  AST  20  /  ALT  9   /  AlkPhos  45  12-25    PT/INR - ( 25 Dec 2023 06:00 )   PT: 11.9 sec;   INR: 1.05 ratio         PTT - ( 25 Dec 2023 06:00 )  PTT:32.4 sec      Urinalysis Basic - ( 25 Dec 2023 06:00 )    Color: x / Appearance: x / SG: x / pH: x  Gluc: 70 mg/dL / Ketone: x  / Bili: x / Urobili: x   Blood: x / Protein: x / Nitrite: x   Leuk Esterase: x / RBC: x / WBC x   Sq Epi: x / Non Sq Epi: x / Bacteria: x        RADIOLOGY & ADDITIONAL TESTS:    Imaging Personally Reviewed:    Consultant(s) Notes Reviewed:      Care Discussed with Consultants/Other Providers:

## 2023-12-25 NOTE — H&P ADULT - NSICDXPASTSURGICALHX_GEN_ALL_CORE_FT
PAST SURGICAL HISTORY:  History of arthroplasty     History of laminectomy 2013    History of tonsillectomy and adenoidectomy     History of umbilical hernia repair     S/P lumbar fusion     S/P rotator cuff repair

## 2023-12-25 NOTE — PATIENT PROFILE ADULT - DO YOU FEEL UNSAFE AT HOME, WORK, OR SCHOOL?
76yo female pmh IDDM, HTN, HLD, afib on eliquis p/w right foot pain x 3 months, worsened over the past two weeks. Also c/o worsening numbness of right foot. Recent "osteomyelitis" of right 1st toe, received abx and nail removal 6 weeks ago. Podiatrist recommended neurology follow up, has the appointment in 3 days. Taking Tylenol and percocet for pain with minimal relief. Denies fevers, n/v/d, chest pain, dyspnea, abdominal pain, recent long travel, LE swelling or calf pain,  On lyrica QHS, 74yo female pmh IDDM, HTN, HLD, afib on eliquis p/w right foot pain x 3 months, worsened over the past two weeks. Also c/o worsening numbness of right foot. Recent "osteomyelitis" of right 1st toe, received abx and nail removal 6 weeks ago. Podiatrist recommended neurology follow up, has the appointment in 3 days. Taking Tylenol and percocet for pain with minimal relief. Denies fevers, n/v/d, chest pain, dyspnea, abdominal pain, recent long travel, LE swelling or calf pain. On lyrica 50mg QHS. no

## 2023-12-25 NOTE — PROGRESS NOTE ADULT - PROBLEM SELECTOR PLAN 3
DVT prophylaxis: lovenox 40 mg daily   GI prophylaxis: none   Diet: regular diet DVT prophylaxis: lovenox 40 mg daily   GI prophylaxis: none   Diet: regular diet  plan of care d/w pt and ACP

## 2023-12-25 NOTE — H&P ADULT - HISTORY OF PRESENT ILLNESS
Ms. Barnard is a 43 year old F with history of c-spine fusion, lumbar laminectomy and microdiscectomy who presents with new onset back pain that started 2 days ago. She was placed initially in the CDU for MRI and pain control. She reports the pain is located on her left side of her thoracic region and describes it as sharp and worsens with movement and twisting motion. She also reports pain with inspiration. She also reports the chest pain was non-exertional, was brief and cannot describe the quality and non-radiating. She has previously had her chest pain worked up with stress test, which was normal (also reportedly had ST/T wave inversions at that time). At the initial time she had the pain, she took 500 mg naproxen and flexeril without relief. Denies fever, chills, shortness of breath, cough, nausea and vomiting, numbness and tingling/weakness. CT abdomen and pelvis wo contrast shows no hydronephrosis/or evidence of urolethiasis. MR cervical and thoracic spine: hypertrophic change in facet C 6-C7 and C7-T1 with moderate to severe narrowing of right neural foramen and severe narrowing of the left neural foramen. T8-T9 small left sided disc protrusion seen causing effacement of the thecal sac and abuts spinal cord. T9-T10 disc bulge worse on the right. Labs sig for the following: , EGFR 57, BUN/Cr 14/1.2, UA with trace ketone, small LE, large blood and 11 RBC (currently has menses). Denies dysuria and increased urinary frequency. EKG as interpreted by me shows sinus, HR 83, QTc 430 ms, T wave inversion in III without reciprocal changes  Ms. Barnard is a 43 year old F with history of c-spine fusion, lumbar laminectomy and microdiscectomy who presents with new onset back pain that started 2 days ago. She was placed initially in the CDU for MRI and pain control. She reports the pain is located on her left side of her thoracic region and describes it as sharp and worsens with movement and twisting motion. She also reports pain with inspiration. She also reports the chest pain was non-exertional, was brief and cannot describe the quality and non-radiating. She has previously had her chest pain worked up with stress test, which was normal (also reportedly had ST/T wave inversions at that time). At the initial time she had the pain, she took 500 mg naproxen and flexeril without relief. Denies fever, chills, shortness of breath, cough, nausea and vomiting, numbness and tingling/weakness. CT abdomen and pelvis wo contrast shows no hydronephrosis/or evidence of urolithiases MR cervical and thoracic spine: hypertrophic change in facet C 6-C7 and C7-T1 with moderate to severe narrowing of right neural foramen and severe narrowing of the left neural foramen. T8-T9 small left sided disc protrusion seen causing effacement of the thecal sac and abuts spinal cord. T9-T10 disc bulge worse on the right. Labs sig for the following: , EGFR 57, BUN/Cr 14/1.2, UA with trace ketone, small LE, large blood and 11 RBC (currently has menses). Denies dysuria and increased urinary frequency. EKG as interpreted by me shows sinus, HR 83, QTc 430 ms, T wave inversion in III without reciprocal changes

## 2023-12-25 NOTE — CONSULT NOTE ADULT - SUBJECTIVE AND OBJECTIVE BOX
p ([id number])     HPI:  Ms. Barnard is a 43 year old F with history of c-spine fusion, lumbar laminectomy and microdiscectomy who presents with new onset back pain that started 2 days ago. She was placed initially in the CDU for MRI and pain control. She reports the pain is located on her left side of her thoracic region and describes it as sharp and worsens with movement and twisting motion. She also reports pain with inspiration. She also reports the chest pain was non-exertional, was brief and cannot describe the quality and non-radiating. She has previously had her chest pain worked up with stress test, which was normal (also reportedly had ST/T wave inversions at that time). At the initial time she had the pain, she took 500 mg naproxen and flexeril without relief. Denies fever, chills, shortness of breath, cough, nausea and vomiting, numbness and tingling/weakness. CT abdomen and pelvis wo contrast shows no hydronephrosis/or evidence of urolithiases MR cervical and thoracic spine: hypertrophic change in facet C 6-C7 and C7-T1 with moderate to severe narrowing of right neural foramen and severe narrowing of the left neural foramen. T8-T9 small left sided disc protrusion seen causing effacement of the thecal sac and abuts spinal cord. T9-T10 disc bulge worse on the right.     Neurosurgery consulted for MR findings. Patient denies weakness, numbness,paresthesias, bowel/bladder changes. Pain is worse with movement and inspiration.    Exam: aox3, ROBLEDO 5/5, SILT, reflexes wnl    --Anticoagulation:  heparin   Injectable 5000 Unit(s) SubCutaneous every 8 hours    =====================  PAST MEDICAL HISTORY   MVP (Mitral Valve Prolapse)    Umbilical Hernia    Herniated Cervical Disc    Carpal Tunnel Syndrome    Chronic back pain    Acute anal fissure    Chest pain      PAST SURGICAL HISTORY   Status Post Tonsillectomy    History of laminectomy    History of umbilical hernia repair    History of tonsillectomy and adenoidectomy    S/P lumbar fusion    S/P rotator cuff repair    History of arthroplasty      Soy (Pruritus)  shellfish (Other)  terbutaline (Unknown)  seafood- chest tightness (Unknown)      MEDICATIONS:  Antibiotics:    Neuro:  acetaminophen     Tablet .. 650 milliGRAM(s) Oral every 6 hours PRN  cyclobenzaprine 10 milliGRAM(s) Oral three times a day PRN  HYDROmorphone  Injectable 0.5 milliGRAM(s) IV Push every 6 hours PRN  melatonin 3 milliGRAM(s) Oral at bedtime PRN  ondansetron Injectable 4 milliGRAM(s) IV Push once PRN  oxyCODONE    IR 5 milliGRAM(s) Oral every 4 hours PRN    Other:      SOCIAL HISTORY:   Occupation:   Marital Status:     FAMILY HISTORY:  Family history of asthma (Sibling)    Family history of heart disease        ROS: Negative except per HPI    LABS:  PT/INR - ( 25 Dec 2023 06:00 )   PT: 11.9 sec;   INR: 1.05 ratio         PTT - ( 25 Dec 2023 06:00 )  PTT:32.4 sec                        12.7   3.84  )-----------( 250      ( 25 Dec 2023 06:00 )             40.4     12-25    138  |  104  |  15  ----------------------------<  70  4.1   |  25  |  1.13    Ca    8.7      25 Dec 2023 06:00    TPro  6.8  /  Alb  3.9  /  TBili  0.2  /  DBili  x   /  AST  20  /  ALT  9   /  AlkPhos  45  12-25       p ([id number])     HPI:  Ms. Barnard is a 43 year old F with history of c-spine fusion, lumbar laminectomy and microdiscectomy who presents with new onset back pain that started 2 days ago. She was placed initially in the CDU for MRI and pain control. She reports the pain is located on her left side of her thoracic region and describes it as sharp and worsens with movement and twisting motion. She also reports pain with inspiration. She also reports the chest pain was non-exertional, was brief and cannot describe the quality and non-radiating. She has previously had her chest pain worked up with stress test, which was normal (also reportedly had ST/T wave inversions at that time). At the initial time she had the pain, she took 500 mg naproxen and flexeril without relief. Denies fever, chills, shortness of breath, cough, nausea and vomiting, numbness and tingling/weakness. CT abdomen and pelvis wo contrast shows no hydronephrosis/or evidence of urolithiases MR cervical and thoracic spine: hypertrophic change in facet C 6-C7 and C7-T1 with moderate to severe narrowing of right neural foramen and severe narrowing of the left neural foramen. T8-T9 small left sided disc protrusion seen causing effacement of the thecal sac and abuts spinal cord. T9-T10 disc bulge worse on the right.     Neurosurgery consulted for MR findings. Patient denies weakness, numbness,paresthesias, bowel/bladder changes. Pain is worse with movement and inspiration.    Exam: aox3, ROBLEDO 5/5, SILT, reflexes wnl    --Anticoagulation:  heparin   Injectable 5000 Unit(s) SubCutaneous every 8 hours    =====================  PAST MEDICAL HISTORY   MVP (Mitral Valve Prolapse)    Umbilical Hernia    Herniated Cervical Disc    Carpal Tunnel Syndrome    Chronic back pain    Acute anal fissure    Chest pain      PAST SURGICAL HISTORY   Status Post Tonsillectomy    History of laminectomy    History of umbilical hernia repair    History of tonsillectomy and adenoidectomy    S/P lumbar fusion    S/P rotator cuff repair    History of arthroplasty      Soy (Pruritus)  shellfish (Other)  terbutaline (Unknown)  seafood- chest tightness (Unknown)      MEDICATIONS:  Antibiotics:    Neuro:  acetaminophen     Tablet .. 650 milliGRAM(s) Oral every 6 hours PRN  cyclobenzaprine 10 milliGRAM(s) Oral three times a day PRN  HYDROmorphone  Injectable 0.5 milliGRAM(s) IV Push every 6 hours PRN  melatonin 3 milliGRAM(s) Oral at bedtime PRN  ondansetron Injectable 4 milliGRAM(s) IV Push once PRN  oxyCODONE    IR 5 milliGRAM(s) Oral every 4 hours PRN    Other:      SOCIAL HISTORY:   Occupation:   Marital Status:     FAMILY HISTORY:  Family history of asthma (Sibling)    Family history of heart disease        ROS: Negative except per HPI    LABS:  PT/INR - ( 25 Dec 2023 06:00 )   PT: 11.9 sec;   INR: 1.05 ratio         PTT - ( 25 Dec 2023 06:00 )  PTT:32.4 sec                        12.7   3.84  )-----------( 250      ( 25 Dec 2023 06:00 )             40.4     12-25    138  |  104  |  15  ----------------------------<  70  4.1   |  25  |  1.13    Ca    8.7      25 Dec 2023 06:00    TPro  6.8  /  Alb  3.9  /  TBili  0.2  /  DBili  x   /  AST  20  /  ALT  9   /  AlkPhos  45  12-25

## 2023-12-25 NOTE — PROGRESS NOTE ADULT - PROBLEM SELECTOR PLAN 2
-Cr 1.2 on admission   -UA/urine Na and CT abdomen and pelvis wo contrast without evidence of obstruction/ nephrolithiases   -continue to monitor BMP daily   -avoid nephrotoxins -NSAIDs included -Cr 1.2 on admission -doubt KORI, repeat Cr1.13   -UA/urine Na and CT abdomen and pelvis wo contrast without evidence of obstruction/ nephrolithiases   -continue to monitor BMP daily   -avoid nephrotoxins -NSAIDs included

## 2023-12-25 NOTE — H&P ADULT - PROBLEM SELECTOR PLAN 2
-Cr 1.2 on admission   -UA/urine Na and CT abdomen and pelvis wo contrast without evidence of obstruction/ nephrolithiases   -continue to monitor BMP daily   -avoid nephrotoxins -NSAIDs included

## 2023-12-25 NOTE — PROGRESS NOTE ADULT - PROBLEM SELECTOR PLAN 1
-CT abdomen and pelvis wo contrast shows no hydronephrosis/or evidence of urolithiases.   -MR cervical and thoracic spine: hypertrophic change in facet C 6-C7 and C7-T1 with moderate to severe narrowing of right neural foramen and severe narrowing of the left neural foramen. T8-T9 small left sided disc protrusion seen causing effacement of the thecal sac and abuts spinal cord. T9-T10 disc bulge worse on the right.  -pain control with oxycodone for moderate pain and Dilaudid for severe pain, flexeril 10 mg TID for muscle spasms, and lidocaine patch to left mid-back for pain control  -acute pain consult to be called by team in AM to consider steroid injection to lumbar spine   -spine consult to evaluate for MRI findings as above.   able to ambulate, no need for PT eval. -CT abdomen and pelvis wo contrast shows no hydronephrosis/or evidence of urolithiases.   -MR cervical and thoracic spine: hypertrophic change in facet C 6-C7 and C7-T1 with moderate to severe narrowing of right neural foramen and severe narrowing of the left neural foramen. T8-T9 small left sided disc protrusion seen causing effacement of the thecal sac and abuts spinal cord. T9-T10 disc bulge worse on the right.  -pain control with oxycodone for moderate pain and Dilaudid for severe pain, flexeril 10 mg TID for muscle spasms, and lidocaine patch to left mid-back for pain control  -acute pain consult to be called to consider steroid injection to lumbar spine   -neuro sx eval appreciated, rec pain management   able to ambulate, no need for PT eval.   will try medrol dose pack  ppi while on steroid   bowel regimen

## 2023-12-26 PROCEDURE — 99232 SBSQ HOSP IP/OBS MODERATE 35: CPT

## 2023-12-26 RX ORDER — HYDROMORPHONE HYDROCHLORIDE 2 MG/ML
0.3 INJECTION INTRAMUSCULAR; INTRAVENOUS; SUBCUTANEOUS EVERY 4 HOURS
Refills: 0 | Status: DISCONTINUED | OUTPATIENT
Start: 2023-12-26 | End: 2023-12-27

## 2023-12-26 RX ORDER — OXYCODONE HYDROCHLORIDE 5 MG/1
7.5 TABLET ORAL EVERY 4 HOURS
Refills: 0 | Status: DISCONTINUED | OUTPATIENT
Start: 2023-12-26 | End: 2023-12-27

## 2023-12-26 RX ORDER — TIZANIDINE 4 MG/1
2 TABLET ORAL EVERY 6 HOURS
Refills: 0 | Status: DISCONTINUED | OUTPATIENT
Start: 2023-12-28 | End: 2023-12-27

## 2023-12-26 RX ORDER — TIZANIDINE 4 MG/1
2 TABLET ORAL EVERY 6 HOURS
Refills: 0 | Status: DISCONTINUED | OUTPATIENT
Start: 2023-12-26 | End: 2023-12-27

## 2023-12-26 RX ORDER — OXYCODONE HYDROCHLORIDE 5 MG/1
5 TABLET ORAL EVERY 4 HOURS
Refills: 0 | Status: DISCONTINUED | OUTPATIENT
Start: 2023-12-26 | End: 2023-12-27

## 2023-12-26 RX ORDER — ACETAMINOPHEN 500 MG
650 TABLET ORAL EVERY 6 HOURS
Refills: 0 | Status: DISCONTINUED | OUTPATIENT
Start: 2023-12-26 | End: 2023-12-27

## 2023-12-26 RX ADMIN — LIDOCAINE 1 PATCH: 4 CREAM TOPICAL at 11:58

## 2023-12-26 RX ADMIN — Medication 8 MILLIGRAM(S): at 21:43

## 2023-12-26 RX ADMIN — Medication 4 MILLIGRAM(S): at 18:40

## 2023-12-26 RX ADMIN — HYDROMORPHONE HYDROCHLORIDE 0.5 MILLIGRAM(S): 2 INJECTION INTRAMUSCULAR; INTRAVENOUS; SUBCUTANEOUS at 08:00

## 2023-12-26 RX ADMIN — HYDROMORPHONE HYDROCHLORIDE 0.5 MILLIGRAM(S): 2 INJECTION INTRAMUSCULAR; INTRAVENOUS; SUBCUTANEOUS at 01:00

## 2023-12-26 RX ADMIN — LIDOCAINE 1 PATCH: 4 CREAM TOPICAL at 23:34

## 2023-12-26 RX ADMIN — HYDROMORPHONE HYDROCHLORIDE 0.5 MILLIGRAM(S): 2 INJECTION INTRAMUSCULAR; INTRAVENOUS; SUBCUTANEOUS at 14:02

## 2023-12-26 RX ADMIN — SENNA PLUS 2 TABLET(S): 8.6 TABLET ORAL at 21:42

## 2023-12-26 RX ADMIN — OXYCODONE HYDROCHLORIDE 7.5 MILLIGRAM(S): 5 TABLET ORAL at 22:26

## 2023-12-26 RX ADMIN — Medication 4 MILLIGRAM(S): at 14:02

## 2023-12-26 RX ADMIN — HYDROMORPHONE HYDROCHLORIDE 0.5 MILLIGRAM(S): 2 INJECTION INTRAMUSCULAR; INTRAVENOUS; SUBCUTANEOUS at 07:00

## 2023-12-26 RX ADMIN — HYDROMORPHONE HYDROCHLORIDE 0.5 MILLIGRAM(S): 2 INJECTION INTRAMUSCULAR; INTRAVENOUS; SUBCUTANEOUS at 02:00

## 2023-12-26 RX ADMIN — OXYCODONE HYDROCHLORIDE 7.5 MILLIGRAM(S): 5 TABLET ORAL at 23:26

## 2023-12-26 RX ADMIN — HYDROMORPHONE HYDROCHLORIDE 0.5 MILLIGRAM(S): 2 INJECTION INTRAMUSCULAR; INTRAVENOUS; SUBCUTANEOUS at 15:02

## 2023-12-26 RX ADMIN — Medication 4 MILLIGRAM(S): at 06:59

## 2023-12-26 RX ADMIN — POLYETHYLENE GLYCOL 3350 17 GRAM(S): 17 POWDER, FOR SOLUTION ORAL at 18:40

## 2023-12-26 RX ADMIN — CYCLOBENZAPRINE HYDROCHLORIDE 10 MILLIGRAM(S): 10 TABLET, FILM COATED ORAL at 11:44

## 2023-12-26 RX ADMIN — TIZANIDINE 2 MILLIGRAM(S): 4 TABLET ORAL at 18:40

## 2023-12-26 RX ADMIN — LIDOCAINE 1 PATCH: 4 CREAM TOPICAL at 19:08

## 2023-12-26 NOTE — PROGRESS NOTE ADULT - PROBLEM SELECTOR PLAN 2
-Cr 1.2 on admission -doubt KORI, repeat Cr1.13   -UA/urine Na and CT abdomen and pelvis wo contrast without evidence of obstruction/ nephrolithiases   -continue to monitor BMP daily   -avoid nephrotoxins -NSAIDs included

## 2023-12-26 NOTE — PROGRESS NOTE ADULT - SUBJECTIVE AND OBJECTIVE BOX
Dr. Alla Mcdermott  Pager [phone]    PROGRESS NOTE:     Patient is a 43y old  Female who presents with a chief complaint of back pain control (26 Dec 2023 12:52)      SUBJECTIVE / OVERNIGHT EVENTS: still c/o back pain, slightly improved  ADDITIONAL REVIEW OF SYSTEMS: afebrile , reports hx of cervical and lumbar fusion, has bulging discs on imaging, reports previously she can't tolerate lyrica or neurontin due to AE's    MEDICATIONS  (STANDING):  fluconAZOLE   Tablet 150 milliGRAM(s) Oral once  heparin   Injectable 5000 Unit(s) SubCutaneous every 8 hours  lidocaine   4% Patch 1 Patch Transdermal daily  methylPREDNISolone   Oral   methylPREDNISolone 4 milliGRAM(s) Oral before breakfast  methylPREDNISolone 4 milliGRAM(s) Oral after lunch  methylPREDNISolone 8 milliGRAM(s) Oral at bedtime  methylPREDNISolone 4 milliGRAM(s) Oral after dinner  polyethylene glycol 3350 17 Gram(s) Oral daily  senna 2 Tablet(s) Oral at bedtime    MEDICATIONS  (PRN):  acetaminophen     Tablet .. 650 milliGRAM(s) Oral every 6 hours PRN Mild Pain (1 - 3)  cyclobenzaprine 10 milliGRAM(s) Oral three times a day PRN Muscle Spasm  HYDROmorphone  Injectable 0.5 milliGRAM(s) IV Push every 6 hours PRN Severe Pain (7 - 10)  melatonin 3 milliGRAM(s) Oral at bedtime PRN Insomnia  ondansetron Injectable 4 milliGRAM(s) IV Push once PRN Nausea and/or Vomiting  oxyCODONE    IR 5 milliGRAM(s) Oral every 4 hours PRN Moderate Pain (4 - 6)      CAPILLARY BLOOD GLUCOSE        I&O's Summary      PHYSICAL EXAM:  Vital Signs Last 24 Hrs  T(C): 36.8 (26 Dec 2023 09:48), Max: 37.2 (25 Dec 2023 18:29)  T(F): 98.2 (26 Dec 2023 09:48), Max: 99 (25 Dec 2023 18:29)  HR: 69 (26 Dec 2023 09:48) (60 - 69)  BP: 125/76 (26 Dec 2023 09:48) (118/77 - 142/87)  BP(mean): --  RR: 18 (26 Dec 2023 09:48) (17 - 18)  SpO2: 100% (26 Dec 2023 09:48) (100% - 100%)    Parameters below as of 26 Dec 2023 09:48  Patient On (Oxygen Delivery Method): room air      CONSTITUTIONAL: NAD, well-developed  RESPIRATORY: Normal respiratory effort; lungs are clear to auscultation bilaterally  CARDIOVASCULAR: Regular rate and rhythm, normal S1 and S2, no murmur/rub/gallop; No lower extremity edema; Peripheral pulses are 2+ bilaterally  ABDOMEN: Nontender to palpation, normoactive bowel sounds, no rebound/guarding; No hepatosplenomegaly  MUSCULOSKELETAL: no clubbing or cyanosis of digits; no joint swelling or tenderness to palpation  PSYCH: A+O to person, place, and time; affect appropriate    LABS:                        12.7   3.84  )-----------( 250      ( 25 Dec 2023 06:00 )             40.4     12-25    138  |  104  |  15  ----------------------------<  70  4.1   |  25  |  1.13    Ca    8.7      25 Dec 2023 06:00    TPro  6.8  /  Alb  3.9  /  TBili  0.2  /  DBili  x   /  AST  20  /  ALT  9   /  AlkPhos  45  12-25    PT/INR - ( 25 Dec 2023 06:00 )   PT: 11.9 sec;   INR: 1.05 ratio         PTT - ( 25 Dec 2023 06:00 )  PTT:32.4 sec      Urinalysis Basic - ( 25 Dec 2023 06:00 )    Color: x / Appearance: x / SG: x / pH: x  Gluc: 70 mg/dL / Ketone: x  / Bili: x / Urobili: x   Blood: x / Protein: x / Nitrite: x   Leuk Esterase: x / RBC: x / WBC x   Sq Epi: x / Non Sq Epi: x / Bacteria: x        Culture - Urine (collected 23 Dec 2023 18:30)  Source: Clean Catch Clean Catch (Midstream)  Final Report (24 Dec 2023 19:39):    <10,000 CFU/mL Normal Urogenital Randi        RADIOLOGY & ADDITIONAL TESTS:  Results Reviewed:   Imaging Personally Reviewed:  < from: MR Thoracic Spine No Cont (12.23.23 @ 22:45) >    Cervical spine:    Postoperative changes compatible with discectomies and anterior spinal   fusion is again seen involving the C5-C7 levels. These findings appear   unchanged    Disc desiccation involving C3-4 C4-5 and C7-T1 levels are again seen.    CT 3: Normal    C3-4: Hypertrophic change involving both facet and uncovertebraljoints.   Moderate narrowing of the left neural foramen and severe narrowing of   right neural foramen is again seen.    C4-5: Disc bulge is seen. Hypertrophic change involving both facet and   uncovertebral joints. Moderate narrowing of the left neural foramen and   severe narrowing of the right neural foramen is again seen.    C5-6: Disc bulge is seen. Hypertrophic change involving both facet and   uncovertebral joints. Moderate narrowing of the left neural foramen and   severe narrowing of theright neural foramen    These findings appear unchanged.    C6-7: Hypertrophic changes involving both facet and uncovertebral joints.   Moderate narrowing of the right neural foramen and severe narrowing of   the left neural foramen    C7-T1: Hypertrophic change involving both facet and uncal regions.   Moderate to severe narrowing of the right neural foramen and severe   narrowing of the left neural foramen. These findings appear unchanged    T1-2: Normal    The spinal cord demonstrates normal signal and caliber.    Evaluation of the paraspinal soft tissues appear normal.    Thoracic spine:    The vertebral body height alignment and marrow signal.    This lesion seen involving the T2-3, T3-4, T5-6 T6-7 and T8-9 levels   these findings are secondary to chronic degenerative changes    No abnormal disc herniations or significant central neural foraminal   stenosis seen.    T8-9: Small left-sided disc protrusion is seen. This causes effacement   ventral thecal sac and abuts the spinal cord.    T9-10: Disc bulge is seen which more prominent on the right side. No   significant, as of the spinal canal or either neural foramen    T10-11: Disc bulge is seen without significant compromise of the spinal   canal or either neural foramen    The spinal cord demonstrates normal signal and caliber.    Evaluation of the paraspinal soft tissues appear normal.    IMPRESSION: Postop changes involving the cervical spine is again seen.    Degenerative changes as described above.      < from: CT Abdomen and Pelvis No Cont (12.24.23 @ 09:03) >  No acute abdominopelvic findings. No hydronephrosis or evidence for   urolithiasis.        Electrocardiogram Personally Reviewed:    COORDINATION OF CARE:  Care Discussed with Consultants/Other Providers [Y/N]: neurosurgery team, bulging discs not related to prior cervical or lumbar fusion, outpt f/u Dr. Ma    Prior or Outpatient Records Reviewed [Y/N]:   Dr. Alla Mcdermott  Pager [phone]    PROGRESS NOTE:     Patient is a 43y old  Female who presents with a chief complaint of back pain control (26 Dec 2023 12:52)      SUBJECTIVE / OVERNIGHT EVENTS: still c/o back pain, slightly improved  ADDITIONAL REVIEW OF SYSTEMS: afebrile , reports hx of cervical and lumbar fusion, has bulging discs on imaging, reports previously she can't tolerate lyrica or neurontin due to AE's    MEDICATIONS  (STANDING):  fluconAZOLE   Tablet 150 milliGRAM(s) Oral once  heparin   Injectable 5000 Unit(s) SubCutaneous every 8 hours  lidocaine   4% Patch 1 Patch Transdermal daily  methylPREDNISolone   Oral   methylPREDNISolone 4 milliGRAM(s) Oral before breakfast  methylPREDNISolone 4 milliGRAM(s) Oral after lunch  methylPREDNISolone 8 milliGRAM(s) Oral at bedtime  methylPREDNISolone 4 milliGRAM(s) Oral after dinner  polyethylene glycol 3350 17 Gram(s) Oral daily  senna 2 Tablet(s) Oral at bedtime    MEDICATIONS  (PRN):  acetaminophen     Tablet .. 650 milliGRAM(s) Oral every 6 hours PRN Mild Pain (1 - 3)  cyclobenzaprine 10 milliGRAM(s) Oral three times a day PRN Muscle Spasm  HYDROmorphone  Injectable 0.5 milliGRAM(s) IV Push every 6 hours PRN Severe Pain (7 - 10)  melatonin 3 milliGRAM(s) Oral at bedtime PRN Insomnia  ondansetron Injectable 4 milliGRAM(s) IV Push once PRN Nausea and/or Vomiting  oxyCODONE    IR 5 milliGRAM(s) Oral every 4 hours PRN Moderate Pain (4 - 6)      CAPILLARY BLOOD GLUCOSE        I&O's Summary      PHYSICAL EXAM:  Vital Signs Last 24 Hrs  T(C): 36.8 (26 Dec 2023 09:48), Max: 37.2 (25 Dec 2023 18:29)  T(F): 98.2 (26 Dec 2023 09:48), Max: 99 (25 Dec 2023 18:29)  HR: 69 (26 Dec 2023 09:48) (60 - 69)  BP: 125/76 (26 Dec 2023 09:48) (118/77 - 142/87)  BP(mean): --  RR: 18 (26 Dec 2023 09:48) (17 - 18)  SpO2: 100% (26 Dec 2023 09:48) (100% - 100%)    Parameters below as of 26 Dec 2023 09:48  Patient On (Oxygen Delivery Method): room air      CONSTITUTIONAL: NAD, well-developed  RESPIRATORY: Normal respiratory effort; lungs are clear to auscultation bilaterally  CARDIOVASCULAR: Regular rate and rhythm, normal S1 and S2, no murmur/rub/gallop; No lower extremity edema; Peripheral pulses are 2+ bilaterally  ABDOMEN: Nontender to palpation, normoactive bowel sounds, no rebound/guarding; No hepatosplenomegaly  MUSCULOSKELETAL: no clubbing or cyanosis of digits; no joint swelling or tenderness to palpation  PSYCH: A+O to person, place, and time; affect appropriate    LABS:                        12.7   3.84  )-----------( 250      ( 25 Dec 2023 06:00 )             40.4     12-25    138  |  104  |  15  ----------------------------<  70  4.1   |  25  |  1.13    Ca    8.7      25 Dec 2023 06:00    TPro  6.8  /  Alb  3.9  /  TBili  0.2  /  DBili  x   /  AST  20  /  ALT  9   /  AlkPhos  45  12-25    PT/INR - ( 25 Dec 2023 06:00 )   PT: 11.9 sec;   INR: 1.05 ratio         PTT - ( 25 Dec 2023 06:00 )  PTT:32.4 sec      Urinalysis Basic - ( 25 Dec 2023 06:00 )    Color: x / Appearance: x / SG: x / pH: x  Gluc: 70 mg/dL / Ketone: x  / Bili: x / Urobili: x   Blood: x / Protein: x / Nitrite: x   Leuk Esterase: x / RBC: x / WBC x   Sq Epi: x / Non Sq Epi: x / Bacteria: x        Culture - Urine (collected 23 Dec 2023 18:30)  Source: Clean Catch Clean Catch (Midstream)  Final Report (24 Dec 2023 19:39):    <10,000 CFU/mL Normal Urogenital Randi        RADIOLOGY & ADDITIONAL TESTS:  Results Reviewed:   Imaging Personally Reviewed:  < from: MR Thoracic Spine No Cont (12.23.23 @ 22:45) >    Cervical spine:    Postoperative changes compatible with discectomies and anterior spinal   fusion is again seen involving the C5-C7 levels. These findings appear   unchanged    Disc desiccation involving C3-4 C4-5 and C7-T1 levels are again seen.    CT 3: Normal    C3-4: Hypertrophic change involving both facet and uncovertebraljoints.   Moderate narrowing of the left neural foramen and severe narrowing of   right neural foramen is again seen.    C4-5: Disc bulge is seen. Hypertrophic change involving both facet and   uncovertebral joints. Moderate narrowing of the left neural foramen and   severe narrowing of the right neural foramen is again seen.    C5-6: Disc bulge is seen. Hypertrophic change involving both facet and   uncovertebral joints. Moderate narrowing of the left neural foramen and   severe narrowing of theright neural foramen    These findings appear unchanged.    C6-7: Hypertrophic changes involving both facet and uncovertebral joints.   Moderate narrowing of the right neural foramen and severe narrowing of   the left neural foramen    C7-T1: Hypertrophic change involving both facet and uncal regions.   Moderate to severe narrowing of the right neural foramen and severe   narrowing of the left neural foramen. These findings appear unchanged    T1-2: Normal    The spinal cord demonstrates normal signal and caliber.    Evaluation of the paraspinal soft tissues appear normal.    Thoracic spine:    The vertebral body height alignment and marrow signal.    This lesion seen involving the T2-3, T3-4, T5-6 T6-7 and T8-9 levels   these findings are secondary to chronic degenerative changes    No abnormal disc herniations or significant central neural foraminal   stenosis seen.    T8-9: Small left-sided disc protrusion is seen. This causes effacement   ventral thecal sac and abuts the spinal cord.    T9-10: Disc bulge is seen which more prominent on the right side. No   significant, as of the spinal canal or either neural foramen    T10-11: Disc bulge is seen without significant compromise of the spinal   canal or either neural foramen    The spinal cord demonstrates normal signal and caliber.    Evaluation of the paraspinal soft tissues appear normal.    IMPRESSION: Postop changes involving the cervical spine is again seen.    Degenerative changes as described above.      < from: CT Abdomen and Pelvis No Cont (12.24.23 @ 09:03) >  No acute abdominopelvic findings. No hydronephrosis or evidence for   urolithiasis.        Electrocardiogram Personally Reviewed:    COORDINATION OF CARE:  Care Discussed with Consultants/Other Providers [Y/N]: neurosurgery team, bulging discs not related to prior cervical or lumbar fusion, outpt f/u Dr. Ma    Prior or Outpatient Records Reviewed [Y/N]:

## 2023-12-26 NOTE — PROGRESS NOTE ADULT - PROBLEM SELECTOR PLAN 3
DVT prophylaxis: lovenox 40 mg daily   GI prophylaxis: none   Diet: regular diet  plan of care d/w pt and ACP

## 2023-12-26 NOTE — PROGRESS NOTE ADULT - PROBLEM SELECTOR PLAN 1
-CT abdomen and pelvis wo contrast shows no hydronephrosis/or evidence of urolithiases.   -MR cervical and thoracic spine: hypertrophic change in facet C 6-C7 and C7-T1 with moderate to severe narrowing of right neural foramen and severe narrowing of the left neural foramen. T8-T9 small left sided disc protrusion seen causing effacement of the thecal sac and abuts spinal cord. T9-T10 disc bulge worse on the right.  -acute pain consulted, appreciate recs  -d/w neurosurgery team, no acute surgical intervention, outpt f/u Dr. Ma in 2-4 weeks  -able to ambulate, no need for PT eval.   -started on medrol pack on 12/25, ppi while on steroid   bowel regimen  - dispo: DC plan home pending clinical improvement, pt doesn't feel ready to be discharged home today -CT abdomen and pelvis wo contrast shows no hydronephrosis/or evidence of urolithiases.   -MR cervical and thoracic spine: hypertrophic change in facet C 6-C7 and C7-T1 with moderate to severe narrowing of right neural foramen and severe narrowing of the left neural foramen. T8-T9 small left sided disc protrusion seen causing effacement of the thecal sac and abuts spinal cord. T9-T10 disc bulge worse on the right.  -acute pain consulted, appreciate recs  - pt declined gabapentin or lyrica d/t previous adverse reactions  -d/w neurosurgery team, no acute surgical intervention, outpt f/u Dr. Ma in 2-4 weeks  -able to ambulate, no need for PT eval.   -started on medrol pack on 12/25, ppi while on steroid   bowel regimen  - dispo: DC plan home pending clinical improvement, pt doesn't feel ready to be discharged home today

## 2023-12-26 NOTE — CONSULT NOTE ADULT - SUBJECTIVE AND OBJECTIVE BOX
Chief Complaint:  unrelieved back pain     HPI:  Ms. Barnard is a 43 year old F with history of c-spine fusion, lumbar laminectomy and microdiscectomy who presents with new onset back pain that started 2 days ago. She was placed initially in the CDU for MRI and pain control. She reports the pain is located on her left side of her thoracic region and describes it as sharp and worsens with movement and twisting motion. She also reports pain with inspiration. She also reports the chest pain was non-exertional, was brief and cannot describe the quality and non-radiating. She has previously had her chest pain worked up with stress test, which was normal (also reportedly had ST/T wave inversions at that time). At the initial time she had the pain, she took 500 mg naproxen and flexeril without relief. Denies fever, chills, shortness of breath, cough, nausea and vomiting, numbness and tingling/weakness. CT abdomen and pelvis wo contrast shows no hydronephrosis/or evidence of urolithiases MR cervical and thoracic spine: hypertrophic change in facet C 6-C7 and C7-T1 with moderate to severe narrowing of right neural foramen and severe narrowing of the left neural foramen. T8-T9 small left sided disc protrusion seen causing effacement of the thecal sac and abuts spinal cord. T9-T10 disc bulge worse on the right. Labs sig for the following: , EGFR 57, BUN/Cr 14/1.2, UA with trace ketone, small LE, large blood and 11 RBC (currently has menses). Denies dysuria and increased urinary frequency. EKG as interpreted by me shows sinus, HR 83, QTc 430 ms, T wave inversion in III without reciprocal changes  (25 Dec 2023 01:34)    Patient seen at the bedside, sitting up in bed, accompanied by her .  The patient is complaining of left mid back, persistent pain that is non radiating and aggravated by movement.  Whenever the patient moved, she would get a sharp pain to the affected area, but now the pain has worsened because it started to affect her breathing.  Hence, patient coming to the ER and getting admitted. So far, her breathing and back pain has improved, but she was unsure if it was from the steroids or the pain medications.  Currently, the patient is not going to get any surgery and they think the pain is mostly musculoskeletal.  Patient refuses to take any nerve pain medications because they changed her mood and she develops suicidal thoughts, no plan or action.  The patient also mentioned that at home, she was taking Flexeril and Naproxen which caused her to have abdominal bleeding and a fissure. Explained new pain regiment toa                                                                                                       PAST MEDICAL & SURGICAL HISTORY:  MVP (Mitral Valve Prolapse)  Umbilical Hernia  Herniated Cervical Disc  Carpal Tunnel Syndrome  B/L  Chronic back pain  Acute anal fissure  Chest pain  chronic c/o chest pain, followed by Dr. Perez. Recent cardiac workup 2017 negative.  History of laminectomy  2013  History of umbilical hernia repair  History of tonsillectomy and adenoidectomy  S/P lumbar fusion  S/P rotator cuff repair  History of arthroplasty      FAMILY HISTORY:  Family history of asthma (Sibling)  (brother)  Family history of heart disease  (mother)      Allergies  Soy (Pruritus)  shellfish (Other)  terbutaline (Unknown)  seafood- chest tightness (Unknown)    PAIN MEDICATIONS:  acetaminophen     Tablet .. 650 milliGRAM(s) Oral every 6 hours PRN  cyclobenzaprine 10 milliGRAM(s) Oral three times a day PRN  HYDROmorphone  Injectable 0.5 milliGRAM(s) IV Push every 6 hours PRN  melatonin 3 milliGRAM(s) Oral at bedtime PRN  ondansetron Injectable 4 milliGRAM(s) IV Push once PRN  oxyCODONE    IR 5 milliGRAM(s) Oral every 4 hours PRN    Heme:  heparin   Injectable 5000 Unit(s) SubCutaneous every 8 hours    Antibiotics:  fluconAZOLE   Tablet 150 milliGRAM(s) Oral once    GI:  polyethylene glycol 3350 17 Gram(s) Oral daily  senna 2 Tablet(s) Oral at bedtime    Endocrine:  methylPREDNISolone   Oral   methylPREDNISolone 4 milliGRAM(s) Oral before breakfast  methylPREDNISolone 4 milliGRAM(s) Oral after lunch  methylPREDNISolone 8 milliGRAM(s) Oral at bedtime  methylPREDNISolone 4 milliGRAM(s) Oral after dinner    All Other Medications:  lidocaine   4% Patch 1 Patch Transdermal daily      Vital Signs Last 24 Hrs  T(C): 36.8 (26 Dec 2023 09:48), Max: 37.2 (25 Dec 2023 18:29)  T(F): 98.2 (26 Dec 2023 09:48), Max: 99 (25 Dec 2023 18:29)  HR: 69 (26 Dec 2023 09:48) (60 - 69)  BP: 125/76 (26 Dec 2023 09:48) (118/77 - 142/87)  BP(mean): --  RR: 18 (26 Dec 2023 09:48) (17 - 18)  SpO2: 100% (26 Dec 2023 09:48) (100% - 100%)    Parameters below as of 26 Dec 2023 09:48  Patient On (Oxygen Delivery Method): room air        PAIN SCORE:  varies, worse with movement.    SCALE USED: (1-10 VNRS)           LABS:                          12.7   3.84  )-----------( 250      ( 25 Dec 2023 06:00 )             40.4     12-25    138  |  104  |  15  ----------------------------<  70  4.1   |  25  |  1.13    Ca    8.7      25 Dec 2023 06:00    TPro  6.8  /  Alb  3.9  /  TBili  0.2  /  DBili  x   /  AST  20  /  ALT  9   /  AlkPhos  45  12-25    PT/INR - ( 25 Dec 2023 06:00 )   PT: 11.9 sec;   INR: 1.05 ratio         PTT - ( 25 Dec 2023 06:00 )  PTT:32.4 sec  Urinalysis Basic - ( 25 Dec 2023 06:00 )    Color: x / Appearance: x / SG: x / pH: x  Gluc: 70 mg/dL / Ketone: x  / Bili: x / Urobili: x   Blood: x / Protein: x / Nitrite: x   Leuk Esterase: x / RBC: x / WBC x   Sq Epi: x / Non Sq Epi: x / Bacteria: x    [x ]  [place]  Reviewed no medications found    PHYSICAL EXAM:  GENERAL: Alert & Oriented x 3 in NAD, well-groomed, well-developed, sitting up in bed.      Impression/Plan: Requested by Medicine team to help manage pain.   Recommendations:  NO NSAIDS, patient is prone to bleeding and fissures; NO NERVE PAIN MEDICATIONS, patient does not like how her mood changes and she feels suicidal  -  Consider discontinuing Flexeril and instead ordering Tizanidine 2mg every 6 hours standing x 2 days, then PRN for muscle spasms (Hold for oversedation or SBP <100).  **TO ORDER PLEASE CALL PHARMACY AS MEDICATION IS NONFORMULARY  -  Consider discontinuing current oxycodone and dilaudid orders, instead order:  Oxycodone 5 mg every 4 hours PRN for moderate pain (4-6). Hold for oversedation. Not to be given within 1 hour of any other immediate acting opioid.   Oxycodone 7.5 mg every 4 hours PRN for severe pain (7-10). Hold for oversedation. Not to be given within 1 hour of any other immediate acting opioid.   IV Dilaudid 0.3 mg every 4 hours PRN for severe breakthrough pain (7-10). MUST TAKE PO OXYCODONE FIRST.  TO BE GIVEN ONLY IF PAIN  UNRELIEVED BY PO OXYCODONE. Hold for oversedation. Not to be given within 1 hour of any other immediate acting opioid.   -  Consider ordering ICE PACKS using real ice to affected left mid back area.  20 min on and 20 mins off TID and PRN.   -  Consider changing Acetaminophen order to po Acetaminophen 650mg every 6 hours standing x2 days, then PRN for pain. Do not give within 6 hours of last dose of Acetaminophen.  -  Consider ordering Lidocaine patch.  Apply Lidocaine patch to affected back area.  12 hours on and 12 hours off x 5 days.   -  Recommend maintaining continuous pulse oximetry.  -  Recommend Physical Therapy consult for TENS therapy and strengthening exercises.  -  Recommend follow up with Neurosurgery when discharged.  -  Recommend follow up with Chronic Pain doctor when discharged. If patient does not have a Chronic Pain doctor, may acquire one through patient's personal insurance carrier.  Discussed patient with Chronic Pain Attending on call, Dr. JER Peters,  who agrees with the above recommendations.  No further recommendations at this time, Chronic pain service to sign off. May call Chronic Pain Service if needed.   Thank you.

## 2023-12-27 ENCOUNTER — TRANSCRIPTION ENCOUNTER (OUTPATIENT)
Age: 43
End: 2023-12-27

## 2023-12-27 VITALS
RESPIRATION RATE: 18 BRPM | TEMPERATURE: 99 F | HEART RATE: 58 BPM | SYSTOLIC BLOOD PRESSURE: 115 MMHG | OXYGEN SATURATION: 100 % | DIASTOLIC BLOOD PRESSURE: 80 MMHG

## 2023-12-27 LAB
ANION GAP SERPL CALC-SCNC: 11 MMOL/L — SIGNIFICANT CHANGE UP (ref 7–14)
ANION GAP SERPL CALC-SCNC: 11 MMOL/L — SIGNIFICANT CHANGE UP (ref 7–14)
BASOPHILS # BLD AUTO: 0.04 K/UL — SIGNIFICANT CHANGE UP (ref 0–0.2)
BASOPHILS # BLD AUTO: 0.04 K/UL — SIGNIFICANT CHANGE UP (ref 0–0.2)
BASOPHILS NFR BLD AUTO: 0.7 % — SIGNIFICANT CHANGE UP (ref 0–2)
BASOPHILS NFR BLD AUTO: 0.7 % — SIGNIFICANT CHANGE UP (ref 0–2)
BUN SERPL-MCNC: 15 MG/DL — SIGNIFICANT CHANGE UP (ref 7–23)
BUN SERPL-MCNC: 15 MG/DL — SIGNIFICANT CHANGE UP (ref 7–23)
CALCIUM SERPL-MCNC: 8.8 MG/DL — SIGNIFICANT CHANGE UP (ref 8.4–10.5)
CALCIUM SERPL-MCNC: 8.8 MG/DL — SIGNIFICANT CHANGE UP (ref 8.4–10.5)
CHLORIDE SERPL-SCNC: 103 MMOL/L — SIGNIFICANT CHANGE UP (ref 98–107)
CHLORIDE SERPL-SCNC: 103 MMOL/L — SIGNIFICANT CHANGE UP (ref 98–107)
CO2 SERPL-SCNC: 25 MMOL/L — SIGNIFICANT CHANGE UP (ref 22–31)
CO2 SERPL-SCNC: 25 MMOL/L — SIGNIFICANT CHANGE UP (ref 22–31)
CREAT SERPL-MCNC: 0.98 MG/DL — SIGNIFICANT CHANGE UP (ref 0.5–1.3)
CREAT SERPL-MCNC: 0.98 MG/DL — SIGNIFICANT CHANGE UP (ref 0.5–1.3)
EGFR: 73 ML/MIN/1.73M2 — SIGNIFICANT CHANGE UP
EGFR: 73 ML/MIN/1.73M2 — SIGNIFICANT CHANGE UP
EOSINOPHIL # BLD AUTO: 0.03 K/UL — SIGNIFICANT CHANGE UP (ref 0–0.5)
EOSINOPHIL # BLD AUTO: 0.03 K/UL — SIGNIFICANT CHANGE UP (ref 0–0.5)
EOSINOPHIL NFR BLD AUTO: 0.5 % — SIGNIFICANT CHANGE UP (ref 0–6)
EOSINOPHIL NFR BLD AUTO: 0.5 % — SIGNIFICANT CHANGE UP (ref 0–6)
GLUCOSE SERPL-MCNC: 94 MG/DL — SIGNIFICANT CHANGE UP (ref 70–99)
GLUCOSE SERPL-MCNC: 94 MG/DL — SIGNIFICANT CHANGE UP (ref 70–99)
HCT VFR BLD CALC: 39.7 % — SIGNIFICANT CHANGE UP (ref 34.5–45)
HCT VFR BLD CALC: 39.7 % — SIGNIFICANT CHANGE UP (ref 34.5–45)
HGB BLD-MCNC: 13.3 G/DL — SIGNIFICANT CHANGE UP (ref 11.5–15.5)
HGB BLD-MCNC: 13.3 G/DL — SIGNIFICANT CHANGE UP (ref 11.5–15.5)
IANC: 4.18 K/UL — SIGNIFICANT CHANGE UP (ref 1.8–7.4)
IANC: 4.18 K/UL — SIGNIFICANT CHANGE UP (ref 1.8–7.4)
IMM GRANULOCYTES NFR BLD AUTO: 0 % — SIGNIFICANT CHANGE UP (ref 0–0.9)
IMM GRANULOCYTES NFR BLD AUTO: 0 % — SIGNIFICANT CHANGE UP (ref 0–0.9)
LYMPHOCYTES # BLD AUTO: 1.33 K/UL — SIGNIFICANT CHANGE UP (ref 1–3.3)
LYMPHOCYTES # BLD AUTO: 1.33 K/UL — SIGNIFICANT CHANGE UP (ref 1–3.3)
LYMPHOCYTES # BLD AUTO: 22.6 % — SIGNIFICANT CHANGE UP (ref 13–44)
LYMPHOCYTES # BLD AUTO: 22.6 % — SIGNIFICANT CHANGE UP (ref 13–44)
MAGNESIUM SERPL-MCNC: 2.1 MG/DL — SIGNIFICANT CHANGE UP (ref 1.6–2.6)
MAGNESIUM SERPL-MCNC: 2.1 MG/DL — SIGNIFICANT CHANGE UP (ref 1.6–2.6)
MCHC RBC-ENTMCNC: 29.1 PG — SIGNIFICANT CHANGE UP (ref 27–34)
MCHC RBC-ENTMCNC: 29.1 PG — SIGNIFICANT CHANGE UP (ref 27–34)
MCHC RBC-ENTMCNC: 33.5 GM/DL — SIGNIFICANT CHANGE UP (ref 32–36)
MCHC RBC-ENTMCNC: 33.5 GM/DL — SIGNIFICANT CHANGE UP (ref 32–36)
MCV RBC AUTO: 86.9 FL — SIGNIFICANT CHANGE UP (ref 80–100)
MCV RBC AUTO: 86.9 FL — SIGNIFICANT CHANGE UP (ref 80–100)
MONOCYTES # BLD AUTO: 0.31 K/UL — SIGNIFICANT CHANGE UP (ref 0–0.9)
MONOCYTES # BLD AUTO: 0.31 K/UL — SIGNIFICANT CHANGE UP (ref 0–0.9)
MONOCYTES NFR BLD AUTO: 5.3 % — SIGNIFICANT CHANGE UP (ref 2–14)
MONOCYTES NFR BLD AUTO: 5.3 % — SIGNIFICANT CHANGE UP (ref 2–14)
NEUTROPHILS # BLD AUTO: 4.18 K/UL — SIGNIFICANT CHANGE UP (ref 1.8–7.4)
NEUTROPHILS # BLD AUTO: 4.18 K/UL — SIGNIFICANT CHANGE UP (ref 1.8–7.4)
NEUTROPHILS NFR BLD AUTO: 70.9 % — SIGNIFICANT CHANGE UP (ref 43–77)
NEUTROPHILS NFR BLD AUTO: 70.9 % — SIGNIFICANT CHANGE UP (ref 43–77)
NRBC # BLD: 0 /100 WBCS — SIGNIFICANT CHANGE UP (ref 0–0)
NRBC # BLD: 0 /100 WBCS — SIGNIFICANT CHANGE UP (ref 0–0)
NRBC # FLD: 0 K/UL — SIGNIFICANT CHANGE UP (ref 0–0)
NRBC # FLD: 0 K/UL — SIGNIFICANT CHANGE UP (ref 0–0)
PHOSPHATE SERPL-MCNC: 3.3 MG/DL — SIGNIFICANT CHANGE UP (ref 2.5–4.5)
PHOSPHATE SERPL-MCNC: 3.3 MG/DL — SIGNIFICANT CHANGE UP (ref 2.5–4.5)
PLATELET # BLD AUTO: 290 K/UL — SIGNIFICANT CHANGE UP (ref 150–400)
PLATELET # BLD AUTO: 290 K/UL — SIGNIFICANT CHANGE UP (ref 150–400)
POTASSIUM SERPL-MCNC: 4.2 MMOL/L — SIGNIFICANT CHANGE UP (ref 3.5–5.3)
POTASSIUM SERPL-MCNC: 4.2 MMOL/L — SIGNIFICANT CHANGE UP (ref 3.5–5.3)
POTASSIUM SERPL-SCNC: 4.2 MMOL/L — SIGNIFICANT CHANGE UP (ref 3.5–5.3)
POTASSIUM SERPL-SCNC: 4.2 MMOL/L — SIGNIFICANT CHANGE UP (ref 3.5–5.3)
RBC # BLD: 4.57 M/UL — SIGNIFICANT CHANGE UP (ref 3.8–5.2)
RBC # BLD: 4.57 M/UL — SIGNIFICANT CHANGE UP (ref 3.8–5.2)
RBC # FLD: 12.3 % — SIGNIFICANT CHANGE UP (ref 10.3–14.5)
RBC # FLD: 12.3 % — SIGNIFICANT CHANGE UP (ref 10.3–14.5)
SODIUM SERPL-SCNC: 139 MMOL/L — SIGNIFICANT CHANGE UP (ref 135–145)
SODIUM SERPL-SCNC: 139 MMOL/L — SIGNIFICANT CHANGE UP (ref 135–145)
WBC # BLD: 5.89 K/UL — SIGNIFICANT CHANGE UP (ref 3.8–10.5)
WBC # BLD: 5.89 K/UL — SIGNIFICANT CHANGE UP (ref 3.8–10.5)
WBC # FLD AUTO: 5.89 K/UL — SIGNIFICANT CHANGE UP (ref 3.8–10.5)
WBC # FLD AUTO: 5.89 K/UL — SIGNIFICANT CHANGE UP (ref 3.8–10.5)

## 2023-12-27 PROCEDURE — 99239 HOSP IP/OBS DSCHRG MGMT >30: CPT

## 2023-12-27 RX ORDER — SENNA PLUS 8.6 MG/1
2 TABLET ORAL
Qty: 0 | Refills: 0 | DISCHARGE
Start: 2023-12-27

## 2023-12-27 RX ORDER — POLYETHYLENE GLYCOL 3350 17 G/17G
17 POWDER, FOR SOLUTION ORAL
Qty: 0 | Refills: 0 | DISCHARGE
Start: 2023-12-27

## 2023-12-27 RX ORDER — OXYCODONE HYDROCHLORIDE 5 MG/1
1 TABLET ORAL
Qty: 30 | Refills: 0
Start: 2023-12-27 | End: 2023-12-31

## 2023-12-27 RX ORDER — SENNA PLUS 8.6 MG/1
2 TABLET ORAL
Qty: 14 | Refills: 0
Start: 2023-12-27 | End: 2024-01-02

## 2023-12-27 RX ORDER — ACETAMINOPHEN 500 MG
2 TABLET ORAL
Qty: 0 | Refills: 0 | DISCHARGE
Start: 2023-12-27

## 2023-12-27 RX ORDER — OXYCODONE HYDROCHLORIDE 5 MG/1
1 TABLET ORAL
Qty: 0 | Refills: 0 | DISCHARGE
Start: 2023-12-27

## 2023-12-27 RX ORDER — TIZANIDINE 4 MG/1
1 TABLET ORAL
Qty: 20 | Refills: 0
Start: 2023-12-27 | End: 2023-12-31

## 2023-12-27 RX ORDER — TIZANIDINE 4 MG/1
1 TABLET ORAL
Qty: 0 | Refills: 0 | DISCHARGE
Start: 2023-12-27

## 2023-12-27 RX ADMIN — Medication 650 MILLIGRAM(S): at 13:51

## 2023-12-27 RX ADMIN — Medication 650 MILLIGRAM(S): at 01:50

## 2023-12-27 RX ADMIN — TIZANIDINE 2 MILLIGRAM(S): 4 TABLET ORAL at 00:50

## 2023-12-27 RX ADMIN — Medication 650 MILLIGRAM(S): at 12:51

## 2023-12-27 RX ADMIN — Medication 650 MILLIGRAM(S): at 00:50

## 2023-12-27 RX ADMIN — TIZANIDINE 2 MILLIGRAM(S): 4 TABLET ORAL at 12:51

## 2023-12-27 RX ADMIN — Medication 4 MILLIGRAM(S): at 18:04

## 2023-12-27 RX ADMIN — TIZANIDINE 2 MILLIGRAM(S): 4 TABLET ORAL at 06:21

## 2023-12-27 RX ADMIN — Medication 4 MILLIGRAM(S): at 12:52

## 2023-12-27 RX ADMIN — Medication 650 MILLIGRAM(S): at 18:45

## 2023-12-27 RX ADMIN — Medication 650 MILLIGRAM(S): at 06:22

## 2023-12-27 RX ADMIN — LIDOCAINE 1 PATCH: 4 CREAM TOPICAL at 11:14

## 2023-12-27 RX ADMIN — FLUCONAZOLE 150 MILLIGRAM(S): 150 TABLET ORAL at 18:02

## 2023-12-27 RX ADMIN — POLYETHYLENE GLYCOL 3350 17 GRAM(S): 17 POWDER, FOR SOLUTION ORAL at 12:50

## 2023-12-27 RX ADMIN — Medication 650 MILLIGRAM(S): at 07:11

## 2023-12-27 RX ADMIN — Medication 4 MILLIGRAM(S): at 06:22

## 2023-12-27 NOTE — DISCHARGE NOTE PROVIDER - NSDCMRMEDTOKEN_GEN_ALL_CORE_FT
acetaminophen 325 mg oral tablet: 2 tab(s) orally every 6 hours   acetaminophen 325 mg oral tablet: 2 tab(s) orally every 6 hours  oxyCODONE 5 mg oral tablet: 1 tab(s) orally every 4 hours As needed Moderate Pain (4 - 6)  polyethylene glycol 3350 oral powder for reconstitution: 17 gram(s) orally once a day  senna leaf extract oral tablet: 2 tab(s) orally once a day (at bedtime)  tiZANidine 2 mg oral tablet: 1 tab(s) orally every 6 hours as needed for  muscle spasm   acetaminophen 325 mg oral tablet: 2 tab(s) orally every 6 hours  methylPREDNISolone 4 mg oral tablet: 4 milligram(s) orally once a day Medrol Dose Pack use as directed.  oxyCODONE 5 mg oral tablet: 1 tab(s) orally every 4 hours as needed for Moderate Pain (4 - 6) MDD: 6 tabs  polyethylene glycol 3350 oral powder for reconstitution: 17 gram(s) orally once a day  senna leaf extract oral tablet: 2 tab(s) orally once a day (at bedtime)  tiZANidine 2 mg oral tablet: 1 tab(s) orally every 6 hours as needed for  muscle spasm

## 2023-12-27 NOTE — DISCHARGE NOTE PROVIDER - PROVIDER TOKENS
PROVIDER:[TOKEN:[[id number]],FOLLOWUP:[2 weeks]] PROVIDER:[TOKEN:[[id number]],FOLLOWUP:[2 weeks]]

## 2023-12-27 NOTE — PHYSICAL THERAPY INITIAL EVALUATION ADULT - PERTINENT HX OF CURRENT PROBLEM, REHAB EVAL
Pt is a 43 year old female with history of c-spine fusion, lumbar laminectomy and microdiscectomy who presents with new onset back pain that started 2 days ago. She was placed initially in the CDU for MRI and pain control. She reports the pain is located on her left side of her thoracic region and describes it as sharp and worsens with movement and twisting motion. She also reports pain with inspiration. She also reports the chest pain was non-exertional, was brief and cannot describe the quality and non-radiating. She has previously had her chest pain worked up with stress test, which was normal (also reportedly had ST/T wave inversions at that time). At the initial time she had the pain, she took 500 mg naproxen and flexeril without relief. Denies fever, chills, shortness of breath, cough, nausea and vomiting, numbness and tingling/weakness. CT abdomen and pelvis wo contrast shows no hydronephrosis/or evidence of urolithiases MR cervical and thoracic spine: hypertrophic change in facet C 6-C7 and C7-T1 with moderate to severe narrowing of right neural foramen and severe narrowing of the left neural foramen. T8-T9 small left sided disc protrusion seen causing effacement of the thecal sac and abuts spinal cord. T9-T10 disc bulge worse on the right.

## 2023-12-27 NOTE — PROGRESS NOTE ADULT - PROBLEM SELECTOR PLAN 1
-CT abdomen and pelvis wo contrast shows no hydronephrosis/or evidence of urolithiases.   -MR cervical and thoracic spine: hypertrophic change in facet C 6-C7 and C7-T1 with moderate to severe narrowing of right neural foramen and severe narrowing of the left neural foramen. T8-T9 small left sided disc protrusion seen causing effacement of the thecal sac and abuts spinal cord. T9-T10 disc bulge worse on the right.  -acute pain consulted, appreciate recs, on zanaflex, oxycodone prn, lidocaine patch  - pt declined gabapentin or lyrica d/t previous adverse reactions  -d/w neurosurgery team, no acute surgical intervention, outpt f/u Dr. Ma in 2-4 weeks  -able to ambulate, PT ordered   -started on medrol pack on 12/25, ppi while on steroid   -bowel regimen while on pain meds  - dispo: DC plan home today on 5 more days of opiate and medrol dose pack, outpt f/u neurosurgery Dr. Ma as above  Time spent on discharge 31 minutes coordinating discharge plan and discussing with patient and family.

## 2023-12-27 NOTE — DISCHARGE NOTE PROVIDER - CARE PROVIDER_API CALL
Armaan Ma  Neurosurgery  [address]  [place] [address]  Phone: [phone]  Fax: [phone]  Follow Up Time: 2 weeks   Armaan Ma  Neurosurgery  [address]  [place] [address]  Phone: [phone]  Fax: [phone]  Follow Up Time: 2 weeks

## 2023-12-27 NOTE — DISCHARGE NOTE NURSING/CASE MANAGEMENT/SOCIAL WORK - PATIENT PORTAL LINK FT
You can access the FollowMyHealth Patient Portal offered by [facility] by registering at the following website: [email or web address]. By joining [employer]’s FollowMyHealth portal, you will also be able to view your health information using other applications (apps) compatible with our system. You can access the FollowMyHealth Patient Portal offered by [facility] by registering at the following website: [email or web address]. By joining [employer]’s FollowMyHealth portal, you will also be able to view your health information using other applications (apps) compatible with our system.

## 2023-12-27 NOTE — DISCHARGE NOTE PROVIDER - HOSPITAL COURSE
43 year old F with history of c-spine fusion, lumbar laminectomy and microdiscectomy who presents with new onset back pain that started 2 days ago.       Problem/Plan - 1:  ·  Problem: Back pain.   ·  Plan: -CT abdomen and pelvis wo contrast shows no hydronephrosis/or evidence of urolithiases.   -MR cervical and thoracic spine: hypertrophic change in facet C 6-C7 and C7-T1 with moderate to severe narrowing of right neural foramen and severe narrowing of the left neural foramen. T8-T9 small left sided disc protrusion seen causing effacement of the thecal sac and abuts spinal cord. T9-T10 disc bulge worse on the right.  -acute pain consulted, appreciate recs, on zanaflex, oxycodone prn, lidocaine patch  - pt declined gabapentin or lyrica d/t previous adverse reactions  -d/w neurosurgery team, no acute surgical intervention, outpt f/u Dr. Ma in 2-4 weeks  -able to ambulate, PT ordered   -started on medrol pack on 12/25, ppi while on steroid   -bowel regimen while on pain meds  - dispo: DC plan home today on 5 more days of opiate and medrol dose pack, outpt f/u neurosurgery Dr. Ma as above  Time spent on discharge 31 minutes coordinating discharge plan and discussing with patient and family.     Problem/Plan - 2:  ·  Problem: KORI (acute kidney injury).   ·  Plan: -Cr 1.2 on admission -doubt KORI, repeat Cr1.13   -UA/urine Na and CT abdomen and pelvis wo contrast without evidence of obstruction/ nephrolithiases   -continue to monitor BMP daily   -avoid nephrotoxins -NSAIDs included.

## 2023-12-27 NOTE — DISCHARGE NOTE PROVIDER - NSDCCPCAREPLAN_GEN_ALL_CORE_FT
PRINCIPAL DISCHARGE DIAGNOSIS  Diagnosis: Upper back pain  Assessment and Plan of Treatment: you have a bulging disc and arthritic changes in thoracic spine, continue to take muscle relaxant and pain meds as instructed, please follow up with neurosurgery Dr. Ma in 2-4 weeks

## 2023-12-27 NOTE — DISCHARGE NOTE NURSING/CASE MANAGEMENT/SOCIAL WORK - NSDCPECAREGIVERED_GEN_ALL_CORE
Medline and carenotes for Chronic back pain, Oxy IR, Pain MGT,  as well as DC Medications and side effects literature for patient reference.

## 2023-12-27 NOTE — DISCHARGE NOTE NURSING/CASE MANAGEMENT/SOCIAL WORK - NSDCPNINST_GEN_ALL_CORE
Continue to hydrate and Avoid strenuous activity, including constipation which may be a side effect from taking opioid medications. Call MD with any changes in sensation or mobility, ie. numbness and tingling, sudden increased unrelieved severe pain, inability to control bladder or bowels. Follow-up in the surgeon's office as instructed for continuity of care.

## 2023-12-27 NOTE — DISCHARGE NOTE NURSING/CASE MANAGEMENT/SOCIAL WORK - NSDCPEFALRISK_GEN_ALL_CORE
For information on Fall & Injury Prevention, visit: [email or web address] OR  [email or web address] OR  https://www.cdc.gov/steadi/patient.html For information on Fall & Injury Prevention, visit: [email or web address] OR  [email or web address] OR  https://www.cdc.gov/steadi/patient.html

## 2023-12-27 NOTE — PHYSICAL THERAPY INITIAL EVALUATION ADULT - ADDITIONAL COMMENTS
Pt lives with her children in an apartment with 3 steps to enter and elevator access. Pt did not use an assistive device and was independent with ADLs prior. Pt reports a few falls in the past 6 months.   Pt left sitting at edge of the bed in NAD, call bell in reach and ILANA Calderon made aware.

## 2023-12-27 NOTE — PROGRESS NOTE ADULT - SUBJECTIVE AND OBJECTIVE BOX
Dr. Alla Mcdermott  Pager [phone]    PROGRESS NOTE:     Patient is a 43y old  Female who presents with a chief complaint of back pain control (26 Dec 2023 14:17)      SUBJECTIVE / OVERNIGHT EVENTS: denies chest pain or sob   ADDITIONAL REVIEW OF SYSTEMS: afebrile , back pain better, agreeable to go home today    MEDICATIONS  (STANDING):  acetaminophen     Tablet .. 650 milliGRAM(s) Oral every 6 hours  fluconAZOLE   Tablet 150 milliGRAM(s) Oral once  heparin   Injectable 5000 Unit(s) SubCutaneous every 8 hours  lidocaine   4% Patch 1 Patch Transdermal daily  methylPREDNISolone   Oral   methylPREDNISolone 4 milliGRAM(s) Oral before breakfast  methylPREDNISolone 4 milliGRAM(s) Oral after lunch  methylPREDNISolone 4 milliGRAM(s) Oral at bedtime  methylPREDNISolone 4 milliGRAM(s) Oral after dinner  polyethylene glycol 3350 17 Gram(s) Oral daily  senna 2 Tablet(s) Oral at bedtime  tiZANidine 2 milliGRAM(s) Oral every 6 hours    MEDICATIONS  (PRN):  HYDROmorphone  Injectable 0.3 milliGRAM(s) IV Push every 4 hours PRN for breakthrough severe pain (7-10),  must take oxycodone first  melatonin 3 milliGRAM(s) Oral at bedtime PRN Insomnia  ondansetron Injectable 4 milliGRAM(s) IV Push once PRN Nausea and/or Vomiting  oxyCODONE    IR 7.5 milliGRAM(s) Oral every 4 hours PRN Severe Pain (7 - 10)  oxyCODONE    IR 5 milliGRAM(s) Oral every 4 hours PRN Moderate Pain (4 - 6)      CAPILLARY BLOOD GLUCOSE        I&O's Summary      PHYSICAL EXAM:  Vital Signs Last 24 Hrs  T(C): 36.6 (27 Dec 2023 09:51), Max: 36.8 (26 Dec 2023 22:40)  T(F): 97.9 (27 Dec 2023 09:51), Max: 98.3 (27 Dec 2023 02:03)  HR: 55 (27 Dec 2023 09:51) (30 - 61)  BP: 131/81 (27 Dec 2023 09:51) (126/76 - 143/92)  BP(mean): --  RR: 18 (27 Dec 2023 09:51) (18 - 18)  SpO2: 100% (27 Dec 2023 09:51) (93% - 100%)    Parameters below as of 27 Dec 2023 09:51  Patient On (Oxygen Delivery Method): room air      CONSTITUTIONAL: NAD, well-developed  RESPIRATORY: Normal respiratory effort; lungs are clear to auscultation bilaterally  CARDIOVASCULAR: Regular rate and rhythm, normal S1 and S2, no murmur/rub/gallop; No lower extremity edema; Peripheral pulses are 2+ bilaterally  ABDOMEN: Nontender to palpation, normoactive bowel sounds, no rebound/guarding; No hepatosplenomegaly  MUSCULOSKELETAL: no clubbing or cyanosis of digits; no joint swelling or tenderness to palpation  PSYCH: A+O to person, place, and time; affect appropriate  LABS:                        13.3   5.89  )-----------( 290      ( 27 Dec 2023 06:15 )             39.7     12-27    139  |  103  |  15  ----------------------------<  94  4.2   |  25  |  0.98    Ca    8.8      27 Dec 2023 06:15  Phos  3.3     12-27  Mg     2.10     12-27            Urinalysis Basic - ( 27 Dec 2023 06:15 )    Color: x / Appearance: x / SG: x / pH: x  Gluc: 94 mg/dL / Ketone: x  / Bili: x / Urobili: x   Blood: x / Protein: x / Nitrite: x   Leuk Esterase: x / RBC: x / WBC x   Sq Epi: x / Non Sq Epi: x / Bacteria: x          RADIOLOGY & ADDITIONAL TESTS:  Results Reviewed:   Imaging Personally Reviewed:  Electrocardiogram Personally Reviewed:    COORDINATION OF CARE:  Care Discussed with Consultants/Other Providers [Y/N]:  Prior or Outpatient Records Reviewed [Y/N]:   Dr. Alla Mcdermott  Pager [phone]    PROGRESS NOTE:     Patient is a 43y old  Female who presents with a chief complaint of back pain control (26 Dec 2023 14:17)      SUBJECTIVE / OVERNIGHT EVENTS: denies chest pain or sob   ADDITIONAL REVIEW OF SYSTEMS: afebrile , back pain better, agreeable to go home today    MEDICATIONS  (STANDING):  acetaminophen     Tablet .. 650 milliGRAM(s) Oral every 6 hours  fluconAZOLE   Tablet 150 milliGRAM(s) Oral once  heparin   Injectable 5000 Unit(s) SubCutaneous every 8 hours  lidocaine   4% Patch 1 Patch Transdermal daily  methylPREDNISolone   Oral   methylPREDNISolone 4 milliGRAM(s) Oral before breakfast  methylPREDNISolone 4 milliGRAM(s) Oral after lunch  methylPREDNISolone 4 milliGRAM(s) Oral at bedtime  methylPREDNISolone 4 milliGRAM(s) Oral after dinner  polyethylene glycol 3350 17 Gram(s) Oral daily  senna 2 Tablet(s) Oral at bedtime  tiZANidine 2 milliGRAM(s) Oral every 6 hours    MEDICATIONS  (PRN):  HYDROmorphone  Injectable 0.3 milliGRAM(s) IV Push every 4 hours PRN for breakthrough severe pain (7-10),  must take oxycodone first  melatonin 3 milliGRAM(s) Oral at bedtime PRN Insomnia  ondansetron Injectable 4 milliGRAM(s) IV Push once PRN Nausea and/or Vomiting  oxyCODONE    IR 7.5 milliGRAM(s) Oral every 4 hours PRN Severe Pain (7 - 10)  oxyCODONE    IR 5 milliGRAM(s) Oral every 4 hours PRN Moderate Pain (4 - 6)      CAPILLARY BLOOD GLUCOSE        I&O's Summary      PHYSICAL EXAM:  Vital Signs Last 24 Hrs  T(C): 36.6 (27 Dec 2023 09:51), Max: 36.8 (26 Dec 2023 22:40)  T(F): 97.9 (27 Dec 2023 09:51), Max: 98.3 (27 Dec 2023 02:03)  HR: 55 (27 Dec 2023 09:51) (30 - 61)  BP: 131/81 (27 Dec 2023 09:51) (126/76 - 143/92)  BP(mean): --  RR: 18 (27 Dec 2023 09:51) (18 - 18)  SpO2: 100% (27 Dec 2023 09:51) (93% - 100%)    Parameters below as of 27 Dec 2023 09:51  Patient On (Oxygen Delivery Method): room air      CONSTITUTIONAL: NAD, well-developed  RESPIRATORY: Normal respiratory effort; lungs are clear to auscultation bilaterally  CARDIOVASCULAR: Regular rate and rhythm, normal S1 and S2, no murmur/rub/gallop; No lower extremity edema; Peripheral pulses are 2+ bilaterally  ABDOMEN: Nontender to palpation, normoactive bowel sounds, no rebound/guarding; No hepatosplenomegaly  MUSCULOSKELETAL: no clubbing or cyanosis of digits; no joint swelling or tenderness to palpation  PSYCH: A+O to person, place, and time; affect appropriate  LABS:                        13.3   5.89  )-----------( 290      ( 27 Dec 2023 06:15 )             39.7     12-27    139  |  103  |  15  ----------------------------<  94  4.2   |  25  |  0.98    Ca    8.8      27 Dec 2023 06:15  Phos  3.3     12-27  Mg     2.10     12-27            Urinalysis Basic - ( 27 Dec 2023 06:15 )    Color: x / Appearance: x / SG: x / pH: x  Gluc: 94 mg/dL / Ketone: x  / Bili: x / Urobili: x   Blood: x / Protein: x / Nitrite: x   Leuk Esterase: x / RBC: x / WBC x   Sq Epi: x / Non Sq Epi: x / Bacteria: x          RADIOLOGY & ADDITIONAL TESTS:  Results Reviewed:   Imaging Personally Reviewed:  Electrocardiogram Personally Reviewed:    COORDINATION OF CARE:  Care Discussed with Consultants/Other Providers [Y/N]:  Prior or Outpatient Records Reviewed [Y/N]:

## 2024-01-08 ENCOUNTER — EMERGENCY (EMERGENCY)
Facility: HOSPITAL | Age: 44
LOS: 1 days | Discharge: HOME | End: 2024-01-08
Attending: STUDENT IN AN ORGANIZED HEALTH CARE EDUCATION/TRAINING PROGRAM | Admitting: STUDENT IN AN ORGANIZED HEALTH CARE EDUCATION/TRAINING PROGRAM
Payer: COMMERCIAL

## 2024-01-08 VITALS
HEART RATE: 85 BPM | SYSTOLIC BLOOD PRESSURE: 147 MMHG | TEMPERATURE: 98 F | DIASTOLIC BLOOD PRESSURE: 96 MMHG | OXYGEN SATURATION: 100 % | RESPIRATION RATE: 18 BRPM

## 2024-01-08 DIAGNOSIS — Z98.890 OTHER SPECIFIED POSTPROCEDURAL STATES: Chronic | ICD-10-CM

## 2024-01-08 DIAGNOSIS — Z98.1 ARTHRODESIS STATUS: Chronic | ICD-10-CM

## 2024-01-08 DIAGNOSIS — Z98.89 OTHER SPECIFIED POSTPROCEDURAL STATES: Chronic | ICD-10-CM

## 2024-01-08 LAB
ANION GAP SERPL CALC-SCNC: 11 MMOL/L — SIGNIFICANT CHANGE UP (ref 7–14)
ANION GAP SERPL CALC-SCNC: 11 MMOL/L — SIGNIFICANT CHANGE UP (ref 7–14)
BUN SERPL-MCNC: 9 MG/DL — SIGNIFICANT CHANGE UP (ref 7–23)
BUN SERPL-MCNC: 9 MG/DL — SIGNIFICANT CHANGE UP (ref 7–23)
CALCIUM SERPL-MCNC: 8.9 MG/DL — SIGNIFICANT CHANGE UP (ref 8.4–10.5)
CALCIUM SERPL-MCNC: 8.9 MG/DL — SIGNIFICANT CHANGE UP (ref 8.4–10.5)
CHLORIDE SERPL-SCNC: 106 MMOL/L — SIGNIFICANT CHANGE UP (ref 98–107)
CHLORIDE SERPL-SCNC: 106 MMOL/L — SIGNIFICANT CHANGE UP (ref 98–107)
CO2 SERPL-SCNC: 23 MMOL/L — SIGNIFICANT CHANGE UP (ref 22–31)
CO2 SERPL-SCNC: 23 MMOL/L — SIGNIFICANT CHANGE UP (ref 22–31)
CREAT SERPL-MCNC: 0.9 MG/DL — SIGNIFICANT CHANGE UP (ref 0.5–1.3)
CREAT SERPL-MCNC: 0.9 MG/DL — SIGNIFICANT CHANGE UP (ref 0.5–1.3)
EGFR: 80 ML/MIN/1.73M2 — SIGNIFICANT CHANGE UP
EGFR: 80 ML/MIN/1.73M2 — SIGNIFICANT CHANGE UP
GLUCOSE SERPL-MCNC: 99 MG/DL — SIGNIFICANT CHANGE UP (ref 70–99)
GLUCOSE SERPL-MCNC: 99 MG/DL — SIGNIFICANT CHANGE UP (ref 70–99)
HCT VFR BLD CALC: 43.8 % — SIGNIFICANT CHANGE UP (ref 34.5–45)
HCT VFR BLD CALC: 43.8 % — SIGNIFICANT CHANGE UP (ref 34.5–45)
HGB BLD-MCNC: 14.2 G/DL — SIGNIFICANT CHANGE UP (ref 11.5–15.5)
HGB BLD-MCNC: 14.2 G/DL — SIGNIFICANT CHANGE UP (ref 11.5–15.5)
MCHC RBC-ENTMCNC: 28.9 PG — SIGNIFICANT CHANGE UP (ref 27–34)
MCHC RBC-ENTMCNC: 28.9 PG — SIGNIFICANT CHANGE UP (ref 27–34)
MCHC RBC-ENTMCNC: 32.4 GM/DL — SIGNIFICANT CHANGE UP (ref 32–36)
MCHC RBC-ENTMCNC: 32.4 GM/DL — SIGNIFICANT CHANGE UP (ref 32–36)
MCV RBC AUTO: 89.2 FL — SIGNIFICANT CHANGE UP (ref 80–100)
MCV RBC AUTO: 89.2 FL — SIGNIFICANT CHANGE UP (ref 80–100)
NRBC # BLD: 0 /100 WBCS — SIGNIFICANT CHANGE UP (ref 0–0)
NRBC # BLD: 0 /100 WBCS — SIGNIFICANT CHANGE UP (ref 0–0)
NRBC # FLD: 0 K/UL — SIGNIFICANT CHANGE UP (ref 0–0)
NRBC # FLD: 0 K/UL — SIGNIFICANT CHANGE UP (ref 0–0)
PLATELET # BLD AUTO: 327 K/UL — SIGNIFICANT CHANGE UP (ref 150–400)
PLATELET # BLD AUTO: 327 K/UL — SIGNIFICANT CHANGE UP (ref 150–400)
POTASSIUM SERPL-MCNC: 3.9 MMOL/L — SIGNIFICANT CHANGE UP (ref 3.5–5.3)
POTASSIUM SERPL-MCNC: 3.9 MMOL/L — SIGNIFICANT CHANGE UP (ref 3.5–5.3)
POTASSIUM SERPL-SCNC: 3.9 MMOL/L — SIGNIFICANT CHANGE UP (ref 3.5–5.3)
POTASSIUM SERPL-SCNC: 3.9 MMOL/L — SIGNIFICANT CHANGE UP (ref 3.5–5.3)
RBC # BLD: 4.91 M/UL — SIGNIFICANT CHANGE UP (ref 3.8–5.2)
RBC # BLD: 4.91 M/UL — SIGNIFICANT CHANGE UP (ref 3.8–5.2)
RBC # FLD: 13 % — SIGNIFICANT CHANGE UP (ref 10.3–14.5)
RBC # FLD: 13 % — SIGNIFICANT CHANGE UP (ref 10.3–14.5)
SODIUM SERPL-SCNC: 140 MMOL/L — SIGNIFICANT CHANGE UP (ref 135–145)
SODIUM SERPL-SCNC: 140 MMOL/L — SIGNIFICANT CHANGE UP (ref 135–145)
WBC # BLD: 5.9 K/UL — SIGNIFICANT CHANGE UP (ref 3.8–10.5)
WBC # BLD: 5.9 K/UL — SIGNIFICANT CHANGE UP (ref 3.8–10.5)
WBC # FLD AUTO: 5.9 K/UL — SIGNIFICANT CHANGE UP (ref 3.8–10.5)
WBC # FLD AUTO: 5.9 K/UL — SIGNIFICANT CHANGE UP (ref 3.8–10.5)

## 2024-01-08 PROCEDURE — 99223 1ST HOSP IP/OBS HIGH 75: CPT

## 2024-01-08 PROCEDURE — 93010 ELECTROCARDIOGRAM REPORT: CPT | Mod: 1L

## 2024-01-08 PROCEDURE — 72125 CT NECK SPINE W/O DYE: CPT | Mod: 26,MA

## 2024-01-08 PROCEDURE — 72131 CT LUMBAR SPINE W/O DYE: CPT | Mod: 26,MA

## 2024-01-08 RX ORDER — CYCLOBENZAPRINE HYDROCHLORIDE 10 MG/1
1 TABLET, FILM COATED ORAL
Qty: 9 | Refills: 0
Start: 2024-01-08 | End: 2024-01-10

## 2024-01-08 RX ORDER — KETOROLAC TROMETHAMINE 30 MG/ML
15 SYRINGE (ML) INJECTION EVERY 6 HOURS
Refills: 0 | Status: DISCONTINUED | OUTPATIENT
Start: 2024-01-08 | End: 2024-01-09

## 2024-01-08 RX ORDER — LIDOCAINE 4 G/100G
1 CREAM TOPICAL ONCE
Refills: 0 | Status: COMPLETED | OUTPATIENT
Start: 2024-01-08 | End: 2024-01-08

## 2024-01-08 RX ORDER — DIAZEPAM 5 MG
5 TABLET ORAL ONCE
Refills: 0 | Status: DISCONTINUED | OUTPATIENT
Start: 2024-01-08 | End: 2024-01-08

## 2024-01-08 RX ORDER — OXYCODONE HYDROCHLORIDE 5 MG/1
5 TABLET ORAL EVERY 6 HOURS
Refills: 0 | Status: DISCONTINUED | OUTPATIENT
Start: 2024-01-08 | End: 2024-01-09

## 2024-01-08 RX ORDER — ACETAMINOPHEN 500 MG
975 TABLET ORAL ONCE
Refills: 0 | Status: COMPLETED | OUTPATIENT
Start: 2024-01-08 | End: 2024-01-08

## 2024-01-08 RX ORDER — CYCLOBENZAPRINE HYDROCHLORIDE 10 MG/1
5 TABLET, FILM COATED ORAL ONCE
Refills: 0 | Status: COMPLETED | OUTPATIENT
Start: 2024-01-08 | End: 2024-01-08

## 2024-01-08 RX ORDER — IBUPROFEN 200 MG
400 TABLET ORAL ONCE
Refills: 0 | Status: COMPLETED | OUTPATIENT
Start: 2024-01-08 | End: 2024-01-08

## 2024-01-08 RX ORDER — ACETAMINOPHEN 500 MG
650 TABLET ORAL EVERY 6 HOURS
Refills: 0 | Status: DISCONTINUED | OUTPATIENT
Start: 2024-01-08 | End: 2024-01-11

## 2024-01-08 RX ORDER — OXYCODONE HYDROCHLORIDE 5 MG/1
5 TABLET ORAL ONCE
Refills: 0 | Status: DISCONTINUED | OUTPATIENT
Start: 2024-01-08 | End: 2024-01-08

## 2024-01-08 RX ORDER — KETOROLAC TROMETHAMINE 30 MG/ML
30 SYRINGE (ML) INJECTION ONCE
Refills: 0 | Status: DISCONTINUED | OUTPATIENT
Start: 2024-01-08 | End: 2024-01-08

## 2024-01-08 RX ADMIN — Medication 975 MILLIGRAM(S): at 10:31

## 2024-01-08 RX ADMIN — Medication 5 MILLIGRAM(S): at 13:55

## 2024-01-08 RX ADMIN — LIDOCAINE 1 PATCH: 4 CREAM TOPICAL at 19:54

## 2024-01-08 RX ADMIN — OXYCODONE HYDROCHLORIDE 5 MILLIGRAM(S): 5 TABLET ORAL at 18:31

## 2024-01-08 RX ADMIN — Medication 975 MILLIGRAM(S): at 09:33

## 2024-01-08 RX ADMIN — CYCLOBENZAPRINE HYDROCHLORIDE 5 MILLIGRAM(S): 10 TABLET, FILM COATED ORAL at 09:31

## 2024-01-08 RX ADMIN — OXYCODONE HYDROCHLORIDE 5 MILLIGRAM(S): 5 TABLET ORAL at 19:01

## 2024-01-08 RX ADMIN — Medication 5 MILLIGRAM(S): at 21:13

## 2024-01-08 RX ADMIN — Medication 30 MILLIGRAM(S): at 16:36

## 2024-01-08 RX ADMIN — LIDOCAINE 1 PATCH: 4 CREAM TOPICAL at 13:56

## 2024-01-08 NOTE — ED ADULT TRIAGE NOTE - SOURCE OF INFORMATION
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 His post-transplant course was fairly unremarkable with minimal nausea and mucositis, which are now resolved. Johnathon presents to [facility] [address] today with his mother for follow-up labwork and exam. He reports that he has had a good, uneventful week. He has generally been feeling very well and going out for walks daily. 1 episode of loose stools yesterday after eating a cheeseburger and over last weekend likely due to his food. Denies fever or other GI symptoms. Eating and drinking well per his baseline, sleeping well and maintaining good energy. Denies rash, URI symptoms, pain, headache, or other concerns.    Review of Systems: Pertinent positives include those mentioned in interval events. A complete review of systems was performed and is otherwise negative.      Medications:  Pantoprazole 40 mg DAILY  Posaconazole 100 mg DAILY  Valacyclovir 500 mg TID  Bactrim 400/80 mg BID Mondays and Tuesdays    Physical Exam:  /79   Pulse 103   Temp 99.1  F (37.3  C) (Oral)   Resp 18   Ht 1.527 m (5' 0.12\")   Wt 59.7 kg (131 lb 9.8 oz)   SpO2 97%   BMI 25.60 kg/m      Karnofsy 100%  GEN: Sitting in exam room chair in NAD. Pleasant and cooperative. Mother present.   HEENT: Normocephalic, atraumatic, sclerae anicteric and non-injected,  PERRL, nares patent, OP clear,  MMM  NECK: Supple. No cervical lymphadenopathy.  CARD: RRR, normal S1/S2, no murmurs/rubs/gallops, 2+ peripheral pulses, brisk capillary refill  RESP: Clear to auscultation bilaterally, no crackles/wheeze/rhonch, normal work and rate of breathing  ABD: Soft, not tender, not distended. " TOO.  EXTREM: WWP, KATELIN  SKIN: No erythema, rash or bruising noted  NEURO: No focal deficits.   ACCESS: CVC    Labs:  Results for orders placed or performed in visit on 02/24/22 (from the past 24 hour(s))   Lactate Dehydrogenase   Result Value Ref Range    Lactate Dehydrogenase 323 (H) 0 - 298 U/L   Phosphorus   Result Value Ref Range    Phosphorus 4.4 2.9 - 5.4 mg/dL   Magnesium   Result Value Ref Range    Magnesium 2.3 1.6 - 2.3 mg/dL   Comprehensive metabolic panel (BMP + Alb, Alk Phos, ALT, AST, Total. Bili, TP)   Result Value Ref Range    Sodium 139 133 - 143 mmol/L    Potassium 3.4 3.4 - 5.3 mmol/L    Chloride 109 98 - 110 mmol/L    Carbon Dioxide (CO2) 25 20 - 32 mmol/L    Anion Gap 5 3 - 14 mmol/L    Urea Nitrogen 13 7 - 21 mg/dL    Creatinine 0.67 0.39 - 0.73 mg/dL    Calcium 9.2 8.5 - 10.1 mg/dL    Glucose 91 70 - 99 mg/dL    Alkaline Phosphatase 201 130 - 530 U/L    AST 82 (H) 0 - 35 U/L     (H) 0 - 50 U/L    Protein Total 6.8 6.8 - 8.8 g/dL    Albumin 3.5 3.4 - 5.0 g/dL    Bilirubin Total 0.3 0.2 - 1.3 mg/dL    GFR Estimate     CBC with platelets and differential    Narrative    The following orders were created for panel order CBC with platelets and differential.  Procedure                               Abnormality         Status                     ---------                               -----------         ------                     CBC with platelets and d...[[id number]]  Abnormal            Final result               RBC and Platelet Morphology[[id number]]  Abnormal            Final result                 Please view results for these tests on the individual orders.   CBC with platelets and differential   Result Value Ref Range    WBC Count 3.5 (L) 4.0 - 11.0 10e3/uL    RBC Count 3.24 (L) 3.70 - 5.30 10e6/uL    Hemoglobin 11.0 (L) 11.7 - 15.7 g/dL    Hematocrit 34.0 (L) 35.0 - 47.0 %     (H) 77 - 100 fL    MCH 34.0 (H) 26.5 - 33.0 pg    MCHC 32.4 31.5 - 36.5 g/dL    RDW 14.9 10.0 - 15.0  %    Platelet Count 103 (L) 150 - 450 10e3/uL    % Neutrophils 57 %    % Lymphocytes 21 %    % Monocytes 21 %    % Eosinophils 0 %    % Basophils 0 %    % Immature Granulocytes 1 %    NRBCs per 100 WBC 0 <1 /100    Absolute Neutrophils 2.0 1.3 - 7.0 10e3/uL    Absolute Lymphocytes 0.7 (L) 1.0 - 5.8 10e3/uL    Absolute Monocytes 0.7 0.0 - 1.3 10e3/uL    Absolute Eosinophils 0.0 0.0 - 0.7 10e3/uL    Absolute Basophils 0.0 0.0 - 0.2 10e3/uL    Absolute Immature Granulocytes 0.0 <=0.4 10e3/uL    Absolute NRBCs 0.0 10e3/uL   RBC and Platelet Morphology   Result Value Ref Range    Platelet Assessment  Automated Count Confirmed. Platelet morphology is normal.     Automated Count Confirmed. Platelet morphology is normal.    Teardrop Cells Slight (A) None Seen    RBC Morphology Confirmed RBC Indices    Protein  random urine   Result Value Ref Range    Total Protein Random Urine g/L 0.47 g/L    Total Protein Urine g/gr Creatinine 0.21 (H) 0.00 - 0.20 g/g Cr    Creatinine Urine mg/dL 226 mg/dL       Assessment/Plan:  Johnathon Kemp is a 12 year old with a diagnosis of Fanconi Anemia, who recently underwent his second hematopoetic stem cell transplant per protocol 2017-17 for treatment of his disease following initial graft failure and auto recovery. Post-transplant complications very minimal, did not require TPN and took very few pain medications. He was clinically well at discharge.     Currently day +29, engrafted and remains clinically well. Requiring intermittent GCSF, last dose 2/21. Eating and drinking well. No evidence of GVHD.      BONE MARROW TRANSPLANT  # Fanconi anemia/BMT Johnathon carries a diagnosis of Fanconi Anemia, for which he underwent an alpha/beta T cell depleted bone marrow transplant. His preparative regimen consisted of TBI, Cytoxan, fludarabine, Methylprednisolone, and Rituximab, and transplant occurred on 1/26/22. Neutrophil recovery acheived day + 12.   - Day +21 bone marrow biopsy 2/15: 80-90%  cellularity. Flow negative. 100% donor engraftment.     # Risk for GvHD: Johnathon required no GvHD prophylaxis, as graft T cell dose was below threshold per protocol.      FEN/RENAL  # Risk for Malnutrition: Johnathon did not require TPN. He continues to eat well. Continue to closely monitor.     # Risk for Fluid Imbalance: Baseline/goal weight is 59  kg, stable. He is drinking approximately 1500 ml of fluid daily.     # Risk for Renal Insufficiency: Johnathon's pretransplant GFR was found to be 100 mls/min. No recent concerns.      # Risk for Atypical HUS/Transplant-Associated TMA: Johnathon was at risk for aHUS/TA-TMA and underwent surveillance LDH  and urine Protein/Creatinine monitoring.   -  (2/24)  - Protein total urine: 0.21 (2/24)  - This should continue weekly in the outpatient setting.     CARDIOVASCULAR  # Risk for cardiotoxicity secondary to chemotherapy: Johnathon's work up ECHO (1/6) revealed normal anatomy and function with EF of 54%, and EKG (1/5) revealed NSR with QTc of 405. There were no concerns for cardiac dysfunction during admission. Continue to monitor.     # History of QT prolongation:  Johnathon has a history of prolonged QTc with QT prolonging medications.      HEMATOLOGY  # Pancytopenia Secondary to Chemotherapy: Johnathon experienced expected cytopenias secondary to chemotherapy. He was transfused for a hemoglobin < 7 and platelets <10,000.    - He requires pRBCs over 4 hours and platelets over 2 hours, both with premedications of tylenol and benadryl.   - GCSF was started on day +1 per protocol, he received his last dose per protocol on day of discharge 2/7  and continues PRN for ANC <1000.    - Hgb stable, platelets trending down.   - ANC 0.9 2/15 and 0.5 2/21, one dose of GCSF each time given, ANC 2.0 today     INFECTIOUS DISEASE  # Risk for Opportunistic Infection with need for Prophylaxis:  Johnathon began Levaquin for bacterial prophylaxis until count recovery on 2/7/22.   - Viral ppx: He  remains on Valtrex for viral prophylaxis due to CMV + serology pre-transplant; weekly CMV PCR monitoring performed, most recently <137 on 2/21. EBV PCR (2/21) negative. CMV pending from today 2/24.  - Fungal ppx:  Posaconazole. Level 5.6 2/17, decreased dose to 100 mg daily on 2/23, next level 3/3/22.   - PjP prophylaxis of Bactrim, began 2/21    # CMV viremia, <137 on 2/21, repeat 2/24 pending     # Risk for COVID: s/p Evusheld antibody on 1/11 as pre-exposure prophylaxis     Past infections:   - Bacillus cereus bacteremia (5/9/21) s/p IV Vancomycin (5/9-5/19)  - Streptococcus mitis/oralis bacteremia (6/2016)  - C. diff colitis (6/2016)  - multiple AOM - 4 sets of PE tubes       GASTROINTESTINAL  # Risk for GERD: Johnathon was started on daily protonix at admission for gastritis prevention. Per verbal discussion day of discharge, GI would like protonix to continue 6 months post transplant.    # Transaminitis: improving, suspect 2/2 posaconazole, better after posaconazole dose adjustment      Disposition:  RTC on 2/28 for provider visit and labs.    This patient was seen and discussed with Pediatric Blood and Marrow Transplant & Cellular Therapies Attending, Dr. Ankita Dolan.    Kervin Pennington MD  Pediatric BMT Fellow      Ankita Dolan MD, MSc, [facility]  Professor of Pediatrics  Blood and Marrow Transplantation & Cellular Therapy Program  [phone]      I, Ankita Dolan MD, saw this patient with the fellow and agree with the fellow's findings and plan of care as documented in the note above with my edits. I spent a total of 75 minutes with Johnathon Kemp on the date of encounter doing chart review, review of labs/imaging, discussion with the family, BMT team, documentation and further activities as noted above.     Patient Active Problem List   Diagnosis     Anemia, Fanconi (H)     Bone marrow transplant candidate     Fanconi's anemia (H)     Examination of participant or control in  clinical research     Sepsis (H)     Fever     Neutropenia (H)     Bone marrow transplant rejection (H)     Autoimmune thrombocytopenia (H)     Other pancytopenia (H)           Ankita Dolan MD   Patient/EMS
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
## 2024-01-08 NOTE — ED ADULT TRIAGE NOTE - NS ED NURSE AMBULANCES
[facility]

## 2024-01-08 NOTE — ED PROVIDER NOTE - PHYSICAL EXAMINATION
VITALS: reviewed  GEN: NAD, A & O x 4  HEAD/EYES: NCAT, PERRL, EOMI, anicteric sclerae,  ENT: mucus membranes moist, oropharynx WNL, trachea midline,  RESP: lungs CTA with equal breath sounds bilaterally, chest wall nontender and atraumatic  CV: heart with reg rhythm S1, S2, distal pulses intact and symmetric bilaterally  ABDOMEN: soft, nontender, no palpable masses  : no CVAT  MSK: extremities atraumatic and nontender, no edema, no asymmetry. the back is with L lateral and midline ttp, there is no spinal deformity or stepoff and the back is ranged painlessly. the neck has midline tenderness and L sided ttp, no deformity, or stepoff, in c collar  SKIN: warm, dry, no rash, no bruising, no cyanosis. color appropriate for ethnicity  NEURO: alert, mentating appropriately, no facial asymmetry. gross sensation, motor are intact  PSYCH: Affect appropriate

## 2024-01-08 NOTE — ED ADULT NURSE NOTE - NSFALLUNIVINTERV_ED_ALL_ED
Bed/Stretcher in lowest position, wheels locked, appropriate side rails in place/Call bell, personal items and telephone in reach/Instruct patient to call for assistance before getting out of bed/chair/stretcher/Non-slip footwear applied when patient is off stretcher/[place] to call system/Physically safe environment - no spills, clutter or unnecessary equipment/Purposeful proactive rounding/Room/bathroom lighting operational, light cord in reach Bed/Stretcher in lowest position, wheels locked, appropriate side rails in place/Call bell, personal items and telephone in reach/Instruct patient to call for assistance before getting out of bed/chair/stretcher/Non-slip footwear applied when patient is off stretcher/[place] to call system/Physically safe environment - no spills, clutter or unnecessary equipment/Purposeful proactive rounding/Room/bathroom lighting operational, light cord in reach
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
## 2024-01-08 NOTE — ED PROVIDER NOTE - PATIENT PORTAL LINK FT
You can access the FollowMyHealth Patient Portal offered by [facility] by registering at the following website: [email or web address]. By joining [employer]’s FollowMyHealth portal, you will also be able to view your health information using other applications (apps) compatible with our system. You can access the FollowMyHealth Patient Portal offered by [facility] by registering at the following website: [email or web address]. By joining [employer]’s FollowMyHealth portal, you will also be able to view your health information using other applications (apps) compatible with our system.

## 2024-01-08 NOTE — ED ADULT NURSE REASSESSMENT NOTE - NS ED NURSE REASSESS COMMENT FT1
Break RN: pt a&ox4 denying chest pain, sob, headache, dizziness, n+v, vision changes at this time. Breathing even, unlabored. Report given to ILANA Chaidez.
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
## 2024-01-09 VITALS
RESPIRATION RATE: 18 BRPM | HEART RATE: 78 BPM | SYSTOLIC BLOOD PRESSURE: 130 MMHG | TEMPERATURE: 98 F | OXYGEN SATURATION: 96 % | DIASTOLIC BLOOD PRESSURE: 80 MMHG

## 2024-01-09 PROCEDURE — 72141 MRI NECK SPINE W/O DYE: CPT | Mod: 26,MA

## 2024-01-09 PROCEDURE — 99239 HOSP IP/OBS DSCHRG MGMT >30: CPT

## 2024-01-09 PROCEDURE — 73564 X-RAY EXAM KNEE 4 OR MORE: CPT | Mod: 26,LT

## 2024-01-09 PROCEDURE — 72146 MRI CHEST SPINE W/O DYE: CPT | Mod: 26,MA

## 2024-01-09 PROCEDURE — 72148 MRI LUMBAR SPINE W/O DYE: CPT | Mod: 26,MA

## 2024-01-09 PROCEDURE — 71046 X-RAY EXAM CHEST 2 VIEWS: CPT | Mod: 26

## 2024-01-09 PROCEDURE — 93010 ELECTROCARDIOGRAM REPORT: CPT

## 2024-01-09 PROCEDURE — 73030 X-RAY EXAM OF SHOULDER: CPT | Mod: 26,LT

## 2024-01-09 RX ORDER — FAMOTIDINE 10 MG/ML
20 INJECTION INTRAVENOUS ONCE
Refills: 0 | Status: COMPLETED | OUTPATIENT
Start: 2024-01-09 | End: 2024-01-09

## 2024-01-09 RX ORDER — ACETAMINOPHEN 500 MG
2 TABLET ORAL
Qty: 32 | Refills: 0
Start: 2024-01-09 | End: 2024-01-12

## 2024-01-09 RX ORDER — IBUPROFEN 200 MG
1 TABLET ORAL
Qty: 16 | Refills: 0
Start: 2024-01-09 | End: 2024-01-12

## 2024-01-09 RX ORDER — CYCLOBENZAPRINE HYDROCHLORIDE 10 MG/1
1 TABLET, FILM COATED ORAL
Qty: 15 | Refills: 0
Start: 2024-01-09 | End: 2024-01-13

## 2024-01-09 RX ORDER — IBUPROFEN 200 MG
1 TABLET ORAL
Qty: 20 | Refills: 0
Start: 2024-01-09 | End: 2024-01-13

## 2024-01-09 RX ORDER — FAMOTIDINE 10 MG/ML
1 INJECTION INTRAVENOUS
Qty: 14 | Refills: 0
Start: 2024-01-09 | End: 2024-01-22

## 2024-01-09 RX ORDER — LIDOCAINE 4 G/100G
1 CREAM TOPICAL
Qty: 1 | Refills: 0
Start: 2024-01-09 | End: 2024-01-13

## 2024-01-09 RX ORDER — LIDOCAINE 4 G/100G
1 CREAM TOPICAL
Qty: 1 | Refills: 0
Start: 2024-01-09 | End: 2024-03-02

## 2024-01-09 RX ORDER — DIAZEPAM 5 MG
5 TABLET ORAL ONCE
Refills: 0 | Status: DISCONTINUED | OUTPATIENT
Start: 2024-01-09 | End: 2024-01-09

## 2024-01-09 RX ADMIN — OXYCODONE HYDROCHLORIDE 5 MILLIGRAM(S): 5 TABLET ORAL at 02:42

## 2024-01-09 RX ADMIN — Medication 15 MILLIGRAM(S): at 09:48

## 2024-01-09 RX ADMIN — LIDOCAINE 1 PATCH: 4 CREAM TOPICAL at 00:47

## 2024-01-09 RX ADMIN — OXYCODONE HYDROCHLORIDE 5 MILLIGRAM(S): 5 TABLET ORAL at 15:07

## 2024-01-09 RX ADMIN — Medication 650 MILLIGRAM(S): at 09:48

## 2024-01-09 RX ADMIN — Medication 15 MILLIGRAM(S): at 10:40

## 2024-01-09 RX ADMIN — FAMOTIDINE 20 MILLIGRAM(S): 10 INJECTION INTRAVENOUS at 14:31

## 2024-01-09 RX ADMIN — OXYCODONE HYDROCHLORIDE 5 MILLIGRAM(S): 5 TABLET ORAL at 03:15

## 2024-01-09 RX ADMIN — Medication 5 MILLIGRAM(S): at 11:28

## 2024-01-09 RX ADMIN — Medication 650 MILLIGRAM(S): at 10:40

## 2024-01-09 NOTE — ED CDU PROVIDER SUBSEQUENT DAY NOTE - PROGRESS NOTE DETAILS
xray chest, left shoulder and left knee resulted without evidence of fracture. Pt is ambulatory in CDU without assistance. Rx for flexeril, IBU and tylenol provided through meds to beds. Rx for lidocaine patches, pepcid to be filled at pts [facility]. Pt reports improvement in symptoms, VSS, and she is well appearing at time of discharge. Pt will follow up with her spine doctor and will return to the ER with any worsening or concerning symptoms. xray chest, left shoulder and left knee resulted without evidence of fracture. Pt is ambulatory in CDU without assistance. Rx for flexeril, IBU and tylenol provided through meds to beds. Rx for lidocaine patches, pepcid to be filled at pts [facility]. Pt reports improvement in symptoms, VSS, and she is well appearing at time of discharge. Pt will follow up with her spine doctor and will return to the ER with any worsening or concerning symptoms. MRI resulted without evidence of acute fracture or spinal cord compression. Pt reassessed, c-collar removed, no c-spine tenderness, FROM of neck, strength 5/5 in all extremities, sensation intact and equal b/l. Pt reports chest pain left sided as well as left shoulder and left knee pain. Ordered for EKG and xrays xray chest, left shoulder and left knee resulted without evidence of fracture. Pt is ambulatory in CDU without assistance. Rx for flexeril, IBU and tylenol provided through meds to beds. Rx for lidocaine patches, pepcid to be filled at pts [facility]. Pt reports improvement in symptoms, VSS, and she is well appearing at time of discharge. Pt will follow up with her spine doctor and will return to the ER with any worsening or concerning symptoms.

## 2024-01-09 NOTE — ED CDU PROVIDER DISPOSITION NOTE - PATIENT PORTAL LINK FT
You can access the FollowMyHealth Patient Portal offered by [facility] by registering at the following website: [email or web address]. By joining [employer]’s FollowMyHealth portal, you will also be able to view your health information using other applications (apps) compatible with our system. You can access the FollowMyHealth Patient Portal offered by [facility] by registering at the following website: [email or web address]. By joining [email or web address]’s FollowMyHealth portal, you will also be able to view your health information using other applications (apps) compatible with our system. You can access the FollowMyHealth Patient Portal offered by [facility] by registering at the following website: [email or web address]. By joining [employer]’s FollowMyHealth portal, you will also be able to view your health information using other applications (apps) compatible with our system.
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
## 2024-01-09 NOTE — ED CDU PROVIDER DISPOSITION NOTE - NSFOLLOWUPINSTRUCTIONS_ED_ALL_ED_FT
Follow up with your spine specialist within 1 week, show copies of your MRI result  Follow-up with your primary care doctor within 1 week  Take ibuprofen 600 mg every 6-8 hours as needed for pain, take with food  – You can also take Tylenol 650 mg every 6 hours for pain  For muscle spasm take Flexeril 5 mg (1 tablet) every 8 hours, caution drowsiness do not drive or drink alcohol while taking  Apply Lidocaine Patch to affected area, leave patch on for 12 hours and then remove for 12 hours before applying new patch  Warm compress to area  Return to the ER with any worsening or concerning symptoms, increased pain, numbness, weakness, bowel or bladder incontinence or any other concerns.
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
## 2024-01-13 NOTE — ED PROVIDER NOTE - NS_EDPROVIDERDISPOUSERTYPE_ED_A_ED
Pt was called to remind them about TR services and their interest. Pt did not answer. Writer left a voicemail.   
Attending Attestation (For Attendings USE Only)...

## 2024-01-15 ENCOUNTER — NON-APPOINTMENT (OUTPATIENT)
Age: 44
End: 2024-01-15

## 2024-01-15 ENCOUNTER — APPOINTMENT (OUTPATIENT)
Dept: ORTHOPEDIC SURGERY | Facility: CLINIC | Age: 44
End: 2024-01-15
Payer: COMMERCIAL

## 2024-01-15 PROCEDURE — 99203 OFFICE O/P NEW LOW 30 MIN: CPT

## 2024-01-15 RX ORDER — METHYLPREDNISOLONE 4 MG/1
4 TABLET ORAL
Qty: 1 | Refills: 0 | Status: ACTIVE | COMMUNITY
Start: 2024-01-15 | End: 1900-01-01

## 2024-01-15 NOTE — PHYSICAL EXAM
[de-identified] : The patient appears well nourished  and in no apparent distress.  The patient is alert and oriented to person, place, and time.   Affect and mood appear normal.    The head is normocephalic and atraumatic.  The eyes reveal normal sclera and extra ocular muscles are intact.   The neck appears normal with no jugular venous distention or masses noted.   Skin shows normal turgor with no evidence of eczema or psoriasis.  No respiratory distress noted.  The patient ambulates with a normal gait.  The left shoulder has full range of motion.  There is mild discomfort with terminal range of motion.  There is no tenderness to palpation.  There is a negative impingement sign.  There is a negative Lemons sign.   Rotator cuff strength is normal.    There is no soft tissue swelling.  There is no eccyhmosis.  There is no warmth or erythema.    There is no instabililty on exam.  No lymphadenopathy or edema is noted.  Pulses and capillary refill are normal.  There is no muscular atrophy.  Strength and sensation are intact distally.    The left knee has normal range of motion without pain.  There are mild crepitations with range of motion.  There is no effusion.  There is no joint line tenderness .   There is a negative [facility] sign.  There is no soft tissue swelling, warmth, or erythema.   There is a negative Lachman sign.  There is no instability to varus/valgus stress.  There is no instability to anterior/posterior drawer.  There is normal strength  in the quadriceps and hamstring muscles.  Strength and sensation are intact distally.  There are normal pulses distally and good capillary refill.  No edema or lymphadenopathy noted.   [de-identified] : X-rays were reviewed from an outside facility.  3 views of the left shoulder were reviewed.  There is no fracture dislocation or degenerative disease.  3 views of the left knee were also reviewed.  There is no fracture dislocation or degenerative disease.
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
## 2024-01-31 ENCOUNTER — TRANSCRIPTION ENCOUNTER (OUTPATIENT)
Age: 44
End: 2024-01-31

## 2024-02-07 ENCOUNTER — APPOINTMENT (OUTPATIENT)
Dept: ORTHOPEDIC SURGERY | Facility: CLINIC | Age: 44
End: 2024-02-07
Payer: COMMERCIAL

## 2024-02-07 VITALS — SYSTOLIC BLOOD PRESSURE: 131 MMHG | HEART RATE: 71 BPM | DIASTOLIC BLOOD PRESSURE: 81 MMHG

## 2024-02-07 DIAGNOSIS — M25.559 PAIN IN UNSPECIFIED HIP: ICD-10-CM

## 2024-02-07 DIAGNOSIS — S73.102A UNSPECIFIED SPRAIN OF LEFT HIP, INITIAL ENCOUNTER: ICD-10-CM

## 2024-02-07 DIAGNOSIS — S80.02XA CONTUSION OF LEFT KNEE, INITIAL ENCOUNTER: ICD-10-CM

## 2024-02-07 DIAGNOSIS — S73.101A UNSPECIFIED SPRAIN OF RIGHT HIP, INITIAL ENCOUNTER: ICD-10-CM

## 2024-02-07 DIAGNOSIS — S43.402A UNSPECIFIED SPRAIN OF LEFT SHOULDER JOINT, INITIAL ENCOUNTER: ICD-10-CM

## 2024-02-07 PROCEDURE — 73521 X-RAY EXAM HIPS BI 2 VIEWS: CPT

## 2024-02-07 PROCEDURE — 99214 OFFICE O/P EST MOD 30 MIN: CPT

## 2024-02-10 PROBLEM — S80.02XA CONTUSION OF LEFT KNEE: Status: ACTIVE | Noted: 2024-01-15

## 2024-02-10 PROBLEM — S43.402A SPRAIN OF LEFT SHOULDER, UNSPECIFIED SHOULDER SPRAIN TYPE, INITIAL ENCOUNTER: Status: ACTIVE | Noted: 2024-01-15

## 2024-02-10 PROBLEM — S73.102A SPRAIN OF LEFT HIP, INITIAL ENCOUNTER: Status: ACTIVE | Noted: 2024-02-10

## 2024-02-10 NOTE — DISCUSSION/SUMMARY
[de-identified] : This patient presents today for follow-up regarding knee contusion and sprain of the left shoulder.  Again I recommended she follow-up with Dr. Bowles regarding her cervical spine issues as I do think her shoulder pain is coming from cervical spine origin.  Her contusion left knee continues to improve and should continue to improve over the next few weeks.  No further treatment needed for the knee.  Regards to her bilateral hip pain, I recommend an MRI of the right hip to evaluate for labral tearing and early osteoarthritis that she does have pain with decreased range of motion and a history of labral tearing.  She will follow-up with Dr. Alvarez after the MRI of the hip is completed for evaluation.  At least 30 minutes was spent performing the evaluation and management on today's office visit.  This includes but is not limited to preparing to see patient including review of any test results or outside medical records, obtaining and/or reviewing separately obtained history, performing examination and evaluation, counseling and educating the patient on their diagnosis and treatment recommendations, ordering medications, tests, or procedures, documenting clinical information in the electronic health record, independently interpreting results (not separately reported) and communicating results to the patient, and coordination of care.

## 2024-02-10 NOTE — PHYSICAL EXAM
[de-identified] : The patient appears well nourished  and in no apparent distress.  The patient is alert and oriented to person, place, and time.   Affect and mood appear normal.    The head is normocephalic and atraumatic.  The eyes reveal normal sclera and extra ocular muscles are intact.   The neck appears normal with no jugular venous distention or masses noted.   Skin shows normal turgor with no evidence of eczema or psoriasis.  No respiratory distress noted.  The patient ambulates with a normal gait.  Exam of the left knee reveals there is minimal tenderness over the patella.  Area of contusion has no evidence of any significant erythema or ecchymosis.  Her range of motion is satisfactory without pain.  No soft tissue swelling or ecchymosis.  Pulses are intact distally.  Capillary refill is normal. There is no edema or lymphadenopathy.  The left shoulder has full range of motion. There is mild discomfort with terminal range of motion. There is no tenderness to palpation. There is a negative impingement sign. There is a negative Lemons sign. Rotator cuff strength is normal. There is no soft tissue swelling. There is no eccyhmosis. There is no warmth or erythema. There is no instabililty on exam. No lymphadenopathy or edema is noted. Pulses and capillary refill are normal. There is no muscular atrophy. Strength and sensation are intact distally.  Examination of the hips reveals there is mild decreased internal and external rotation of the right hip with discomfort.  There is no soft tissue swelling.  Is no tenderness to palpation.  Has good strength about the hip.  The left hip has excellent range of motion with no significant pain.   [de-identified] : X-ray of the pelvis and frog lateral of the right and left hips reveals joint spaces are well-maintained.  No fracture dislocation or degenerative disease is noted.

## 2024-02-10 NOTE — HISTORY OF PRESENT ILLNESS
[de-identified] : This patient presents today for evaluation regarding bilateral hip pain right worse than the left.  She has had hip pain for quite some time now.  She has a history of a possible labral tear of the right hip a number of years ago.  Pain level is 7 out of 10.  Pain bothers her more in the right hip than the left hip.  Pain worse with ambulation and stair climbing.  Pain is improved with rest.  She also presents for follow-up regarding contusion to the left knee and left shoulder sprain.  Her knee and shoulder appear to be doing better after the last office visit where she was placed on a Medrol Dosepak.  I did think that a lot of her symptoms are coming from the cervical spine and recommended evaluation with Dr. Bowles for her cervical symptoms.

## 2024-02-28 DIAGNOSIS — M25.552 PAIN IN LEFT HIP: ICD-10-CM

## 2024-03-11 ENCOUNTER — APPOINTMENT (OUTPATIENT)
Dept: ORTHOPEDIC SURGERY | Facility: CLINIC | Age: 44
End: 2024-03-11
Payer: COMMERCIAL

## 2024-03-11 VITALS — SYSTOLIC BLOOD PRESSURE: 131 MMHG | HEART RATE: 72 BPM | DIASTOLIC BLOOD PRESSURE: 89 MMHG

## 2024-03-11 DIAGNOSIS — S83.242A OTHER TEAR OF MEDIAL MENISCUS, CURRENT INJURY, LEFT KNEE, INITIAL ENCOUNTER: ICD-10-CM

## 2024-03-11 PROCEDURE — 99204 OFFICE O/P NEW MOD 45 MIN: CPT
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
## 2024-03-11 NOTE — PHYSICAL EXAM
[de-identified] : Gen: NAD Resp: Nonlabored respirations, no SOB Gait: Nl   L Knee: Skin intact No effusion 0-130 AROM Tender MJL 5/5 IP Q EHL TA GS SILT L3-S1 2+ dp pt wwp bcr   1A lachman and (-) pivot shift Grade 1 posterior drawer Stable to v/v at 0 and 30 degrees (-) Dial test at 30, 90 degrees   (+) Korey, [place] Medial joint pain with shallow 2 leg squat   1+ patellar translation (-) pain with patellar compression/grind (-) J sign (-) apprehension   Gen: NAD Resp: Nonlabored respirations, no SOB Gait: nl  Side: Skin in tact Tenderness:   Hip ROM                               Flexion              Extension               IR               ER Affected               120                    10                         20              30 Normal                  120                    10                         25              40   Strength                              Flexion              Extension           Abduction        Adduction  Affected               5                        5                               5                     5                     Normal                  5                        5                               5                     5  Provocative Tests: Log roll  (-) FADIR   (+) CODY   (-) Rosemary   (-) Resisted SLR  (-)   [de-identified] : I independently reviewed and interpreted the MRI of the bl hips.  I discussed with the patient the MRI demonstrates bl labral tears, R rectus femoris partial tear, no significant OA.
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
## 2024-06-12 ENCOUNTER — APPOINTMENT (OUTPATIENT)
Dept: ORTHOPEDIC SURGERY | Facility: CLINIC | Age: 44
End: 2024-06-12

## 2024-06-17 ENCOUNTER — APPOINTMENT (OUTPATIENT)
Dept: ORTHOPEDIC SURGERY | Facility: CLINIC | Age: 44
End: 2024-06-17
Payer: COMMERCIAL

## 2024-06-17 DIAGNOSIS — M25.852 OTHER SPECIFIED JOINT DISORDERS, RIGHT HIP: ICD-10-CM

## 2024-06-17 DIAGNOSIS — S83.419A SPRAIN OF MEDIAL COLLATERAL LIGAMENT OF UNSPECIFIED KNEE, INITIAL ENCOUNTER: ICD-10-CM

## 2024-06-17 DIAGNOSIS — M25.851 OTHER SPECIFIED JOINT DISORDERS, RIGHT HIP: ICD-10-CM

## 2024-06-17 PROCEDURE — 99214 OFFICE O/P EST MOD 30 MIN: CPT

## 2024-06-17 RX ORDER — DICLOFENAC POTASSIUM 50 MG/1
50 TABLET, COATED ORAL 3 TIMES DAILY
Qty: 60 | Refills: 2 | Status: ACTIVE | COMMUNITY
Start: 2024-06-17 | End: 1900-01-01
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
## 2024-06-17 NOTE — PHYSICAL EXAM
[de-identified] : Gen: NAD Resp: Nonlabored respirations, no SOB Gait: Nl   L Knee: Skin intact No effusion 0-130 AROM Tender MJL 5/5 IP Q EHL TA GS SILT L3-S1 2+ dp pt wwp bcr   1A lachman and (-) pivot shift Grade 1 posterior drawer Stable to v/v at 0 and 30 degrees (-) Dial test at 30, 90 degrees   (+) KoreyLawanda jung   1+ patellar translation (-) pain with patellar compression/grind (-) J sign (-) apprehension   Gen: NAD Resp: Nonlabored respirations, no SOB Gait: nl  Side: Skin in tact Tenderness:   Hip ROM                               Flexion              Extension               IR               ER Affected               120                    10                         20              30 Normal                  120                    10                         25              40   Strength                              Flexion              Extension           Abduction        Adduction  Affected               5                        5                               5                     5                     Normal                  5                        5                               5                     5  Provocative Tests: Log roll  (-) FADIR   (+) CODY   (-) Rosemary   (-) Resisted SLR  (-)   [de-identified] : I independently reviewed and interpreted the MRI of the bl hips.  I discussed with the patient the MRI demonstrates bl labral tears, R rectus femoris partial tear, no significant OA.  I independently reviewed and interpreted the MRI of the knee.  I discussed with the patient the MRI demonstrates mcl sprain, no meniscus or ligamentous injury

## 2024-08-19 ENCOUNTER — APPOINTMENT (OUTPATIENT)
Dept: ORTHOPEDIC SURGERY | Facility: CLINIC | Age: 44
End: 2024-08-19
Payer: COMMERCIAL

## 2024-08-19 DIAGNOSIS — M25.461 EFFUSION, RIGHT KNEE: ICD-10-CM

## 2024-08-19 DIAGNOSIS — M25.462 EFFUSION, RIGHT KNEE: ICD-10-CM

## 2024-08-19 DIAGNOSIS — M25.851 OTHER SPECIFIED JOINT DISORDERS, RIGHT HIP: ICD-10-CM

## 2024-08-19 DIAGNOSIS — M25.852 OTHER SPECIFIED JOINT DISORDERS, RIGHT HIP: ICD-10-CM

## 2024-08-19 PROCEDURE — 20610 DRAIN/INJ JOINT/BURSA W/O US: CPT | Mod: LT

## 2024-08-19 PROCEDURE — 99214 OFFICE O/P EST MOD 30 MIN: CPT | Mod: 25

## 2024-08-19 RX ORDER — LIDOCAINE HYDROCHLORIDE 10 MG/ML
1 INJECTION, SOLUTION INFILTRATION; PERINEURAL
Refills: 0 | Status: COMPLETED | OUTPATIENT
Start: 2024-08-19

## 2024-08-19 RX ORDER — METHYLPRED ACET/NACL,ISO-OS/PF 40 MG/ML
40 VIAL (ML) INJECTION
Refills: 0 | Status: COMPLETED | OUTPATIENT
Start: 2024-08-19

## 2024-08-19 RX ADMIN — METHYLPREDNISOLONE ACETATE MG/ML: 40 INJECTION, SUSPENSION INTRA-ARTICULAR; INTRALESIONAL; INTRAMUSCULAR; SOFT TISSUE at 00:00

## 2024-08-19 RX ADMIN — LIDOCAINE HYDROCHLORIDE %: 10 INJECTION, SOLUTION INFILTRATION; PERINEURAL at 00:00

## 2024-08-19 NOTE — PHYSICAL EXAM
[de-identified] : Gen: NAD Resp: Nonlabored respirations, no SOB Gait: Nl   L Knee: Skin intact No effusion 0-130 AROM Tender MJL 5/5 IP Q EHL TA GS SILT L3-S1 2+ dp pt wwp bcr   1A lachman and (-) pivot shift Grade 1 posterior drawer Stable to v/v at 0 and 30 degrees (-) Dial test at 30, 90 degrees   (+) KoreyLawanda jung   1+ patellar translation (-) pain with patellar compression/grind (-) J sign (-) apprehension   Gen: NAD Resp: Nonlabored respirations, no SOB Gait: nl  Side: Skin in tact Tenderness:   Hip ROM                               Flexion              Extension               IR               ER Affected               120                    10                         20              30 Normal                  120                    10                         25              40   Strength                              Flexion              Extension           Abduction        Adduction  Affected               5                        5                               5                     5                     Normal                  5                        5                               5                     5  Provocative Tests: Log roll  (-) FADIR   (+) CODY   (-) Rosemary   (-) Resisted SLR  (-)   [de-identified] : I independently reviewed and interpreted the MRI of the bl hips.  I discussed with the patient the MRI demonstrates bl labral tears, R rectus femoris partial tear, no significant OA.  I independently reviewed and interpreted the MRI of the knee.  I discussed with the patient the MRI demonstrates mcl sprain, no meniscus or ligamentous injury
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
## 2024-08-26 ENCOUNTER — APPOINTMENT (OUTPATIENT)
Dept: ORTHOPEDIC SURGERY | Facility: CLINIC | Age: 44
End: 2024-08-26

## 2024-09-26 ENCOUNTER — APPOINTMENT (OUTPATIENT)
Dept: ORTHOPEDIC SURGERY | Facility: CLINIC | Age: 44
End: 2024-09-26
Payer: COMMERCIAL

## 2024-09-26 DIAGNOSIS — M70.70 OTHER BURSITIS OF HIP, UNSPECIFIED HIP: ICD-10-CM

## 2024-09-26 PROCEDURE — 99214 OFFICE O/P EST MOD 30 MIN: CPT | Mod: 25

## 2024-09-26 PROCEDURE — 20610 DRAIN/INJ JOINT/BURSA W/O US: CPT | Mod: LT

## 2024-09-26 RX ORDER — METHYLPRED ACET/NACL,ISO-OS/PF 40 MG/ML
40 VIAL (ML) INJECTION
Refills: 0 | Status: COMPLETED | OUTPATIENT
Start: 2024-09-26

## 2024-09-26 RX ORDER — LIDOCAINE HYDROCHLORIDE 10 MG/ML
1 INJECTION, SOLUTION INFILTRATION; PERINEURAL
Refills: 0 | Status: COMPLETED | OUTPATIENT
Start: 2024-09-26

## 2024-09-26 RX ADMIN — METHYLPREDNISOLONE ACETATE 1 MG/ML: 40 INJECTION, SUSPENSION INTRA-ARTICULAR; INTRALESIONAL; INTRAMUSCULAR; SOFT TISSUE at 00:00

## 2024-09-26 RX ADMIN — LIDOCAINE HYDROCHLORIDE 4 %: 10 INJECTION, SOLUTION INFILTRATION; PERINEURAL at 00:00

## 2024-09-26 NOTE — PHYSICAL EXAM
[de-identified] : Gen: NAD Resp: Nonlabored respirations, no SOB Gait: Nl   L Knee: Skin intact No effusion 0-130 AROM Tender MJL 5/5 IP Q EHL TA GS SILT L3-S1 2+ dp pt wwp bcr   1A lachman and (-) pivot shift Grade 1 posterior drawer Stable to v/v at 0 and 30 degrees (-) Dial test at 30, 90 degrees   (+) Lawanda Nair   1+ patellar translation (-) pain with patellar compression/grind (-) J sign (-) apprehension   Side: Skin in tact Tenderness: GT on L   Hip ROM                               Flexion              Extension               IR               ER Affected               120                    10                         20              30 Normal                  120                    10                         25              40   Strength                              Flexion              Extension           Abduction        Adduction  Affected               5                        5                               5                     5                     Normal                  5                        5                               5                     5  Provocative Tests: Log roll  (-) FADIR   (+) CODY   (-) Rosemary   (-) Resisted SLR  (-)   [de-identified] : I independently reviewed and interpreted the MRI of the bl hips.  I discussed with the patient the MRI demonstrates bl labral tears, R rectus femoris partial tear, no significant OA.  I independently reviewed and interpreted the MRI of the knee.  I discussed with the patient the MRI demonstrates mcl sprain, no meniscus or ligamentous injury
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
## 2024-11-07 ENCOUNTER — APPOINTMENT (OUTPATIENT)
Dept: ORTHOPEDIC SURGERY | Facility: CLINIC | Age: 44
End: 2024-11-07

## 2025-01-04 NOTE — ED ADULT NURSE NOTE - SUICIDE SCREENING QUESTION 1
Patient meeting expected goals this shift. Able to do all self cares and cares for . Patient does complain of nipple sorness, using hydrogel pads, nipple cream, and nipple shield. Education taught to patient and patient verbalized understanding. Plan to discharge tomorrow.   No Statement Selected

## 2025-06-13 ENCOUNTER — INPATIENT (INPATIENT)
Facility: HOSPITAL | Age: 45
LOS: 2 days | Discharge: HOME | End: 2025-06-16
Attending: HOSPITALIST | Admitting: HOSPITALIST
Payer: COMMERCIAL

## 2025-06-13 VITALS
DIASTOLIC BLOOD PRESSURE: 101 MMHG | SYSTOLIC BLOOD PRESSURE: 143 MMHG | TEMPERATURE: 98 F | HEIGHT: 64 IN | HEART RATE: 100 BPM | OXYGEN SATURATION: 100 % | RESPIRATION RATE: 17 BRPM | WEIGHT: 149.91 LBS

## 2025-06-13 DIAGNOSIS — Z98.1 ARTHRODESIS STATUS: Chronic | ICD-10-CM

## 2025-06-13 DIAGNOSIS — Z98.890 OTHER SPECIFIED POSTPROCEDURAL STATES: Chronic | ICD-10-CM

## 2025-06-13 DIAGNOSIS — Z98.89 OTHER SPECIFIED POSTPROCEDURAL STATES: Chronic | ICD-10-CM

## 2025-06-13 LAB
ADD ON TEST-SPECIMEN IN LAB: SIGNIFICANT CHANGE UP
ALBUMIN SERPL ELPH-MCNC: 4 G/DL — SIGNIFICANT CHANGE UP (ref 3.3–5)
ALP SERPL-CCNC: 52 U/L — SIGNIFICANT CHANGE UP (ref 40–120)
ALT FLD-CCNC: 12 U/L — SIGNIFICANT CHANGE UP (ref 4–33)
ANION GAP SERPL CALC-SCNC: 14 MMOL/L — SIGNIFICANT CHANGE UP (ref 7–14)
APTT BLD: 31.8 SEC — SIGNIFICANT CHANGE UP (ref 26.1–36.8)
AST SERPL-CCNC: 22 U/L — SIGNIFICANT CHANGE UP (ref 4–32)
BASOPHILS # BLD AUTO: 0.04 K/UL — SIGNIFICANT CHANGE UP (ref 0–0.2)
BASOPHILS NFR BLD AUTO: 1 % — SIGNIFICANT CHANGE UP (ref 0–2)
BILIRUB SERPL-MCNC: 0.3 MG/DL — SIGNIFICANT CHANGE UP (ref 0.2–1.2)
BLD GP AB SCN SERPL QL: NEGATIVE — SIGNIFICANT CHANGE UP
BUN SERPL-MCNC: 7 MG/DL — SIGNIFICANT CHANGE UP (ref 7–23)
CALCIUM SERPL-MCNC: 9.2 MG/DL — SIGNIFICANT CHANGE UP (ref 8.4–10.5)
CHLORIDE SERPL-SCNC: 106 MMOL/L — SIGNIFICANT CHANGE UP (ref 98–107)
CK MB BLD-MCNC: 0.8 % — SIGNIFICANT CHANGE UP (ref 0–2.5)
CK MB CFR SERPL CALC: 1.4 NG/ML — SIGNIFICANT CHANGE UP
CK SERPL-CCNC: 174 U/L — HIGH (ref 25–170)
CK SERPL-CCNC: 206 U/L — HIGH (ref 25–170)
CO2 SERPL-SCNC: 20 MMOL/L — LOW (ref 22–31)
CREAT SERPL-MCNC: 0.95 MG/DL — SIGNIFICANT CHANGE UP (ref 0.5–1.3)
EGFR: 74 ML/MIN/1.73M2 — SIGNIFICANT CHANGE UP
EGFR: 74 ML/MIN/1.73M2 — SIGNIFICANT CHANGE UP
EOSINOPHIL # BLD AUTO: 0.07 K/UL — SIGNIFICANT CHANGE UP (ref 0–0.5)
EOSINOPHIL NFR BLD AUTO: 1.7 % — SIGNIFICANT CHANGE UP (ref 0–6)
GLUCOSE SERPL-MCNC: 89 MG/DL — SIGNIFICANT CHANGE UP (ref 70–99)
HCG SERPL-ACNC: <1 MIU/ML — SIGNIFICANT CHANGE UP
HCT VFR BLD CALC: 39.5 % — SIGNIFICANT CHANGE UP (ref 34.5–45)
HGB BLD-MCNC: 12.9 G/DL — SIGNIFICANT CHANGE UP (ref 11.5–15.5)
IANC: 2.24 K/UL — SIGNIFICANT CHANGE UP (ref 1.8–7.4)
IMM GRANULOCYTES NFR BLD AUTO: 0.2 % — SIGNIFICANT CHANGE UP (ref 0–0.9)
INR BLD: 1.06 RATIO — SIGNIFICANT CHANGE UP (ref 0.85–1.16)
LIDOCAIN IGE QN: 40 U/L — SIGNIFICANT CHANGE UP (ref 7–60)
LYMPHOCYTES # BLD AUTO: 1.39 K/UL — SIGNIFICANT CHANGE UP (ref 1–3.3)
LYMPHOCYTES # BLD AUTO: 34.1 % — SIGNIFICANT CHANGE UP (ref 13–44)
MCHC RBC-ENTMCNC: 28.6 PG — SIGNIFICANT CHANGE UP (ref 27–34)
MCHC RBC-ENTMCNC: 32.7 G/DL — SIGNIFICANT CHANGE UP (ref 32–36)
MCV RBC AUTO: 87.6 FL — SIGNIFICANT CHANGE UP (ref 80–100)
MONOCYTES # BLD AUTO: 0.33 K/UL — SIGNIFICANT CHANGE UP (ref 0–0.9)
MONOCYTES NFR BLD AUTO: 8.1 % — SIGNIFICANT CHANGE UP (ref 2–14)
NEUTROPHILS # BLD AUTO: 2.24 K/UL — SIGNIFICANT CHANGE UP (ref 1.8–7.4)
NEUTROPHILS NFR BLD AUTO: 54.9 % — SIGNIFICANT CHANGE UP (ref 43–77)
NRBC # BLD AUTO: 0 K/UL — SIGNIFICANT CHANGE UP (ref 0–0)
NRBC # FLD: 0 K/UL — SIGNIFICANT CHANGE UP (ref 0–0)
NRBC BLD AUTO-RTO: 0 /100 WBCS — SIGNIFICANT CHANGE UP (ref 0–0)
NT-PROBNP SERPL-SCNC: 55 PG/ML — SIGNIFICANT CHANGE UP
PLATELET # BLD AUTO: 353 K/UL — SIGNIFICANT CHANGE UP (ref 150–400)
POTASSIUM SERPL-MCNC: 4.2 MMOL/L — SIGNIFICANT CHANGE UP (ref 3.5–5.3)
POTASSIUM SERPL-SCNC: 4.2 MMOL/L — SIGNIFICANT CHANGE UP (ref 3.5–5.3)
PROT SERPL-MCNC: 7 G/DL — SIGNIFICANT CHANGE UP (ref 6–8.3)
PROTHROM AB SERPL-ACNC: 12.3 SEC — SIGNIFICANT CHANGE UP (ref 9.9–13.4)
RBC # BLD: 4.51 M/UL — SIGNIFICANT CHANGE UP (ref 3.8–5.2)
RBC # FLD: 13.3 % — SIGNIFICANT CHANGE UP (ref 10.3–14.5)
RH IG SCN BLD-IMP: POSITIVE — SIGNIFICANT CHANGE UP
SODIUM SERPL-SCNC: 140 MMOL/L — SIGNIFICANT CHANGE UP (ref 135–145)
TROPONIN T, HIGH SENSITIVITY RESULT: <6 NG/L — SIGNIFICANT CHANGE UP
TROPONIN T, HIGH SENSITIVITY RESULT: <6 NG/L — SIGNIFICANT CHANGE UP
WBC # BLD: 4.08 K/UL — SIGNIFICANT CHANGE UP (ref 3.8–10.5)
WBC # FLD AUTO: 4.08 K/UL — SIGNIFICANT CHANGE UP (ref 3.8–10.5)

## 2025-06-13 PROCEDURE — 99223 1ST HOSP IP/OBS HIGH 75: CPT

## 2025-06-13 PROCEDURE — 99285 EMERGENCY DEPT VISIT HI MDM: CPT

## 2025-06-13 PROCEDURE — 71045 X-RAY EXAM CHEST 1 VIEW: CPT | Mod: 26

## 2025-06-13 RX ORDER — ATORVASTATIN CALCIUM 80 MG/1
80 TABLET, FILM COATED ORAL AT BEDTIME
Refills: 0 | Status: DISCONTINUED | OUTPATIENT
Start: 2025-06-13 | End: 2025-06-16

## 2025-06-13 RX ORDER — NAPROXEN SODIUM 275 MG
1 TABLET ORAL
Refills: 0 | DISCHARGE

## 2025-06-13 RX ORDER — ACETAMINOPHEN 500 MG/5ML
650 LIQUID (ML) ORAL EVERY 6 HOURS
Refills: 0 | Status: DISCONTINUED | OUTPATIENT
Start: 2025-06-13 | End: 2025-06-16

## 2025-06-13 RX ORDER — ASPIRIN 325 MG
81 TABLET ORAL DAILY
Refills: 0 | Status: DISCONTINUED | OUTPATIENT
Start: 2025-06-14 | End: 2025-06-16

## 2025-06-13 RX ORDER — SODIUM CHLORIDE 9 G/1000ML
1000 INJECTION, SOLUTION INTRAVENOUS
Refills: 0 | Status: DISCONTINUED | OUTPATIENT
Start: 2025-06-13 | End: 2025-06-16

## 2025-06-13 RX ORDER — ASPIRIN 325 MG
325 TABLET ORAL ONCE
Refills: 0 | Status: COMPLETED | OUTPATIENT
Start: 2025-06-13 | End: 2025-06-13

## 2025-06-13 RX ORDER — ONDANSETRON HCL/PF 4 MG/2 ML
4 VIAL (ML) INJECTION ONCE
Refills: 0 | Status: COMPLETED | OUTPATIENT
Start: 2025-06-13 | End: 2025-06-13

## 2025-06-13 RX ADMIN — Medication 4 MILLIGRAM(S): at 21:52

## 2025-06-13 RX ADMIN — Medication 1000 MILLILITER(S): at 17:31

## 2025-06-13 NOTE — ED ADULT TRIAGE NOTE - CHIEF COMPLAINT QUOTE
Pt was at Cardiologist, was having intermittent chest pain x 3 weeks, went to get a stress test had a near syncopal episode with dizziness and nausea. reports dizziness and nausea at this time. also has "buzzing in the head" with a headache and left sided chest pain. also reports left sided hand tingling and weakness. no sob, palpitations. arrives with 22 gauge in the left arm from MD office, received Zofran by EMS. FS 65  Pt provided juice will reassess.   MD Gant evaluated no code stroke called

## 2025-06-13 NOTE — ED PROVIDER NOTE - RAPID ASSESSMENT
I saw This patient in the ambulance bay briefly as a stroke code screening.  Patient had not eaten any food today in preparation for a treadmill stress test.  After she completed the stress test, felt lightheaded, had a witnessed syncopal event lasting a few seconds with rapid return to baseline mental status.  Currently feels lightheaded and diffuse tingling sensation but no focal neurologic deficits on my exam.  BGL 65 in ambulance bay, given apple juice and repeat BGL improved to 95. EKG NSR, no eo acute ischemia or malignant dysrhythmia, TWI in III, aVF, V3 unchanged vs last EKG on 1/9/24.

## 2025-06-13 NOTE — H&P ADULT - PROBLEM SELECTOR PLAN 3
VTE PPx: SCD  Nutrition: DASH/TLC Diet  Fluids: LR @ 100 cc/hr  Electrolytes: Maintain K>4, Mag >2, Phos > 3  Access: PIV  Code Status: FULL  Dispo: pending clinical improvement

## 2025-06-13 NOTE — H&P ADULT - NSHPLABSRESULTS_GEN_ALL_CORE
12.9   4.08  )-----------( 353      ( 13 Jun 2025 15:01 )             39.5     140  |  106  |  7   ----------------------------<  89     06-13  4.2   |  20[L]  |  0.95    Ca    9.2      13 Jun 2025 15:01    TPro  7.0  /  Alb  4.0  /  TBili  0.3  /  DBili  x   /  AST  22  /  ALT  12  /  AlkPhos  52  06-13    PT/INR: 12.3/1.06 (06-13-25 @ 15:01)  PTT: 31.8 (06-13-25 @ 15:01)              hs Troponin, T - <6 ng/L (06-13-25 @ 22:52)  hs Troponin, T - <6 ng/L (06-13-25 @ 15:01)      Pro-BNP: 55 (06-13-25 @ 15:01)

## 2025-06-13 NOTE — ED PROVIDER NOTE - WR ORDER DATE AND TIME 1
Hepatology Transfer of Care    Date:  3/8/2021    Referring Provider:  Ankur Kwok DO     CC/Reason for Consult: pancytopenia, hx of cirrhosis, unable to establish with outpatient hepatology    HPI:  Dayron Harkins is a 56 year old male with a PMHx significant for cirrhosis. His cirrhosis is complicated by portal hypertension, ascites, and esophageal varices.     Past medical history further significant for DM, HTN, CKD.     Patient presented to [facility] 3/7/21 due to Epistaxis. His Hgb was found to be 6.6. And he was transfused PRBC and was admitted for further evaluation and management.   Hepatology transfer of care due to history of cirrhosis. Patient has been unable to establish outpatient due to lack of insurance.   At time of consult patient reports feeling well. Discussed with assistance of interpretor. He states that he is now done drinking alcohol. Reports that his last drink was a \"few months ago\" and states that he knows it is the cause of his liver disease. Denies black or bloody stools. No confusion. Denies fevers, chills, and abdominal pain.     Patient Active Problem List    Diagnosis Date Noted   • Epistaxis 03/07/2021     Priority: Low   • Anemia 03/07/2021     Priority: Low   • Stage 3a chronic kidney disease (CMS/HCC) 10/22/2020     Priority: Low   • Pancytopenia (CMS/HCC) 10/21/2020     Priority: Low   • Cholelithiasis 10/21/2020     Priority: Low     asymptomatic     • Hyponatremia 04/17/2020     Priority: Low   • Blood in stool 09/28/2018     Priority: Low   • Pancytopenia (CMS/HCC) 12/06/2016     Priority: Low   • Esophageal varices without bleeding (CMS/HCC) 12/04/2016     Priority: Low   • Gastritis 12/04/2016     Priority: Low   • Portal hypertension (CMS/HCC) 12/04/2016     Priority: Low   • Hematemesis with nausea 12/03/2016     Priority: Low   • Cirrhosis of liver (CMS/HCC) 10/06/2016     Priority: Low   • Essential hypertension 10/06/2016     Priority: Low   • Diabetic foot ulcer  (CMS/[facility]) 10/05/2016     Priority: Low   • Hematuria, microscopic 10/05/2016     Priority: Low   • Renal stone 10/05/2016     Priority: Low   • Thrombocytopenia (CMS/HCC) 10/05/2016     Priority: Low   • Type 2 DM with diabetic neuropathy affecting both sides of body (CMS/HCC) 10/05/2016     Priority: Low     Past Medical History:   Diagnosis Date   • Cellulitis     leg   • Diabetes mellitus (CMS/[facility])    • Essential (primary) hypertension    • H/O esophageal varices     GI bleed   • Liver disease     cirrhosis   • Wears contact lenses    • Wears dentures      Past Surgical History:   Procedure Laterality Date   • Esophagogastroduodenoscopy  12/04/2016   • Esophagogastroduodenoscopy  11/08/2018    gastritis   • Esophagogastroduodenoscopy  10/22/2020    Dr. Chavez, EGD   • Esophagogastroduodenoscopy transoral flex w/band lig eso gastric varic  09/28/2018   • Foot/toes surgery proc unlisted Left     Unspecified foot/toes procedure- 2nd Toe Amp     Prior to Admission medications    Medication Sig Start Date End Date Taking? Authorizing Provider   fluticasone (FLONASE) 50 MCG/ACT nasal spray Spray 2 sprays in each nostril daily as needed (runny or congested nose).   Yes Outside Provider   pantoprazole (PROTONIX) 40 MG tablet Take 1 tablet by mouth 2 times daily. 2/16/21  Yes    lisinopril (ZESTRIL) 40 MG tablet Take 1 tablet by mouth daily. 8/27/20  Yes    metformin (GLUCOPHAGE) 1000 MG tablet Take 1 tablet by mouth 2 times daily. 8/27/20  Yes    insulin glargine (Lantus SoloStar) 100 UNIT/ML pen-injector Inject 10 Units into the skin at bedtime.  Patient taking differently: Inject 10 Units into the skin 2 times daily.  8/27/20  Yes    albuterol 108 (90 Base) MCG/ACT inhaler inhale 2 puffs into the lungs every 4 hours as needed for cough and wheezing 5/18/20  Yes    Insulin Lispro (HUMALOG KWIKPEN SC) Inject 10 Units into the skin 3 times daily.    Yes Outside Provider   lactulose (CHRONULAC) 10 GM/15ML solution Take 15  mLs by mouth 2 times daily. 10/26/20      tamsulosin (FLOMAX) 0.4 MG Cap Take 1 capsule by mouth daily. 10/26/20      hydrochlorothiazide (HYDRODIURIL) 25 MG tablet Take 1 tablet by mouth every morning. 8/27/20      atorvastatin (LIPITOR) 80 MG tablet Take 1 tablet by mouth daily. 8/27/20        ALLERGIES:  No Known Allergies  Social History     Tobacco Use   • Smoking status: Never Smoker   • Smokeless tobacco: Never Used   Substance Use Topics   • Alcohol use: Yes     Frequency: Never     Drinks per session: 1 or 2     Binge frequency: Never     Comment: Drinks \"much less now\" of beer on each Fri, Sa, Sun     Family History   Problem Relation Age of Onset   • Diabetes Mother      Review of Systems:  Constitutional:  negative for fevers, chills, and fatigue  Ears, nose, mouth, throat, and face: Positive for bloody nose. Negative for nasal congestion and sore throat  Respiratory:  negative for cough, shortness of breath, and dyspnea  Cardiovascular:  negative for chest pain, chest pressure, and irregular heart beat  Gastrointestinal:  negative for abdominal pain, constipation, diarrhea, hematemesis, hematochezia, melena, nausea and vomiting  Genitourinary:  negative for dysuria  Integument/Breasts:  negative for rash and skin lesion(s)  Musculoskeletal:  negative for muscle weakness and myalgias  Neurological:  negative for altered mental status, headache and dizziness    Objective:    Vitals:    03/08/21 0545   BP: (!) 156/77   Pulse: 76   Resp: 16   Temp: 98.3 °F (36.8 °C)     Physical Exam    General- Awake and alert in no distress.  Cooperative.  Well nourished.  Skin- Warm and dry to touch.  No rash or lesion.  No jaundice.   Head- Atraumatic.  Normocephalic.   Eyes- No scleral icterus.  EOMs intact.   Throat/Neck- Trachea midline.  CV- RRR.    Pulmonary- On RA. Normal respiratory effort.  Abdomen- Round, soft, and non-distended.  Non-tender to palpation.    Extremities-  No erythema.  No edema.   Neuro- No  focal deficits.  Strength and sensation grossly intact.  Speech is clear.  No asterixis.  Psych- Alert and oriented to person, place, and time.  Mood and affect appropriate.     Imaging/Diagnostics: Reviewed    Assessment/Plan:  1. Cirrhosis, likely EtOH.  MELD-NA 10  -- Last EtOH use \"months ago\" per patient. EtOH level 388 (2/5/21). EtOH abstinence encouraged.   -- Recommend MVI, folate, and thiamine.  -- Patient is not a transplant candidate at this time due to low Meld. Need further psychological treatment which patient is pursuing at [address] [place] [address].    2. Hepatic Encephalopathy.  Grade 0   -- HE is chronic, secondary to cirrhosis, and without hepatic coma   -- Continue Lactulose.    3. Portal HTN/Esophageal Varices/ Concern for GI bleed  -- Last EGD 10/22/20 with Portal hypertensive gastropathy and Medium-sized esophageal varices with no high-risk stigmata for bleeding, not banded  -- Hgb 6.8 this AM, received transfusion transfusion   - Recommend transfusion for Hgb < 7. Correct TOM PRN  -- Presented with Epistaxis.   -- EGD recently noted.  -- would recommend coreg for HTN and Portal hypertension      4. Portal HTN/Ascites   -- Last Paracentesis 10/21/20 with 1.5 L removed. Paracentesis PRN.   -- CT 2/5/21 with only small ascites.    -- 2g Na Diet    5. HCC Screening   -- Last AFP was <3 on 10/20/20   -- US liver with doppler 3/8/21 without focal mass    Plan discussed with patient with assistance of interpretor.     Nette Leggett PA-C  Abdominal Transplant & Hepatology  Pager [phone]  3/8/2021    After 3:30 PM please call the answering service and page the [facility] on call for the service.          I have seen, examined, and have evaluated this patient.  Please see above notes by the [facility] that I have reviewed and added to.   I attest that I performed the work for this encounter and agree with the above documentation completed.    D/W patient about alcohol cessation.  Will start low dose coreg for portal  hypertension.  Will follow up as outpatient        Shine Fuentes MD  Hepatology          13-Jun-2025 14:12

## 2025-06-13 NOTE — CHART NOTE - NSCHARTNOTEFT_GEN_A_CORE
Patient sent in from Dr Perez/Dr Albrecht office with persistent Hypotension and new TWI after nuclear stress test.  Admit to Dr Lauren Lang  Plan for Cardiac CT to eval for obstructive CAD    Shannan BRUNER  [phone]

## 2025-06-13 NOTE — H&P ADULT - PROBLEM SELECTOR PLAN 1
::DDx neurally mediated vasovagal syncope vs orthostatic hypotension from volume depletion/drug-induced/POTS/autonomic dysfunction; Cardiac syncope from arrhythmias or mechanical cause. No concerns for metabolic etiology like electrolyte derangement/anemia/hypoxia/adrenal insufficiency. Low concern for neurological (seizure/TIA/stroke/SAH etc.) or psychiatric etiology. Patient was also noted to have FS of 65 on arrival to the ED.   -Monitor on telemetry for continuous cardiac monitoring  -Trop <6 x2,  -> 174  -Fall and seizure precautions  -Orthostatic vitals ordered  -TSH, A1c, LDL to risk stratify or assess for CV risk factors  -TTE to evaluate for structural heart disease  -Neuro-check Q6H  -PT/OT eval   -Consider Holter/Event monitor at time of discharge  [ ] CT

## 2025-06-13 NOTE — ED PROVIDER NOTE - ATTENDING CONTRIBUTION TO CARE
45 female PTED from  Cardiologist after having  near syncopal episode with dizziness and nausea associated with "buzzing" in her head in the setting of chest pain and  left sided hand tingling during stress test today There was no sob, palpitations.   VSS FS 65   PE as documented  EKG Pending   Labs Pending  PLAN Given circumstances of presentation pt to be admitted to complete inpt workup

## 2025-06-13 NOTE — ED ADULT NURSE NOTE - NS PRO PASSIVE SMOKE EXP
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  She does have a past medical history significant for ulcerative colitis.  Treatment options were reviewed.   Plan: 1. Given her history of ulcerative colitis I have recommended use of topical diclofenac gel twice daily over the next 2 weeks.  Then as needed. 2. She was fit for a functional thumb spica brace to be used as needed. 3. Patient will follow-up with our hand specialist then 4 to 6 weeks.  We discussed that she may be candidate for a cortisone injection in the future should her symptoms justify.  All questions answered. No

## 2025-06-13 NOTE — H&P ADULT - HISTORY OF PRESENT ILLNESS
45F w/ PMHx of MVP, chronic back pain, s/p c-spine fusion, lumbar laminectomy and microdiscectomy who was referred to ED by cardiologist (Dr Perez/Ambrocio) for witnessed syncopal episode this afternoon. Patient completed a stress test and subsequently started feeling chest pressure with lightheadedness and then proceeded to have a syncopal episode for a couple seconds. No trauma or head strike. Patient was dizzy and nauseous and given IVF for hypotension. Denies any provocative factors other than exertion like eating, coughing, sneezing, swallowing, anxiety, pain, defecation, or micturition. Denies aura, tongue biting, incontinence, postictal state, or seizure activity. No PMHx of prior syncope, previous cardiac or neurologic disease. No FHx of SCD or syncope. No new medications or recent medication changes. Does not take vasodilators, negative chronotropes, psychoactive drugs, or insulin.

## 2025-06-13 NOTE — CHART NOTE - NSCHARTNOTEFT_GEN_A_CORE
Notified by RN patient complaining of chest pain this evening. Patient with Hx of chest pain ; admitted to the hospital sp syncopal episode at outpatient stress test. Patient seen and evaluated at bedside, states chest pain is 5/10 intensity non-radiating not associated with SOB. Patient states chest pain prior was more painful then chest pain at the present. States she has been feeling dizzy all day and still feels dizzy now. EKG and repeat cardiac enzymes ordered STAT. / 97 and denies fever/chills.     T(C): 36.9 (06-13-25 @ 20:17), Max: 36.9 (06-13-25 @ 16:16)  HR: 72 (06-13-25 @ 20:17) (72 - 100)  BP: 139/97 (06-13-25 @ 20:17) (139/97 - 144/91)  RR: 18 (06-13-25 @ 20:17) (16 - 18)  SpO2: 97% (06-13-25 @ 20:17) (97% - 100%)    Constitutional: No acute distress on exam, resting comfortably in bed   Neck: supple, no JVD  Respiratory: Clear to auscultation bilaterally. No wheezes, rales or rhonchi. Normal respiratory effort  Cardiovascular: regular rate and rhythm, S1 and S2, no murmurs, rubs or gallops, no edema, 2+ peripheral pulses  Gastrointestinal: soft, nontender, nondistended, +bowel sounds, no hernia  Skin: warm, dry, no rash  Neurologic: sensation grossly intact, CN grossly intact, non-focal exam    Assessment: 45-year-old female with a history of MVP, chronic back pain, chronic chest pain, who was referred to the ED by cardiologist, Dr Perez/Ambrocio, for witnessed syncopal episode this afternoon. Patient now being evaluated for chest pain and dizziness.     Chest pain:   - pain 5/10 non-radiating   - on admission EKG with TWI in lead V1 & V3   - Repeat EKG performed now -- with TWI in lead V1, II, & V3    - Repeat CE stat   - c/w tele monitoring   - Will continue to monitor Notified by RN patient complaining of chest pain this evening. Patient with Hx of chest pain ; admitted to the hospital sp syncopal episode at outpatient stress test. Patient seen and evaluated at bedside, states chest pain is 5/10 intensity non-radiating not associated with SOB. Patient states chest pain prior was more painful then chest pain at the present. States she has been feeling dizzy all day and still feels dizzy now. EKG and repeat cardiac enzymes ordered STAT. / 97 and denies fever/chills.     T(C): 36.9 (06-13-25 @ 20:17), Max: 36.9 (06-13-25 @ 16:16)  HR: 72 (06-13-25 @ 20:17) (72 - 100)  BP: 139/97 (06-13-25 @ 20:17) (139/97 - 144/91)  RR: 18 (06-13-25 @ 20:17) (16 - 18)  SpO2: 97% (06-13-25 @ 20:17) (97% - 100%)    Constitutional: No acute distress on exam, resting comfortably in bed   Neck: supple, no JVD  Respiratory: Clear to auscultation bilaterally. No wheezes, rales or rhonchi. Normal respiratory effort  Cardiovascular: regular rate and rhythm, S1 and S2, no murmurs, rubs or gallops, no edema, 2+ peripheral pulses  Gastrointestinal: soft, nontender, nondistended, +bowel sounds, no hernia  Skin: warm, dry, no rash  Neurologic: sensation grossly intact, CN grossly intact, non-focal exam    Assessment: 45-year-old female with a history of MVP, chronic back pain, chronic chest pain, who was referred to the ED by cardiologist, Dr Perez/Ambrocio, for witnessed syncopal episode this afternoon. Patient now being evaluated for chest pain and dizziness.     Chest pain:   - pain 5/10 non-radiating - not associated with SOB  - admits to nausea and dizziness   - on admission EKG with TWI in lead V1 & V3   - Repeat EKG performed now -- with TWI in lead V1, II, & V3. This shows new TWI in lead II   - Repeat CE stat   - IV Tylenol for pain control   - c/w tele monitoring   - Will continue to monitor Notified by RN patient complaining of chest pain this evening. Patient with Hx of chest pain ; admitted to the hospital sp syncopal episode at outpatient stress test. Patient seen and evaluated at bedside, states chest pain is 5/10 intensity non-radiating not associated with SOB. Patient states chest pain prior was more painful then chest pain at the present. States she has been feeling dizzy all day and still feels dizzy now. EKG and repeat cardiac enzymes ordered STAT. / 97 and denies fever/chills.     T(C): 36.9 (06-13-25 @ 20:17), Max: 36.9 (06-13-25 @ 16:16)  HR: 72 (06-13-25 @ 20:17) (72 - 100)  BP: 139/97 (06-13-25 @ 20:17) (139/97 - 144/91)  RR: 18 (06-13-25 @ 20:17) (16 - 18)  SpO2: 97% (06-13-25 @ 20:17) (97% - 100%)    Constitutional: No acute distress on exam, resting comfortably in bed   Neck: supple, no JVD  Respiratory: Clear to auscultation bilaterally. No wheezes, rales or rhonchi. Normal respiratory effort  Cardiovascular: regular rate and rhythm, S1 and S2, no murmurs, rubs or gallops, no edema, 2+ peripheral pulses  Gastrointestinal: soft, nontender, nondistended, +bowel sounds, no hernia  Skin: warm, dry, no rash  Neurologic: sensation grossly intact, CN grossly intact, non-focal exam    Assessment: 45-year-old female with a history of MVP, chronic back pain, chronic chest pain, who was referred to the ED by cardiologist, Dr Perez/Ambrocio, for witnessed syncopal episode this afternoon. Patient now being evaluated for chest pain and dizziness.     Chest pain:   - pain 5/10 non-radiating - not associated with SOB  - admits to nausea and dizziness   - on admission EKG with TWI in lead V1 & V3   - Repeat CE stat   - IV Tylenol for pain control   - c/w tele monitoring   - Will continue to monitor

## 2025-06-13 NOTE — ED PROVIDER NOTE - OBJECTIVE STATEMENT
45-year-old female with a history of MVP, chronic back pain, chronic chest pain, who was referred to the ED by cardiologist, Dr Perez/Ambrocio, for witnessed syncopal episode this afternoon.  Patient completed treadmill stress test, where she started feeling chest pressure with lightheadedness and then had a syncopal episode for a couple of seconds. Noted to be hypotensive. No trauma. In the ED, patient still complains of lightheadedness and mild nausea.  Received Zofran prior to arrival. Denies fever, chills, chest pain, shortness of breath, vomiting, leg pain or swelling.

## 2025-06-13 NOTE — H&P ADULT - ASSESSMENT
45F w/ PMHx of MVP, chronic back pain, s/p c-spine fusion, lumbar laminectomy and microdiscectomy who was referred to ED by cardiologist (Dr Perez/Ambrocio) for witnessed syncopal episode after stress test. Admitted to medicine for further management and monitoring. Detailed plan as below:

## 2025-06-13 NOTE — ED ADULT NURSE NOTE - PAIN: PRESENCE, MLM
DATE:  04/27/2019



NEUROLOGY CONSULT



CHIEF COMPLAINT:  Right side peripheral visual loss.



HISTORY OF PRESENT ILLNESS:  An 80-year-old woman who is well known to

me from the past with past medical history of hypertension, CHF, CVA

in the right occipital region in the past, history of lacunar infarct

associated in the right parietal, left parietal, left occipital and

right cerebellar regions superimposed underlying encephalomalacia in

the right occipital region 04/17/2018; history of diabetes mellitus,

diabetic peripheral neuropathy on gabapentin, who comes into the

hospital for difficulty with peripheral vision on the right side,

visual field defect based on neuro exam.  No focal weakness seen on

neuro exam.  No change in speech or taste.  CAT scan of the head

showed bilateral encephalomalacia in the right MCA and left PCA

territory.  No acute intracranial abnormalities.  MRI of the brain is

currently pending.



PAST MEDICAL HISTORY:  As above.



ALLERGIES:  ALLERGY NOTED TO HAVE HYDROMORPHONE, TRAMADOL, IODINE AND

SILVADENE.



REVIEW OF SYSTEMS:  A 14-point review of systems is negative except as

per the HPI.



SOCIAL HISTORY:  No illicit drug use, smoking, or EtOH abuse.



MEDICATIONS:  Reviewed by nurses' reconciliation sheet.



FAMILY HISTORY:  Noncontributory.



PHYSICAL EXAMINATION

GENERAL:  The patient is seen up in bed, in no acute distress.

VITAL SIGNS:  Temperature 97.5, pulse rate 74, blood pressure 120/74,

respiratory rate of 18.

HEENT:  Atraumatic and normocephalic.  PERRLA.  Extraocular muscles

are intact.

NECK:  Supple.  No JVD.  No adenopathy noted.

LUNGS:  Clear to auscultation.  No adventitious sounds.

HEART:  S1 and S2.  Normal rate and rhythm.  No murmurs, rubs or

gallops.

ABDOMEN:  Soft and nontender.  Nondistended.  Bowel sounds present.

EXTREMITIES:  No clubbing.  No cyanosis.  Peripheral pulses 2+ felt

bilaterally.

NEUROLOGIC:  The patient is alert and oriented to person, place,but

not year.  Speech is fluent without any errors.  Cranial nerves II

through XII are intact except for right visual field deficit seen on

neuro exam.  Motor exam; moves all extremities equally.  Moderate

reduced left finger tap when compared to the right.  Sensory exam;

decreased light touch to pinprick up to the calves bilaterally. 

Decreased vibration at the toes.  DTRs are 2+ throughout and 1 at both

knees and ankles.  Coordination; finger-to-nose is intact.  No

dysmetria noted.  Gait is deferred for now.



LABORATORY DATA:  Sodium is 142, potassium 3.3, chloride 104, carbon

dioxide 26, BUN of 30, creatinine 1.2, random glucose 94.



IMPRESSION:  This is an 79-year-old woman with past medical history of

an old right occipital encephalomalacia, old lacunar infarction of the

right parietal, left parietal, left occipital and right cerebellar

regions, which were embolic in nature in 04/2018, status post on

Eliquis and baby aspirin for stroke prevention, history of type 2

diabetes mellitus, diabetic peripheral neuropathy on gabapentin, came

in with new features of right-sided visual field defects, which is

right peripheral vision loss, which could be secondary to central

process versus peripheral eye injury.  No peripheral retinal or eye

defect from given that she is diabetic.



RECOMMENDATIONS:  At this time, we will recommend:

1.  MRI of the brain to assess for any new structural infarcts causing

right visual defect.

2.  Ophthalmology consult to assess for any peripheral causes for her

right visual defect.

3.  Continue aspirin 81 and Eliquis and Lipitor 40 for stroke

prevention.

4.  PT/OT evaluation and followup as needed.







__________________________________________

Chandan Pablo MD





DD:  04/27/2019 13:29:28

DT:  04/27/2019 14:18:13

Job # [id number] complains of pain/discomfort

## 2025-06-13 NOTE — H&P ADULT - PROBLEM SELECTOR PLAN 2
::Left sided chest pain without troponinemia; Hemodynamically stable. No evidence of volume overload or shock on exam. T wave inversion in lateral leads after stress test   -s/p . c/w ASA 81 mg QD  -Troponin: <6 x2  -CXR: No acute cardiopulmonary process   -pro-BNP 55   -TTE ordered to evaluate for new wall motion abnormalities  -Admit to Telemetry; cardiac monitor  -Atorvastatin 80 mg QHS   -Repeat ECG in the AM  -TSH, A1c, LDL to risk stratify or assess for CV risk factors   -Avoid NSAIDs except for ASA to limit risk of cardiovascular events  [ ] Coronary CTA to eval for obstructive CAD

## 2025-06-13 NOTE — H&P ADULT - NSHPREVIEWOFSYSTEMS_GEN_ALL_CORE
- CONSTITUTIONAL: Denies weight loss, fever and chills.  - HEENT: Denies changes in vision and hearing.  - RESPIRATORY: Denies SOB and cough.  - CV: As per HPI  - GI: Denies abdominal pain, nausea, vomiting and diarrhea.  - : Denies dysuria and urinary frequency.  - MSK: +chronic shoulder pain   - SKIN: Denies rash and pruritus.  - NEUROLOGICAL: +syncope.  - PSYCHIATRIC: Denies recent changes in mood. Denies anxiety and depression.    A 12-point review of systems was completed and is otherwise negative except as noted in the HPI.

## 2025-06-13 NOTE — ED PROVIDER NOTE - PROGRESS NOTE DETAILS
Labs showed no leukocytosis or severe anemia. No evidence of severe electrolyte abnormalities, liver disease, or renal disorder. No severe coagulopathy. Unremarkable lipase. Negative troponin. Negative pregnancy. CK mildly elevated at 206.

## 2025-06-13 NOTE — ED ADULT NURSE NOTE - NSFALLRISK_ED_ALL_ED
pt c/o mid low abdominal pain x a few days, took tums and omeprazole  A&Ox3, resp wnl, denies NVD
No

## 2025-06-13 NOTE — ED ADULT NURSE NOTE - OBJECTIVE STATEMENT
qiana rn: pt received to room #10. pt c/o dizziness and near syncopal episode s/p having a stress test today. pt states she had chest pain x 3 weeks prompting the stress test. states she currently dizzy, not room spinning, and nausea. pt aox4. pt also c/o "buzzing" in the head. Denies ah/vh. IV placed, labs drawn and sent pt pending attending eval. report given to primary RN.

## 2025-06-13 NOTE — ED PROVIDER NOTE - CLINICAL SUMMARY MEDICAL DECISION MAKING FREE TEXT BOX
45-year-old female with a history of MVP, chronic back pain, chronic chest pain, who was referred to the ED by cardiologist, Dr Perez/Ambrocio, for witnessed syncopal episode this afternoon.  Patient completed treadmill stress test where he started feeling chest pressure with lightheadedness and then had a syncopal episode for a couple of seconds. Noted to be hypotensive. No trauma. In the ED, patient still complains of lightheadedness and mild nausea.  Received Zofran prior to arrival. On exam, afebrile, mild tachycardia at 100bpm, saturating well on room air, /101. Regular rhythm. CTAB.  Benign abdomen.  No edema.  Good capillary refill.  Will obtain EKG labs chest x-ray.  Contacted by cardiology team for admission with plan for cardiac CT and further workup/management.

## 2025-06-13 NOTE — ED ADULT NURSE REASSESSMENT NOTE - NS ED NURSE REASSESS COMMENT FT1
PT is resting in stretcher, easily arousable to verbal stimuli. no apparent distress noted. pt NSR on the CM. pt denies complaints at this time. pt plan of care on going.

## 2025-06-14 DIAGNOSIS — Z29.9 ENCOUNTER FOR PROPHYLACTIC MEASURES, UNSPECIFIED: ICD-10-CM

## 2025-06-14 DIAGNOSIS — R55 SYNCOPE AND COLLAPSE: ICD-10-CM

## 2025-06-14 DIAGNOSIS — R07.9 CHEST PAIN, UNSPECIFIED: ICD-10-CM

## 2025-06-14 LAB
A1C WITH ESTIMATED AVERAGE GLUCOSE RESULT: 5.3 % — SIGNIFICANT CHANGE UP (ref 4–5.6)
ANION GAP SERPL CALC-SCNC: 10 MMOL/L — SIGNIFICANT CHANGE UP (ref 7–14)
BASOPHILS # BLD AUTO: 0.05 K/UL — SIGNIFICANT CHANGE UP (ref 0–0.2)
BASOPHILS NFR BLD AUTO: 1.2 % — SIGNIFICANT CHANGE UP (ref 0–2)
BUN SERPL-MCNC: 15 MG/DL — SIGNIFICANT CHANGE UP (ref 7–23)
CALCIUM SERPL-MCNC: 8.8 MG/DL — SIGNIFICANT CHANGE UP (ref 8.4–10.5)
CHLORIDE SERPL-SCNC: 106 MMOL/L — SIGNIFICANT CHANGE UP (ref 98–107)
CHOLEST SERPL-MCNC: 178 MG/DL — SIGNIFICANT CHANGE UP
CO2 SERPL-SCNC: 22 MMOL/L — SIGNIFICANT CHANGE UP (ref 22–31)
CREAT SERPL-MCNC: 1.21 MG/DL — SIGNIFICANT CHANGE UP (ref 0.5–1.3)
EGFR: 56 ML/MIN/1.73M2 — LOW
EGFR: 56 ML/MIN/1.73M2 — LOW
EOSINOPHIL # BLD AUTO: 0.18 K/UL — SIGNIFICANT CHANGE UP (ref 0–0.5)
EOSINOPHIL NFR BLD AUTO: 4.2 % — SIGNIFICANT CHANGE UP (ref 0–6)
ESTIMATED AVERAGE GLUCOSE: 105 — SIGNIFICANT CHANGE UP
GLUCOSE SERPL-MCNC: 91 MG/DL — SIGNIFICANT CHANGE UP (ref 70–99)
HCT VFR BLD CALC: 36.1 % — SIGNIFICANT CHANGE UP (ref 34.5–45)
HDLC SERPL-MCNC: 61 MG/DL — SIGNIFICANT CHANGE UP
HGB BLD-MCNC: 11.6 G/DL — SIGNIFICANT CHANGE UP (ref 11.5–15.5)
IANC: 1.74 K/UL — LOW (ref 1.8–7.4)
IMM GRANULOCYTES NFR BLD AUTO: 0 % — SIGNIFICANT CHANGE UP (ref 0–0.9)
LDLC SERPL-MCNC: 107 MG/DL — HIGH
LIPID PNL WITH DIRECT LDL SERPL: 107 MG/DL — HIGH
LYMPHOCYTES # BLD AUTO: 1.96 K/UL — SIGNIFICANT CHANGE UP (ref 1–3.3)
LYMPHOCYTES # BLD AUTO: 45.7 % — HIGH (ref 13–44)
MAGNESIUM SERPL-MCNC: 2 MG/DL — SIGNIFICANT CHANGE UP (ref 1.6–2.6)
MCHC RBC-ENTMCNC: 28.4 PG — SIGNIFICANT CHANGE UP (ref 27–34)
MCHC RBC-ENTMCNC: 32.1 G/DL — SIGNIFICANT CHANGE UP (ref 32–36)
MCV RBC AUTO: 88.5 FL — SIGNIFICANT CHANGE UP (ref 80–100)
MONOCYTES # BLD AUTO: 0.36 K/UL — SIGNIFICANT CHANGE UP (ref 0–0.9)
MONOCYTES NFR BLD AUTO: 8.4 % — SIGNIFICANT CHANGE UP (ref 2–14)
MRSA PCR RESULT.: SIGNIFICANT CHANGE UP
NEUTROPHILS # BLD AUTO: 1.74 K/UL — LOW (ref 1.8–7.4)
NEUTROPHILS NFR BLD AUTO: 40.5 % — LOW (ref 43–77)
NONHDLC SERPL-MCNC: 117 MG/DL — SIGNIFICANT CHANGE UP
NRBC # BLD AUTO: 0 K/UL — SIGNIFICANT CHANGE UP (ref 0–0)
NRBC # FLD: 0 K/UL — SIGNIFICANT CHANGE UP (ref 0–0)
NRBC BLD AUTO-RTO: 0 /100 WBCS — SIGNIFICANT CHANGE UP (ref 0–0)
PHOSPHATE SERPL-MCNC: 3.8 MG/DL — SIGNIFICANT CHANGE UP (ref 2.5–4.5)
PLATELET # BLD AUTO: 336 K/UL — SIGNIFICANT CHANGE UP (ref 150–400)
POTASSIUM SERPL-MCNC: 4.2 MMOL/L — SIGNIFICANT CHANGE UP (ref 3.5–5.3)
POTASSIUM SERPL-SCNC: 4.2 MMOL/L — SIGNIFICANT CHANGE UP (ref 3.5–5.3)
RBC # BLD: 4.08 M/UL — SIGNIFICANT CHANGE UP (ref 3.8–5.2)
RBC # FLD: 13.2 % — SIGNIFICANT CHANGE UP (ref 10.3–14.5)
S AUREUS DNA NOSE QL NAA+PROBE: SIGNIFICANT CHANGE UP
SODIUM SERPL-SCNC: 138 MMOL/L — SIGNIFICANT CHANGE UP (ref 135–145)
T4 FREE+ TSH PNL SERPL: 2.95 UIU/ML — SIGNIFICANT CHANGE UP (ref 0.27–4.2)
TRIGL SERPL-MCNC: 50 MG/DL — SIGNIFICANT CHANGE UP
VIT B12 SERPL-MCNC: 311 PG/ML — SIGNIFICANT CHANGE UP (ref 200–900)
WBC # BLD: 4.29 K/UL — SIGNIFICANT CHANGE UP (ref 3.8–10.5)
WBC # FLD AUTO: 4.29 K/UL — SIGNIFICANT CHANGE UP (ref 3.8–10.5)

## 2025-06-14 PROCEDURE — 70450 CT HEAD/BRAIN W/O DYE: CPT | Mod: 26

## 2025-06-14 RX ORDER — METOPROLOL SUCCINATE 50 MG/1
25 TABLET, EXTENDED RELEASE ORAL EVERY 6 HOURS
Refills: 0 | Status: DISCONTINUED | OUTPATIENT
Start: 2025-06-14 | End: 2025-06-14

## 2025-06-14 RX ORDER — METOPROLOL SUCCINATE 50 MG/1
25 TABLET, EXTENDED RELEASE ORAL EVERY 6 HOURS
Refills: 0 | Status: DISCONTINUED | OUTPATIENT
Start: 2025-06-14 | End: 2025-06-16

## 2025-06-14 RX ADMIN — METOPROLOL SUCCINATE 25 MILLIGRAM(S): 50 TABLET, EXTENDED RELEASE ORAL at 17:13

## 2025-06-14 RX ADMIN — Medication 325 MILLIGRAM(S): at 01:13

## 2025-06-14 RX ADMIN — Medication 1 APPLICATION(S): at 11:33

## 2025-06-14 RX ADMIN — ATORVASTATIN CALCIUM 80 MILLIGRAM(S): 80 TABLET, FILM COATED ORAL at 21:59

## 2025-06-14 RX ADMIN — SODIUM CHLORIDE 100 MILLILITER(S): 9 INJECTION, SOLUTION INTRAVENOUS at 01:13

## 2025-06-14 RX ADMIN — Medication 650 MILLIGRAM(S): at 01:13

## 2025-06-14 RX ADMIN — SODIUM CHLORIDE 100 MILLILITER(S): 9 INJECTION, SOLUTION INTRAVENOUS at 05:15

## 2025-06-14 RX ADMIN — METOPROLOL SUCCINATE 25 MILLIGRAM(S): 50 TABLET, EXTENDED RELEASE ORAL at 23:49

## 2025-06-14 NOTE — CONSULT NOTE ADULT - NS ATTEND AMEND GEN_ALL_CORE FT
Agree with plan as detailed in PA/NP Note.     Lauren Lang MD  Pager: [phone]  Office: [phone]

## 2025-06-14 NOTE — PATIENT PROFILE ADULT - NSPRONUTRITIONRISK_GEN_A_NUR
Type 2 diabetic, with better controlled, A1C is improving 7.5 done today  Reviewed goals of therapy are to get the best control we can without hypoglycemia  Routine health maintenance/screening: UTD Lipid, A1C, Urine P/C, Eye, feet   Family is concern about cost of Humalog  Did have 1 low, that woke him up at 2 AM, glucose was 60       Plan  - Adjust regiment: Increase Trulicity to 1.5mg Q weekly, will decrease to VGO20, plus 3 clicks with meal and 1-2 clicks for snack  - Advised to check glucose 3-4x a day and send logs for review soon  - Advise patient about hypoglycemia, treatment of hypoglycemia, pt is aware  - Repeat lab work A1C In 3 month  - Diabetic supplies reviewed no need for refills  - Close follow up 3  mo  - Advised that he follow up with eye exam  - consider SGLT2 in the future     No indicators present

## 2025-06-14 NOTE — PATIENT PROFILE ADULT - FALL HARM RISK - RISK INTERVENTIONS

## 2025-06-15 LAB
ANION GAP SERPL CALC-SCNC: 12 MMOL/L — SIGNIFICANT CHANGE UP (ref 7–14)
BASOPHILS # BLD AUTO: 0.05 K/UL — SIGNIFICANT CHANGE UP (ref 0–0.2)
BASOPHILS NFR BLD AUTO: 1.2 % — SIGNIFICANT CHANGE UP (ref 0–2)
BUN SERPL-MCNC: 12 MG/DL — SIGNIFICANT CHANGE UP (ref 7–23)
CALCIUM SERPL-MCNC: 9 MG/DL — SIGNIFICANT CHANGE UP (ref 8.4–10.5)
CHLORIDE SERPL-SCNC: 106 MMOL/L — SIGNIFICANT CHANGE UP (ref 98–107)
CO2 SERPL-SCNC: 21 MMOL/L — LOW (ref 22–31)
CREAT SERPL-MCNC: 1.1 MG/DL — SIGNIFICANT CHANGE UP (ref 0.5–1.3)
EGFR: 62 ML/MIN/1.73M2 — SIGNIFICANT CHANGE UP
EGFR: 62 ML/MIN/1.73M2 — SIGNIFICANT CHANGE UP
EOSINOPHIL # BLD AUTO: 0.25 K/UL — SIGNIFICANT CHANGE UP (ref 0–0.5)
EOSINOPHIL NFR BLD AUTO: 5.8 % — SIGNIFICANT CHANGE UP (ref 0–6)
GLUCOSE SERPL-MCNC: 82 MG/DL — SIGNIFICANT CHANGE UP (ref 70–99)
HCT VFR BLD CALC: 35.6 % — SIGNIFICANT CHANGE UP (ref 34.5–45)
HGB BLD-MCNC: 11.8 G/DL — SIGNIFICANT CHANGE UP (ref 11.5–15.5)
IANC: 1.68 K/UL — LOW (ref 1.8–7.4)
IMM GRANULOCYTES NFR BLD AUTO: 0.2 % — SIGNIFICANT CHANGE UP (ref 0–0.9)
LYMPHOCYTES # BLD AUTO: 1.96 K/UL — SIGNIFICANT CHANGE UP (ref 1–3.3)
LYMPHOCYTES # BLD AUTO: 45.4 % — HIGH (ref 13–44)
MAGNESIUM SERPL-MCNC: 2 MG/DL — SIGNIFICANT CHANGE UP (ref 1.6–2.6)
MCHC RBC-ENTMCNC: 29.1 PG — SIGNIFICANT CHANGE UP (ref 27–34)
MCHC RBC-ENTMCNC: 33.1 G/DL — SIGNIFICANT CHANGE UP (ref 32–36)
MCV RBC AUTO: 87.9 FL — SIGNIFICANT CHANGE UP (ref 80–100)
MONOCYTES # BLD AUTO: 0.37 K/UL — SIGNIFICANT CHANGE UP (ref 0–0.9)
MONOCYTES NFR BLD AUTO: 8.6 % — SIGNIFICANT CHANGE UP (ref 2–14)
NEUTROPHILS # BLD AUTO: 1.68 K/UL — LOW (ref 1.8–7.4)
NEUTROPHILS NFR BLD AUTO: 38.8 % — LOW (ref 43–77)
NRBC # BLD AUTO: 0 K/UL — SIGNIFICANT CHANGE UP (ref 0–0)
NRBC # FLD: 0 K/UL — SIGNIFICANT CHANGE UP (ref 0–0)
NRBC BLD AUTO-RTO: 0 /100 WBCS — SIGNIFICANT CHANGE UP (ref 0–0)
PHOSPHATE SERPL-MCNC: 3.6 MG/DL — SIGNIFICANT CHANGE UP (ref 2.5–4.5)
PLATELET # BLD AUTO: 329 K/UL — SIGNIFICANT CHANGE UP (ref 150–400)
POTASSIUM SERPL-MCNC: 4 MMOL/L — SIGNIFICANT CHANGE UP (ref 3.5–5.3)
POTASSIUM SERPL-SCNC: 4 MMOL/L — SIGNIFICANT CHANGE UP (ref 3.5–5.3)
RBC # BLD: 4.05 M/UL — SIGNIFICANT CHANGE UP (ref 3.8–5.2)
RBC # FLD: 13.2 % — SIGNIFICANT CHANGE UP (ref 10.3–14.5)
SODIUM SERPL-SCNC: 139 MMOL/L — SIGNIFICANT CHANGE UP (ref 135–145)
WBC # BLD: 4.32 K/UL — SIGNIFICANT CHANGE UP (ref 3.8–10.5)
WBC # FLD AUTO: 4.32 K/UL — SIGNIFICANT CHANGE UP (ref 3.8–10.5)

## 2025-06-15 RX ORDER — KETOROLAC TROMETHAMINE 30 MG/ML
15 INJECTION, SOLUTION INTRAMUSCULAR; INTRAVENOUS ONCE
Refills: 0 | Status: DISCONTINUED | OUTPATIENT
Start: 2025-06-15 | End: 2025-06-15

## 2025-06-15 RX ORDER — NAPROXEN SODIUM 275 MG
500 TABLET ORAL ONCE
Refills: 0 | Status: COMPLETED | OUTPATIENT
Start: 2025-06-15 | End: 2025-06-15

## 2025-06-15 RX ORDER — IBUPROFEN 200 MG
600 TABLET ORAL ONCE
Refills: 0 | Status: COMPLETED | OUTPATIENT
Start: 2025-06-15 | End: 2025-06-15

## 2025-06-15 RX ADMIN — Medication 650 MILLIGRAM(S): at 10:35

## 2025-06-15 RX ADMIN — METOPROLOL SUCCINATE 25 MILLIGRAM(S): 50 TABLET, EXTENDED RELEASE ORAL at 18:48

## 2025-06-15 RX ADMIN — KETOROLAC TROMETHAMINE 15 MILLIGRAM(S): 30 INJECTION, SOLUTION INTRAMUSCULAR; INTRAVENOUS at 14:26

## 2025-06-15 RX ADMIN — Medication 1 APPLICATION(S): at 11:08

## 2025-06-15 RX ADMIN — KETOROLAC TROMETHAMINE 15 MILLIGRAM(S): 30 INJECTION, SOLUTION INTRAMUSCULAR; INTRAVENOUS at 13:56

## 2025-06-15 RX ADMIN — Medication 81 MILLIGRAM(S): at 11:08

## 2025-06-15 RX ADMIN — Medication 650 MILLIGRAM(S): at 11:05

## 2025-06-15 RX ADMIN — METOPROLOL SUCCINATE 25 MILLIGRAM(S): 50 TABLET, EXTENDED RELEASE ORAL at 05:30

## 2025-06-15 RX ADMIN — METOPROLOL SUCCINATE 25 MILLIGRAM(S): 50 TABLET, EXTENDED RELEASE ORAL at 11:08

## 2025-06-15 RX ADMIN — Medication 500 MILLIGRAM(S): at 15:21

## 2025-06-15 RX ADMIN — ATORVASTATIN CALCIUM 80 MILLIGRAM(S): 80 TABLET, FILM COATED ORAL at 23:39

## 2025-06-15 RX ADMIN — METOPROLOL SUCCINATE 25 MILLIGRAM(S): 50 TABLET, EXTENDED RELEASE ORAL at 23:39

## 2025-06-15 RX ADMIN — Medication 500 MILLIGRAM(S): at 14:51

## 2025-06-15 NOTE — PHYSICAL THERAPY INITIAL EVALUATION ADULT - ADDITIONAL COMMENTS
Patient report lives with her kids in apartment, elevator access, ambulates with out assistive device.

## 2025-06-15 NOTE — PHYSICAL THERAPY INITIAL EVALUATION ADULT - PERTINENT HX OF CURRENT PROBLEM, REHAB EVAL
Patient is 45 year old female, PMHx of MVP, chronic back pain, s/p c-spine fusion, lumbar laminectomy and microdiscectomy who was referred to ED by cardiologist (Dr Perez/Ambrocio) for witnessed syncopal episode this afternoon.

## 2025-06-15 NOTE — PROVIDER CONTACT NOTE (OTHER) - REASON
Patient complaining of HA from morning time. No relief with PRN tylenol asking for something for breakthrough pain.
Patient refusing metoprolol

## 2025-06-15 NOTE — PROVIDER CONTACT NOTE (OTHER) - SITUATION
Patient complaining of HA from morning time. No relief with PRN tylenol asking for something for breakthrough pain. Patient complaining of HA from morning time. No relief with PRN tylenol asking for something for breakthrough pain. HA now 10/10.

## 2025-06-15 NOTE — PROVIDER CONTACT NOTE (OTHER) - ASSESSMENT
VSS see flowsheet. States pain is now 10/10 and radiating to her left side of body
Patient refusing metoprolol. VSS see flowsheet. states her HR and BP are normal and she doesn't need medication for it.

## 2025-06-16 ENCOUNTER — TRANSCRIPTION ENCOUNTER (OUTPATIENT)
Age: 45
End: 2025-06-16

## 2025-06-16 ENCOUNTER — RESULT REVIEW (OUTPATIENT)
Age: 45
End: 2025-06-16

## 2025-06-16 VITALS
SYSTOLIC BLOOD PRESSURE: 138 MMHG | DIASTOLIC BLOOD PRESSURE: 99 MMHG | TEMPERATURE: 98 F | RESPIRATION RATE: 18 BRPM | HEART RATE: 55 BPM | OXYGEN SATURATION: 99 %

## 2025-06-16 DIAGNOSIS — M54.2 CERVICALGIA: ICD-10-CM

## 2025-06-16 LAB
ANION GAP SERPL CALC-SCNC: 13 MMOL/L — SIGNIFICANT CHANGE UP (ref 7–14)
BASOPHILS # BLD AUTO: 0.06 K/UL — SIGNIFICANT CHANGE UP (ref 0–0.2)
BASOPHILS NFR BLD AUTO: 1.3 % — SIGNIFICANT CHANGE UP (ref 0–2)
BUN SERPL-MCNC: 16 MG/DL — SIGNIFICANT CHANGE UP (ref 7–23)
CALCIUM SERPL-MCNC: 8.9 MG/DL — SIGNIFICANT CHANGE UP (ref 8.4–10.5)
CHLORIDE SERPL-SCNC: 104 MMOL/L — SIGNIFICANT CHANGE UP (ref 98–107)
CO2 SERPL-SCNC: 20 MMOL/L — LOW (ref 22–31)
CREAT SERPL-MCNC: 1.09 MG/DL — SIGNIFICANT CHANGE UP (ref 0.5–1.3)
EGFR: 63 ML/MIN/1.73M2 — SIGNIFICANT CHANGE UP
EGFR: 63 ML/MIN/1.73M2 — SIGNIFICANT CHANGE UP
EOSINOPHIL # BLD AUTO: 0.2 K/UL — SIGNIFICANT CHANGE UP (ref 0–0.5)
EOSINOPHIL NFR BLD AUTO: 4.2 % — SIGNIFICANT CHANGE UP (ref 0–6)
GLUCOSE SERPL-MCNC: 89 MG/DL — SIGNIFICANT CHANGE UP (ref 70–99)
HCT VFR BLD CALC: 38.8 % — SIGNIFICANT CHANGE UP (ref 34.5–45)
HGB BLD-MCNC: 12.6 G/DL — SIGNIFICANT CHANGE UP (ref 11.5–15.5)
IANC: 1.89 K/UL — SIGNIFICANT CHANGE UP (ref 1.8–7.4)
IMM GRANULOCYTES NFR BLD AUTO: 0.2 % — SIGNIFICANT CHANGE UP (ref 0–0.9)
LYMPHOCYTES # BLD AUTO: 2.21 K/UL — SIGNIFICANT CHANGE UP (ref 1–3.3)
LYMPHOCYTES # BLD AUTO: 46.8 % — HIGH (ref 13–44)
MAGNESIUM SERPL-MCNC: 2.2 MG/DL — SIGNIFICANT CHANGE UP (ref 1.6–2.6)
MCHC RBC-ENTMCNC: 28.5 PG — SIGNIFICANT CHANGE UP (ref 27–34)
MCHC RBC-ENTMCNC: 32.5 G/DL — SIGNIFICANT CHANGE UP (ref 32–36)
MCV RBC AUTO: 87.8 FL — SIGNIFICANT CHANGE UP (ref 80–100)
MONOCYTES # BLD AUTO: 0.35 K/UL — SIGNIFICANT CHANGE UP (ref 0–0.9)
MONOCYTES NFR BLD AUTO: 7.4 % — SIGNIFICANT CHANGE UP (ref 2–14)
NEUTROPHILS # BLD AUTO: 1.89 K/UL — SIGNIFICANT CHANGE UP (ref 1.8–7.4)
NEUTROPHILS NFR BLD AUTO: 40.1 % — LOW (ref 43–77)
NRBC # BLD AUTO: 0 K/UL — SIGNIFICANT CHANGE UP (ref 0–0)
NRBC # FLD: 0 K/UL — SIGNIFICANT CHANGE UP (ref 0–0)
NRBC BLD AUTO-RTO: 0 /100 WBCS — SIGNIFICANT CHANGE UP (ref 0–0)
PHOSPHATE SERPL-MCNC: 3.4 MG/DL — SIGNIFICANT CHANGE UP (ref 2.5–4.5)
PLATELET # BLD AUTO: 330 K/UL — SIGNIFICANT CHANGE UP (ref 150–400)
POTASSIUM SERPL-MCNC: 4.2 MMOL/L — SIGNIFICANT CHANGE UP (ref 3.5–5.3)
POTASSIUM SERPL-SCNC: 4.2 MMOL/L — SIGNIFICANT CHANGE UP (ref 3.5–5.3)
RBC # BLD: 4.42 M/UL — SIGNIFICANT CHANGE UP (ref 3.8–5.2)
RBC # FLD: 13.1 % — SIGNIFICANT CHANGE UP (ref 10.3–14.5)
SODIUM SERPL-SCNC: 137 MMOL/L — SIGNIFICANT CHANGE UP (ref 135–145)
WBC # BLD: 4.72 K/UL — SIGNIFICANT CHANGE UP (ref 3.8–10.5)
WBC # FLD AUTO: 4.72 K/UL — SIGNIFICANT CHANGE UP (ref 3.8–10.5)

## 2025-06-16 PROCEDURE — 93306 TTE W/DOPPLER COMPLETE: CPT | Mod: 26

## 2025-06-16 PROCEDURE — 75574 CT ANGIO HRT W/3D IMAGE: CPT | Mod: 26

## 2025-06-16 PROCEDURE — 93356 MYOCRD STRAIN IMG SPCKL TRCK: CPT

## 2025-06-16 RX ORDER — ACETAMINOPHEN 500 MG/5ML
1000 LIQUID (ML) ORAL ONCE
Refills: 0 | Status: COMPLETED | OUTPATIENT
Start: 2025-06-16 | End: 2025-06-16

## 2025-06-16 RX ORDER — ASPIRIN 325 MG
1 TABLET ORAL
Qty: 30 | Refills: 0
Start: 2025-06-16 | End: 2025-07-15

## 2025-06-16 RX ORDER — POLYETHYLENE GLYCOL 3350 17 G/17G
17 POWDER, FOR SOLUTION ORAL
Refills: 0 | Status: DISCONTINUED | OUTPATIENT
Start: 2025-06-16 | End: 2025-06-16

## 2025-06-16 RX ORDER — ATORVASTATIN CALCIUM 80 MG/1
1 TABLET, FILM COATED ORAL
Qty: 30 | Refills: 0
Start: 2025-06-16 | End: 2025-07-15

## 2025-06-16 RX ORDER — NAPROXEN SODIUM 275 MG
500 TABLET ORAL ONCE
Refills: 0 | Status: COMPLETED | OUTPATIENT
Start: 2025-06-16 | End: 2025-06-16

## 2025-06-16 RX ORDER — MECLIZINE HCL 12.5 MG
1 TABLET ORAL
Qty: 42 | Refills: 0
Start: 2025-06-16 | End: 2025-06-29

## 2025-06-16 RX ORDER — MECLIZINE HCL 12.5 MG
12.5 TABLET ORAL EVERY 8 HOURS
Refills: 0 | Status: DISCONTINUED | OUTPATIENT
Start: 2025-06-16 | End: 2025-06-16

## 2025-06-16 RX ADMIN — Medication 1 APPLICATION(S): at 11:32

## 2025-06-16 RX ADMIN — Medication 400 MILLIGRAM(S): at 17:28

## 2025-06-16 RX ADMIN — METOPROLOL SUCCINATE 25 MILLIGRAM(S): 50 TABLET, EXTENDED RELEASE ORAL at 05:39

## 2025-06-16 RX ADMIN — Medication 500 MILLIGRAM(S): at 12:53

## 2025-06-16 RX ADMIN — Medication 81 MILLIGRAM(S): at 11:32

## 2025-06-16 RX ADMIN — Medication 12.5 MILLIGRAM(S): at 12:53

## 2025-06-16 NOTE — PROGRESS NOTE ADULT - SUBJECTIVE AND OBJECTIVE BOX
date of service 6/16/25    no events overnight      acetaminophen     Tablet .. 650 milliGRAM(s) Oral every 6 hours PRN  aspirin enteric coated 81 milliGRAM(s) Oral daily  atorvastatin 80 milliGRAM(s) Oral at bedtime  chlorhexidine 2% Cloths 1 Application(s) Topical daily  lactated ringers. 1000 milliLiter(s) IV Continuous <Continuous>  meclizine 12.5 milliGRAM(s) Oral every 8 hours PRN                            12.6   4.72  )-----------( 330      ( 16 Jun 2025 03:20 )             38.8     137  |  104  |  16  ----------------------------<  89  4.2   |  20[L]  |  1.09    Ca    8.9      16 Jun 2025 03:20  Phos  3.4     06-16  Mg     2.20     06-16    Creatinine Trend: 1.09<--, 1.10<--, 1.21<--, 0.95<--    CARDIAC MARKERS ( 13 Jun 2025 22:52 )  x     / x     / x     / x     / 1.4 ng/mL      T(C): 36.4 (06-16-25 @ 11:37), Max: 36.9 (06-15-25 @ 13:40)  HR: 55 (06-16-25 @ 11:37) (55 - 66)  BP: 138/99 (06-16-25 @ 11:37) (113/76 - 138/99)  RR: 18 (06-16-25 @ 11:37) (16 - 18)  SpO2: 99% (06-16-25 @ 11:37) (98% - 100%)  Wt(kg): --    I&O's Summary    15 Daniel 2025 07:01  -  16 Jun 2025 07:00  --------------------------------------------------------  IN: 600 mL / OUT: 0 mL / NET: 600 mL        Appearance: Normal	  HEENT:   Normal oral mucosa, PERRL, EOMI	  Lymphatic: No lymphadenopathy  Cardiovascular: Normal S1 S2, No JVD, No murmurs, No edema  Respiratory: Lungs clear to auscultation	  Psychiatry: A & O x 3, Mood & affect appropriate  Gastrointestinal:  Soft, Non-tender, + BS	  Skin: No rashes, No ecchymoses, No cyanosis	  Neurologic: Non-focal  Extremities: Normal range of motion, No clubbing, cyanosis or edema  Vascular: Peripheral pulses palpable 2+ bilaterally    TELEMETRY: 	  nsr     < from: CT Angio Heart and Coronaries w/ IV Cont (06.16.25 @ 10:01) >  IMPRESSION:    Normal coronary arteries.    < end of copied text >      ASSESSMENT/PLAN: 45F w/ PMHx of MVP, chronic back pain, s/p c-spine fusion, lumbar laminectomy and microdiscectomy . now with syncope post NST .     # syncope   - Tele stable  - cont ASA ,statin  - TTE in office 5/23/25 with Normal LV/RV function, no valvular disease  - suspect hypotension due to Regadenosen used for Pharm NST- has since resolved    # chest pain   - ACS ruled out  - cont ASA, statin    - Coronary CT with Ca score of 0 and normal coronary arteries  -no further inpatient work up planned    F/U with Dr Perez 6/20 at [phone]  DC home today    Shannan BRUNER  [phone]               
no events overnight        acetaminophen     Tablet .. 650 milliGRAM(s) Oral every 6 hours PRN  aspirin enteric coated 81 milliGRAM(s) Oral daily  atorvastatin 80 milliGRAM(s) Oral at bedtime  chlorhexidine 2% Cloths 1 Application(s) Topical daily  lactated ringers. 1000 milliLiter(s) IV Continuous <Continuous>  metoprolol tartrate 25 milliGRAM(s) Oral every 6 hours                            11.6   4.29  )-----------( 336      ( 14 Jun 2025 05:07 )             36.1       Hemoglobin: 11.6 g/dL (06-14 @ 05:07)  Hemoglobin: 12.9 g/dL (06-13 @ 15:01)      06-14    138  |  106  |  15  ----------------------------<  91  4.2   |  22  |  1.21    Ca    8.8      14 Jun 2025 05:07  Phos  3.8     06-14  Mg     2.00     06-14    TPro  7.0  /  Alb  4.0  /  TBili  0.3  /  DBili  x   /  AST  22  /  ALT  12  /  AlkPhos  52  06-13    Creatinine Trend: 1.21<--, 0.95<--    COAGS:     CARDIAC MARKERS ( 13 Jun 2025 22:52 )  x     / x     / x     / x     / 1.4 ng/mL        T(C): 36.8 (06-14-25 @ 23:40), Max: 36.9 (06-14-25 @ 22:11)  HR: 68 (06-14-25 @ 23:40) (61 - 68)  BP: 120/77 (06-14-25 @ 23:40) (116/80 - 153/62)  RR: 18 (06-14-25 @ 23:40) (17 - 18)  SpO2: 98% (06-14-25 @ 23:40) (98% - 100%)  Wt(kg): --    I&O's Summary    14 Jun 2025 07:01  -  15 Daniel 2025 05:15  --------------------------------------------------------  IN: 700 mL / OUT: 0 mL / NET: 700 mL      Appearance: Normal	  HEENT:   Normal oral mucosa, PERRL, EOMI	  Lymphatic: No lymphadenopathy  Cardiovascular: Normal S1 S2, No JVD, No murmurs, No edema  Respiratory: Lungs clear to auscultation	  Psychiatry: A & O x 3, Mood & affect appropriate  Gastrointestinal:  Soft, Non-tender, + BS	  Skin: No rashes, No ecchymoses, No cyanosis	  Neurologic: Non-focal  Extremities: Normal range of motion, No clubbing, cyanosis or edema  Vascular: Peripheral pulses palpable 2+ bilaterally    TELEMETRY: 	  nsr     ASSESSMENT/PLAN: 45F w/ PMHx of MVP, chronic back pain, s/p c-spine fusion, lumbar laminectomy and microdiscectomy . now with syncope post NST .     # syncope   - Tele stable overnight, cont tele   - trop noted   - echo ordered   - cont ASA ,statin    # chest pain   - cardiac markers neg   - cont ASA, statin   - cont tele   - plan for Coronary CTA monday 
EP Attending  HISTORY OF PRESENT ILLNESS: HPI:  45F w/ PMHx of MVP, chronic back pain, s/p c-spine fusion, lumbar laminectomy and microdiscectomy who was referred to ED by cardiologist (Dr Perez/Shane) for witnessed syncopal episode this afternoon. Patient completed a stress test and subsequently started feeling chest pressure with lightheadedness and then proceeded to have a syncopal episode for a couple seconds. No trauma or head strike. Patient was dizzy and nauseous and given IVF for hypotension. Denies any provocative factors other than exertion like eating, coughing, sneezing, swallowing, anxiety, pain, defecation, or micturition. Denies aura, tongue biting, incontinence, postictal state, or seizure activity. No PMHx of prior syncope, previous cardiac or neurologic disease. No FHx of SCD or syncope. No new medications or recent medication changes. Does not take vasodilators, negative chronotropes, psychoactive drugs, or insulin.   (13 Jun 2025 20:43)    Patient has history of spine surgeries, cannot do a treadmills tress test.  Was able to walk flat-ground treadmill while getting Lexiscan infusion.  Began to feel lightheaded and sweaty on the treadmill, and symptoms worsened after completion of walking protocol.  Felt dizzy, near-fainting, and despite getting oral caffeine and IV aminophylline, she continued to lose consciousness on recovery gurney.  Even after recovery, systolic and diastolic BP were ~20+mmHg lower than baseline, and new T-wave inversions appeared on ECG. She was referred to the hospital via EMS to complete an ischemic workup.  At this time, no longer fatigued or lightheaded, feels back to baseline.  No prior fainting episodes or intolerance to IV mediations.  A 10 pt ROS is otherwise negative.    Date of service 6/15- resting in bed, awaiting CCTA tomorrow. HR 50s-60s.    PAST MEDICAL & SURGICAL HISTORY:  MVP (Mitral Valve Prolapse)  Umbilical Hernia  Herniated Cervical Disc  Carpal Tunnel Syndrome  B/L  Chronic back pain  Acute anal fissure  Chest pain  chronic c/o chest pain, followed by Dr. Perez. Recent cardiac workup 2017 negative.  History of laminectomy  2013  History of umbilical hernia repair  History of tonsillectomy and adenoidectomy  S/P lumbar fusion  S/P rotator cuff repair  History of arthroplasty    acetaminophen     Tablet .. 650 milliGRAM(s) Oral every 6 hours PRN  aspirin enteric coated 81 milliGRAM(s) Oral daily  atorvastatin 80 milliGRAM(s) Oral at bedtime  chlorhexidine 2% Cloths 1 Application(s) Topical daily  lactated ringers. 1000 milliLiter(s) IV Continuous <Continuous>  metoprolol tartrate 25 milliGRAM(s) Oral every 6 hours                            11.8   4.32  )-----------( 329      ( 15 Daniel 2025 05:21 )             35.6       06-15    139  |  106  |  12  ----------------------------<  82  4.0   |  21[L]  |  1.10    Ca    9.0      15 Daniel 2025 05:21  Phos  3.6     06-15  Mg     2.00     06-15    TPro  7.0  /  Alb  4.0  /  TBili  0.3  /  DBili  x   /  AST  22  /  ALT  12  /  AlkPhos  52  06-13    CARDIAC MARKERS ( 13 Jun 2025 22:52 )  x     / x     / x     / x     / 1.4 ng/mL      T(C): 36.8 (06-15-25 @ 11:05), Max: 36.9 (06-14-25 @ 22:11)  HR: 66 (06-15-25 @ 11:05) (57 - 68)  BP: 117/82 (06-15-25 @ 11:05) (117/82 - 153/62)  RR: 18 (06-15-25 @ 11:05) (17 - 18)  SpO2: 98% (06-15-25 @ 11:05) (97% - 100%)  Wt(kg): --    I&O's Summary    14 Jun 2025 07:01  -  15 Daniel 2025 07:00  --------------------------------------------------------  IN: 700 mL / OUT: 0 mL / NET: 700 mL    15 Daniel 2025 07:01  -  15 Daniel 2025 14:06  --------------------------------------------------------  IN: 350 mL / OUT: 0 mL / NET: 350 mL    General: Well nourished, no acute distress, alert and oriented x 3  Head: normocephalic, no trauma  Neck: no JVD, no bruit, supple, not enlarged  CV: S1S2, no S3, regular rate, rhythm is SINUS, no murmurs.    Lungs: clear BL, no rales or wheezes  Abdomen: bowel sounds +, soft, nontender, nondistended  Extremities: no clubbing, cyanosis or edema  Neuro: Moves all 4 extremities, sensation intact x 4 extremities  Skin: warm and moist, normal turgor  Psych: Mood and affect are appropriate for circumstances  MSK: normal range of motion and strength x4 extremities.    TELEMETRY: NSR, 60s.	    ECG:  NSR, TWI from (office)	  Echo: normal biventricular size and function, normal valves (office, 2025)	    ASSESSMENT/PLAN: Ms Barnard is a very pleasant 45y Female sent to hospital after experiencing profound side effects during stress testing.  (She was initially referred for stress testing due to exertional chest pain. Workup should be completed inpatient)  May be related to regadenoson infusion, or an indicator of underlying significant CAD. She did not complete nuclear imaging at the office.  Will add beta blockers to keep heartrate <65bpm, and plan fo coronary CTA on Monday.  She can take these intermittently or refuse doses as needed.  OK to stop IV fluids once reliably taking PO.  Will follow with you.    Davon Glynn M.D.  Cardiac Electrophysiology    office [phone]  pager [phone]
EP Attending  HISTORY OF PRESENT ILLNESS: HPI:  45F w/ PMHx of MVP, chronic back pain, s/p c-spine fusion, lumbar laminectomy and microdiscectomy who was referred to ED by cardiologist (Dr Perez/Shane) for witnessed syncopal episode this afternoon. Patient completed a stress test and subsequently started feeling chest pressure with lightheadedness and then proceeded to have a syncopal episode for a couple seconds. No trauma or head strike. Patient was dizzy and nauseous and given IVF for hypotension. Denies any provocative factors other than exertion like eating, coughing, sneezing, swallowing, anxiety, pain, defecation, or micturition. Denies aura, tongue biting, incontinence, postictal state, or seizure activity. No PMHx of prior syncope, previous cardiac or neurologic disease. No FHx of SCD or syncope. No new medications or recent medication changes. Does not take vasodilators, negative chronotropes, psychoactive drugs, or insulin.   (13 Jun 2025 20:43)    Patient has history of spine surgeries, cannot do a treadmills tress test.  Was able to walk flat-ground treadmill while getting Lexiscan infusion.  Began to feel lightheaded and sweaty on the treadmill, and symptoms worsened after completion of walking protocol.  Felt dizzy, near-fainting, and despite getting oral caffeine and IV aminophylline, she continued to lose consciousness on recovery gurney.  Even after recovery, systolic and diastolic BP were ~20+mmHg lower than baseline, and new T-wave inversions appeared on ECG. She was referred to the hospital via EMS to complete an ischemic workup.  At this time, no longer fatigued or lightheaded, feels back to baseline.  No prior fainting episodes or intolerance to IV mediations.  A 10 pt ROS is otherwise negative.    Date of service 6/16- good CCTA result.  pending DC home.    PAST MEDICAL & SURGICAL HISTORY:  MVP (Mitral Valve Prolapse)  Umbilical Hernia  Herniated Cervical Disc  Carpal Tunnel Syndrome  B/L  Chronic back pain  Acute anal fissure  Chest pain  chronic c/o chest pain, followed by Dr. Perez. Recent cardiac workup 2017 negative.  History of laminectomy  2013  History of umbilical hernia repair  History of tonsillectomy and adenoidectomy  S/P lumbar fusion  S/P rotator cuff repair  History of arthroplasty    acetaminophen     Tablet .. 650 milliGRAM(s) Oral every 6 hours PRN  aspirin enteric coated 81 milliGRAM(s) Oral daily  atorvastatin 80 milliGRAM(s) Oral at bedtime  chlorhexidine 2% Cloths 1 Application(s) Topical daily  lactated ringers. 1000 milliLiter(s) IV Continuous <Continuous>  metoprolol tartrate 25 milliGRAM(s) Oral every 6 hours                            12.6   4.72  )-----------( 330      ( 16 Jun 2025 03:20 )             38.8       06-16    137  |  104  |  16  ----------------------------<  89  4.2   |  20[L]  |  1.09    Ca    8.9      16 Jun 2025 03:20  Phos  3.4     06-16  Mg     2.20     06-16        CARDIAC MARKERS ( 13 Jun 2025 22:52 )  x     / x     / x     / x     / 1.4 ng/mL        T(C): 36.4 (06-16-25 @ 05:37), Max: 36.9 (06-15-25 @ 13:40)  HR: 60 (06-16-25 @ 05:37) (56 - 66)  BP: 125/82 (06-16-25 @ 05:37) (113/76 - 127/92)  RR: 16 (06-16-25 @ 05:37) (16 - 18)  SpO2: 100% (06-16-25 @ 05:37) (98% - 100%)  Wt(kg): --    I&O's Summary    15 Daniel 2025 07:01  -  16 Jun 2025 07:00  --------------------------------------------------------  IN: 600 mL / OUT: 0 mL / NET: 600 mL        General: Well nourished, no acute distress, alert and oriented x 3  Head: normocephalic, no trauma  Neck: no JVD, no bruit, supple, not enlarged  CV: S1S2, no S3, regular rate, rhythm is SINUS, no murmurs.    Lungs: clear BL, no rales or wheezes  Abdomen: bowel sounds +, soft, nontender, nondistended  Extremities: no clubbing, cyanosis or edema  Neuro: Moves all 4 extremities, sensation intact x 4 extremities  Skin: warm and moist, normal turgor  Psych: Mood and affect are appropriate for circumstances  MSK: normal range of motion and strength x4 extremities.    TELEMETRY: NSR, 60s.	    ECG:  NSR, TWI from (office)	  Echo: normal biventricular size and function, normal valves (office, 2025)	    ASSESSMENT/PLAN: Ms Barnard is a very pleasant 45y Female sent to hospital after experiencing profound side effects during stress testing.  (She was initially referred for stress testing due to exertional chest pain. Workup should be completed inpatient)  May be related to regadenoson infusion, or an indicator of underlying significant CAD. She did not complete nuclear imaging at the office.  Good CCTA results.  Does not need beta blockers.  Discharge home.    Davon Glynn M.D.  Cardiac Electrophysiology    office [phone]  pager [phone]

## 2025-06-16 NOTE — DISCHARGE NOTE PROVIDER - NSDCMRMEDTOKEN_GEN_ALL_CORE_FT
naproxen 500 mg oral delayed release tablet: 1 tab(s) orally 2 times a day   aspirin 81 mg oral delayed release tablet: 1 tab(s) orally once a day  atorvastatin 80 mg oral tablet: 1 tab(s) orally once a day (at bedtime)  meclizine 12.5 mg oral tablet: 1 tab(s) orally every 8 hours as needed for Dizziness  naproxen 500 mg oral delayed release tablet: 1 tab(s) orally 2 times a day

## 2025-06-16 NOTE — CONSULT NOTE ADULT - ASSESSMENT
45F w/ PMHx of MVP, chronic back pain, s/p c-spine fusion, lumbar laminectomy and microdiscectomy who was referred to Emergency Department  by cardiologist for witnessed syncopal episode this afternoon

## 2025-06-16 NOTE — DISCHARGE NOTE PROVIDER - HOSPITAL COURSE
45F w/ PMHx of MVP, chronic back pain, s/p c-spine fusion, lumbar laminectomy and microdiscectomy . now with syncope post NST .     Syncope   - Tele stable  - cont ASA ,statin  - TTE in office 5/23/25 with Normal LV/RV function, no valvular disease  - suspect hypotension due to Regadenosen used for Pharm NST- has since resolved    Chest pain   - ACS ruled out  - cont ASA, statin    - Coronary CT with Ca score of 0 and normal coronary arteries  -no further inpatient work up planned    F/U with Dr Perez 6/20 at [phone]    Case discussed with Dr. De Guzman and Dr. Lang on 6/16/25.  Patient is medically stable and optimized for discharge as per attending. All medications were reviewed and prescriptions were sent to a mutually agreed upon pharmacy. Discharge plan reviewed with patient and/or family.

## 2025-06-16 NOTE — DISCHARGE NOTE PROVIDER - PROVIDER TOKENS
PROVIDER:[TOKEN:[[id number]],SCHEDULEDAPPT:[06/20/2025],SCHEDULEDAPPTTIME:[12:00 PM]],PROVIDER:[TOKEN:[[id number]]],PROVIDER:[TOKEN:[[id number]]]

## 2025-06-16 NOTE — CONSULT NOTE ADULT - SUBJECTIVE AND OBJECTIVE BOX
HISTORY OF PRESENT ILLNESS:  45F w/ PMHx of MVP, chronic back pain, s/p c-spine fusion, lumbar laminectomy and microdiscectomy who was referred to ED by cardiologist (Dr Perez/Ambrocio) for witnessed syncopal episode this afternoon. Patient completed a stress test and subsequently started feeling chest pressure with lightheadedness and then proceeded to have a syncopal episode for a couple seconds. No trauma or head strike. Patient was dizzy and nauseous and given IVF for hypotension. Denies any provocative factors other than exertion like eating, coughing, sneezing, swallowing, anxiety, pain, defecation, or micturition. Denies aura, tongue biting, incontinence, postictal state, or seizure activity. No PMHx of prior syncope, previous cardiac or neurologic disease. No FHx of SCD or syncope. No new medications or recent medication changes. Does not take vasodilators, negative chronotropes, psychoactive drugs, or insulin.      PAST MEDICAL & SURGICAL HISTORY:  MVP (Mitral Valve Prolapse)      Umbilical Hernia      Herniated Cervical Disc      Carpal Tunnel Syndrome  B/L      Chronic back pain      Acute anal fissure      Chest pain  chronic c/o chest pain, followed by Dr. Perez. Recent cardiac workup 2017 negative.      History of laminectomy  2013      History of umbilical hernia repair      History of tonsillectomy and adenoidectomy      S/P lumbar fusion      S/P rotator cuff repair      History of arthroplasty          [ ] Diabetes   [ ] Hypertension  [ ] Hyperlipidemia  [ ] CAD  [ ] PCI  [ ] CABG    PREVIOUS DIAGNOSTIC TESTING:    [ ] Echocardiogram:  [ ]  Catheterization:  [ ] Stress Test:  	    MEDICATIONS:  aspirin enteric coated 81 milliGRAM(s) Oral daily        acetaminophen     Tablet .. 650 milliGRAM(s) Oral every 6 hours PRN      atorvastatin 80 milliGRAM(s) Oral at bedtime    chlorhexidine 2% Cloths 1 Application(s) Topical daily  lactated ringers. 1000 milliLiter(s) IV Continuous <Continuous>      Allergies    shellfish (Other)  terbutaline (Unknown)  seafood- chest tightness (Unknown)  Soy (Pruritus)    Intolerances        FAMILY HISTORY:  Family history of asthma (Sibling)  (brother)    Family history of heart disease  (mother)        SOCIAL HISTORY:    [ ] Non-smoker  [ ] Smoker  [ ] Alcohol      REVIEW OF SYSTEMS:  [ ]chest pain  [  ]shortness of breath  [  ]palpitations  [  ]syncope  [ ]near syncope [  ]diplopia  [  ]altered mental status   [  ]fevers  [ ]chills [ ]nausea  [ ]vomitting  [ ]abdominal pain  [ ]melena  [ ]BRBPR  [  ]epistaxis  [  ]rash  [  ]lower extremity edema      CONSTITUTIONAL: No fever, weight loss, or fatigue  EYES: No eye pain, visual disturbances, or discharge  ENMT:  No difficulty hearing, tinnitus, vertigo; No sinus or throat pain  NECK: No pain or stiffness  RESPIRATORY: No cough, wheezing, chills or hemoptysis; No Shortness of Breath  CARDIOVASCULAR: No chest pain, palpitations, passing out, dizziness, or leg swelling  GASTROINTESTINAL: No abdominal or epigastric pain. No nausea, vomiting, or hematemesis; No diarrhea or constipation. No melena or hematochezia.  GENITOURINARY: No dysuria, frequency, hematuria, or incontinence  NEUROLOGICAL: No headaches, memory loss, loss of strength, numbness, or tremors  SKIN: No itching, burning, rashes, or lesions   LYMPH Nodes: No enlarged glands  ENDOCRINE: No heat or cold intolerance; No hair loss  MUSCULOSKELETAL: No joint pain or swelling; No muscle, back, or extremity pain  PSYCHIATRIC: No depression, anxiety, mood swings, or difficulty sleeping  HEME/LYMPH: No easy bruising, or bleeding gums  ALLERY AND IMMUNOLOGIC: No hives or eczema	    [ ] All others negative	  [ ] Unable to obtain    PHYSICAL EXAM:  T(C): 36.7 (06-14-25 @ 05:15), Max: 36.9 (06-13-25 @ 16:16)  HR: 65 (06-14-25 @ 05:15) (65 - 100)  BP: 135/91 (06-14-25 @ 05:15) (121/86 - 144/91)  RR: 17 (06-14-25 @ 05:15) (16 - 18)  SpO2: 99% (06-14-25 @ 05:15) (97% - 100%)  Wt(kg): --  I&O's Summary      Appearance: Normal	  HEENT:   Normal oral mucosa, PERRL, EOMI	  Lymphatic: No lymphadenopathy  Cardiovascular: Normal S1 S2, No JVD, No murmurs, No edema  Respiratory: Lungs clear to auscultation	  Psychiatry: A & O x 3, Mood & affect appropriate  Gastrointestinal:  Soft, Non-tender, + BS	  Skin: No rashes, No ecchymoses, No cyanosis	  Neurologic: Non-focal  Extremities: Normal range of motion, No clubbing, cyanosis or edema  Vascular: Peripheral pulses palpable 2+ bilaterally    TELEMETRY: 	  nsr   ECG:  	NSR 78 , NAD   RADIOLOGY:  OTHER: 	  	  LABS:	 	    CARDIAC MARKERS:                                  12.9   4.08  )-----------( 353      ( 13 Jun 2025 15:01 )             39.5     06-13    140  |  106  |  7   ----------------------------<  89  4.2   |  20[L]  |  0.95    Ca    9.2      13 Jun 2025 15:01    TPro  7.0  /  Alb  4.0  /  TBili  0.3  /  DBili  x   /  AST  22  /  ALT  12  /  AlkPhos  52  06-13    proBNP:   Lipid Profile:   HgA1c:   TSH:     ASSESSMENT/PLAN: 45F w/ PMHx of MVP, chronic back pain, s/p c-spine fusion, lumbar laminectomy and microdiscectomy . now with syncope post NST .     # syncope   - Tele stable overnight, cont tele   - trop noted   - echo ordered   - cont ASA ,statin    # chest pain   - cardiac markers neg   - cont ASA, statin   - ecg with no acute finding,   - cont tele   - echo orered   - ct angio of heart / coronary pending      
EP Attending  HISTORY OF PRESENT ILLNESS: HPI:  45F w/ PMHx of MVP, chronic back pain, s/p c-spine fusion, lumbar laminectomy and microdiscectomy who was referred to ED by cardiologist (Dr Perez/Shane) for witnessed syncopal episode this afternoon. Patient completed a stress test and subsequently started feeling chest pressure with lightheadedness and then proceeded to have a syncopal episode for a couple seconds. No trauma or head strike. Patient was dizzy and nauseous and given IVF for hypotension. Denies any provocative factors other than exertion like eating, coughing, sneezing, swallowing, anxiety, pain, defecation, or micturition. Denies aura, tongue biting, incontinence, postictal state, or seizure activity. No PMHx of prior syncope, previous cardiac or neurologic disease. No FHx of SCD or syncope. No new medications or recent medication changes. Does not take vasodilators, negative chronotropes, psychoactive drugs, or insulin.   (13 Jun 2025 20:43)    Patient has history of spine surgeries, cannot do a treadmills tress test.  Was able to walk flat-ground treadmill while getting Lexiscan infusion.  Began to feel lightheaded and sweaty on the treadmill, and symptoms worsened after completion of walking protocol.  Felt dizzy, near-fainting, and despite getting oral caffeine and IV aminophylline, she continued to lose consciousness on recovery gurney.  Even after recovery, systolic and diastolic BP were ~20+mmHg lower than baseline, and new T-wave inversions appeared on ECG. She was referred to the hospital via EMS to complete an ischemic workup.  At this time, no longer fatigued or lightehaded, feels back to baseline.  No prior fainting episodes or intolerance to IV mediations.  A 10 pt ROS is otherwise negative.      PAST MEDICAL & SURGICAL HISTORY:  MVP (Mitral Valve Prolapse)  Umbilical Hernia  Herniated Cervical Disc  Carpal Tunnel Syndrome  B/L  Chronic back pain  Acute anal fissure  Chest pain  chronic c/o chest pain, followed by Dr. Perez. Recent cardiac workup 2017 negative.  History of laminectomy  2013  History of umbilical hernia repair  History of tonsillectomy and adenoidectomy  S/P lumbar fusion  S/P rotator cuff repair  History of arthroplasty    MEDICATIONS  (STANDING):  aspirin enteric coated 81 milliGRAM(s) Oral daily  atorvastatin 80 milliGRAM(s) Oral at bedtime  chlorhexidine 2% Cloths 1 Application(s) Topical daily  lactated ringers. 1000 milliLiter(s) (100 mL/Hr) IV Continuous <Continuous>    Allergies    shellfish (Other)  terbutaline (Unknown)  seafood- chest tightness (Unknown)  Soy (Pruritus)    Intolerances      FAMILY HISTORY:  Family history of asthma (Sibling)  (brother)    Family history of heart disease  (mother)      Non-contributary for premature coronary disease or sudden cardiac death    SOCIAL HISTORY:    [x ] Non-smoker  [ ] Smoker  [ ] Alcohol      PHYSICAL EXAM:  T(C): 36.4 (06-14-25 @ 11:15), Max: 36.9 (06-13-25 @ 16:16)  HR: 63 (06-14-25 @ 11:15) (63 - 100)  BP: 116/80 (06-14-25 @ 11:15) (116/80 - 144/91)  RR: 17 (06-14-25 @ 11:15) (16 - 18)  SpO2: 99% (06-14-25 @ 11:15) (97% - 100%)  Wt(kg): --    General: Well nourished, no acute distress, alert and oriented x 3  Head: normocephalic, no trauma  Neck: no JVD, no bruit, supple, not enlarged  CV: S1S2, no S3, regular rate, rhythm is SINUS, no murmurs.    Lungs: clear BL, no rales or wheezes  Abdomen: bowel sounds +, soft, nontender, nondistended  Extremities: no clubbing, cyanosis or edema  Neuro: Moves all 4 extremities, sensation intact x 4 extremities  Skin: warm and moist, normal turgor  Psych: Mood and affect are appropriate for circumstances  MSK: normal range of motion and strength x4 extremities.      TELEMETRY: NSR, 60s.	    ECG:  NSR, TWI from (office)	  Echo: normal biventricular size and function, normal valves (office, 2025)	  	  LABS:	 	                          11.6   4.29  )-----------( 336      ( 14 Jun 2025 05:07 )             36.1     06-14    138  |  106  |  15  ----------------------------<  91  4.2   |  22  |  1.21    Ca    8.8      14 Jun 2025 05:07  Phos  3.8     06-14  Mg     2.00     06-14    TPro  7.0  /  Alb  4.0  /  TBili  0.3  /  DBili  x   /  AST  22  /  ALT  12  /  AlkPhos  52  06-13      ASSESSMENT/PLAN: Ms Barnard is a very pleasant 45y Female sent to hospital after experiencing profound side effects during stress testing.  (She was initially referred for stress testing due to exertional chest pain. Workup should be completed inpatient)  May be related to regadenoson infusion, or an indicator of underlying significant CAD. She did not complete nuclear imaging at the office.  Will add beta blockers to keep heartrate <65bpm, and plan fo coronary CTA on Monday.  OK to stop IV fluids once reliably taking PO.  Will follow with you.    Davon Glynn M.D.  Cardiac Electrophysiology    office [phone]  pager [phone]
DATE OF SERVICE: 06-16-25 @ 14:03    Patient is a 45y old  Female who presents with a chief complaint of Syncope (16 Jun 2025 13:09)      HPI:  45F w/ PMHx of MVP, chronic back pain, s/p c-spine fusion, lumbar laminectomy and microdiscectomy who was referred to ED by cardiologist (Dr Perez/Ambrocio) for witnessed syncopal episode this afternoon. Patient completed a stress test and subsequently started feeling chest pressure with lightheadedness and then proceeded to have a syncopal episode for a couple seconds. No trauma or head strike. Patient was dizzy and nauseous and given IVF for hypotension. Denies any provocative factors other than exertion like eating, coughing, sneezing, swallowing, anxiety, pain, defecation, or micturition. Occasional buzzing in head      PAST MEDICAL & SURGICAL HISTORY:  MVP (Mitral Valve Prolapse)      Umbilical Hernia      Herniated Cervical Disc      Carpal Tunnel Syndrome  B/L      Chronic back pain      Acute anal fissure      Chest pain  chronic c/o chest pain, followed by Dr. Perez. Recent cardiac workup 2017 negative.      History of laminectomy  2013      History of umbilical hernia repair      History of tonsillectomy and adenoidectomy      S/P lumbar fusion      S/P rotator cuff repair      History of arthroplasty          Review of Systems:   CONSTITUTIONAL: No fever, weight loss, or fatigue  EYES: No eye pain, visual disturbances, or discharge  ENMT:  No difficulty hearing, tinnitus, vertigo; No sinus or throat pain  NECK: No pain or stiffness  RESPIRATORY: No cough, wheezing, chills or hemoptysis; No shortness of breath  CARDIOVASCULAR: No chest pain, palpitations, dizziness, leg swelling or sob  GASTROINTESTINAL: No abdominal pain. No nausea, vomiting, diarrhea or constipation  GENITOURINARY: No dysuria, frequency, hematuria, or incontinence  NEUROLOGICAL: No headaches, memory loss, loss of strength, numbness, or tremors  see above HPI     Allergies    shellfish (Other)  terbutaline (Unknown)  seafood- chest tightness (Unknown)  Soy (Pruritus)    Intolerances        Social History:     FAMILY HISTORY:  Family history of asthma (Sibling)  (brother)    Family history of heart disease  (mother)        MEDICATIONS  (STANDING):  aspirin enteric coated 81 milliGRAM(s) Oral daily  atorvastatin 80 milliGRAM(s) Oral at bedtime  chlorhexidine 2% Cloths 1 Application(s) Topical daily  lactated ringers. 1000 milliLiter(s) (100 mL/Hr) IV Continuous <Continuous>    MEDICATIONS  (PRN):  acetaminophen     Tablet .. 650 milliGRAM(s) Oral every 6 hours PRN Temp greater or equal to 38C (100.4F), Mild Pain (1 - 3)  meclizine 12.5 milliGRAM(s) Oral every 8 hours PRN Dizziness      CAPILLARY BLOOD GLUCOSE        I&O's Summary    15 Daniel 2025 07:01  -  16 Jun 2025 07:00  --------------------------------------------------------  IN: 600 mL / OUT: 0 mL / NET: 600 mL        24hrs Vital:  T(C): 36.4 (06-16-25 @ 11:37), Max: 36.9 (06-15-25 @ 18:40)  HR: 55 (06-16-25 @ 11:37) (55 - 66)  BP: 138/99 (06-16-25 @ 11:37) (113/76 - 138/99)  RR: 18 (06-16-25 @ 11:37) (16 - 18)  SpO2: 99% (06-16-25 @ 11:37) (98% - 100%)    PHYSICAL EXAM:  GENERAL: NAD, well-developed  HEAD:  Atraumatic, Normocephalic  EYES: EOMI, PERRLA, conjunctiva and sclera clear  NECK: Supple, No JVD  CHEST/LUNG: Clear to auscultation bilaterally; No wheeze  HEART: S1S2; No rubs, or gallops, no murmurs  ABDOMEN: Soft, Nontender; Bowel sounds present  EXTREMITIES:  + Peripheral Pulses, No clubbing or cyanosis, no edema  PSYCH: AO x 3,   NEUROLOGY: Alert, no focal motor or sensory deficits  SKIN: No rashes or lesions    LABS:                        12.6   4.72  )-----------( 330      ( 16 Jun 2025 03:20 )             38.8     06-16    137  |  104  |  16  ----------------------------<  89  4.2   |  20[L]  |  1.09    Ca    8.9      16 Jun 2025 03:20  Phos  3.4     06-16  Mg     2.20     06-16            Urinalysis Basic - ( 16 Jun 2025 03:20 )    Color: x / Appearance: x / SG: x / pH: x  Gluc: 89 mg/dL / Ketone: x  / Bili: x / Urobili: x   Blood: x / Protein: x / Nitrite: x   Leuk Esterase: x / RBC: x / WBC x   Sq Epi: x / Non Sq Epi: x / Bacteria: x        RADIOLOGY & ADDITIONAL TESTS:    Consultant(s) Notes Reviewed:      Care Discussed with Consultants/Other Providers:

## 2025-06-16 NOTE — PROGRESS NOTE ADULT - NS ATTEND AMEND GEN_ALL_CORE FT
Agree with plan as detailed in PA/NP Note.     Lauren Lang MD  Pager: [phone]  Office: [phone]
Patient seen and examined. Agree with plan as detailed in PA/NP Note.     Coronary CT with Ca score of 0 and normal coronary arteries no further inpatient work up planned    Suchet Rickey MAX  Pager: [phone]  Office: [phone]

## 2025-06-16 NOTE — DISCHARGE NOTE PROVIDER - NSDCCPCAREPLAN_GEN_ALL_CORE_FT
PRINCIPAL DISCHARGE DIAGNOSIS  Diagnosis: Syncope  Assessment and Plan of Treatment: You had a CT scan of your head which was unremarkable. You had a CT scan of your coronary arteries in your heart which showed no blockages. Please follow-up with your Cardiologist at your scheduled appointment 6/20

## 2025-06-16 NOTE — CONSULT NOTE ADULT - NSCONSULTADDITIONALINFOA_GEN_ALL_CORE
discussed with patient, expresses understanding of treatment plans.  discussed with covering ACP  discussed with cardiology

## 2025-06-16 NOTE — DISCHARGE NOTE NURSING/CASE MANAGEMENT/SOCIAL WORK - PATIENT PORTAL LINK FT
You can access the FollowMyHealth Patient Portal offered by [facility] by registering at the following website: [email or web address]. By joining [facility]’s FollowMyHealth portal, you will also be able to view your health information using other applications (apps) compatible with our system.

## 2025-06-16 NOTE — DISCHARGE NOTE PROVIDER - CARE PROVIDER_API CALL
Yasmin Perez  Cardiovascular Disease  [address], [address]  [place] [address]  Phone: [phone]  Fax: [phone]  Scheduled Appointment: 06/20/2025 12:00 PM    Kaila Turner  Internal Medicine  [address] [place] [address]  Phone: [phone]  Fax: [phone]  Follow Up Time:     Andriy Saucedo  Neurology  [address], [address]  [place] [address]  Phone: [phone]  Fax: [phone]  Follow Up Time:

## 2025-06-16 NOTE — DISCHARGE NOTE NURSING/CASE MANAGEMENT/SOCIAL WORK - NSDCPEFALRISK_GEN_ALL_CORE
For information on Fall & Injury Prevention, visit: [email or web address] OR  [email or web address] OR  https://www.cdc.gov/steadi/patient.html

## 2025-06-16 NOTE — DISCHARGE NOTE NURSING/CASE MANAGEMENT/SOCIAL WORK - FINANCIAL ASSISTANCE
[facility] provides services at a reduced cost to those who are determined to be eligible through [facility]’s financial assistance program. Information regarding [facility]’s financial assistance program can be found by going to [email or web address] or by calling [phone].

## 2025-06-24 NOTE — ED CDU PROVIDER NOTE - PLAN OF CARE
Patient baseline mental status/Awake
1. stay hydrated.  2. Take Ibuprofen 600mg every 6hrs for pain as needed with food. Take Valium 1 tab every 8hrs for muscle spasm as needed- don't drive after.   3. Follow up with your PCP in 1 -2 days.  4. Follow up with your Spine Specialist Dr. Bowles in 2-3 days.  5. Bring Printed Results to your Doctor Visits.   6. Return if symptoms worsen, fever, weakness, numbness, bowel/urine incontinence and all other concerns

## 2025-06-25 NOTE — ED PROVIDER NOTE - NEUROLOGICAL, MLM
Discontinue Regimen: Efudex Plan: resume sunscreen use Render In Strict Bullet Format?: No Detail Level: Zone Alert and oriented, no focal deficits, no motor or sensory deficits.

## 2025-06-27 NOTE — ED PROVIDER NOTE - NS ED ATTENDING STATEMENT MOD
Problem: PAIN - ADULT  Goal: Verbalizes/displays adequate comfort level or baseline comfort level  Description: Interventions:  - Encourage patient to monitor pain and request assistance  - Assess pain using appropriate pain scale  - Administer analgesics as ordered based on type and severity of pain and evaluate response  - Implement non-pharmacological measures as appropriate and evaluate response  - Consider cultural and social influences on pain and pain management  - Notify physician/advanced practitioner if interventions unsuccessful or patient reports new pain  - Educate patient/family on pain management process including their role and importance of  reporting pain   - Provide non-pharmacologic/complimentary pain relief interventions  Outcome: Progressing     Problem: INFECTION - ADULT  Goal: Absence or prevention of progression during hospitalization  Description: INTERVENTIONS:  - Assess and monitor for signs and symptoms of infection  - Monitor lab/diagnostic results  - Monitor all insertion sites, i.e. indwelling lines, tubes, and drains  - Monitor endotracheal if appropriate and nasal secretions for changes in amount and color  - [place] appropriate cooling/warming therapies per order  - Administer medications as ordered  - Instruct and encourage patient and family to use good hand hygiene technique  - Identify and instruct in appropriate isolation precautions for identified infection/condition  Outcome: Progressing     Problem: Prexisting or High Potential for Compromised Skin Integrity  Goal: Skin integrity is maintained or improved  Description: INTERVENTIONS:  - Identify patients at risk for skin breakdown  - Assess and monitor skin integrity including under and around medical devices   - Assess and monitor nutrition and hydration status  - Monitor labs  - Assess for incontinence   - Turn and reposition patient  - Assist with mobility/ambulation  - Relieve pressure over eze prominences   -  Avoid friction and shearing  - Provide appropriate hygiene as needed including keeping skin clean and dry  - Evaluate need for skin moisturizer/barrier cream  - Collaborate with interdisciplinary team  - Patient/family teaching  - Consider wound care consult    Assess:  - Review Jamaal scale daily  - Clean and moisturize skin every 8 hours.   - Inspect skin when repositioning, toileting, and assisting with ADLS    Problem: Knowledge Deficit  Goal: Patient/family/caregiver demonstrates understanding of disease process, treatment plan, medications, and discharge instructions  Description: Complete learning assessment and assess knowledge base.  Interventions:  - Provide teaching at level of understanding  - Provide teaching via preferred learning methods  Outcome: Progressing      This was a shared visit with the ANDRIY. I reviewed and verified the documentation and independently performed the documented:

## 2025-07-15 ENCOUNTER — EMERGENCY (EMERGENCY)
Facility: HOSPITAL | Age: 45
LOS: 1 days | End: 2025-07-15
Attending: EMERGENCY MEDICINE | Admitting: EMERGENCY MEDICINE
Payer: COMMERCIAL

## 2025-07-15 VITALS
HEART RATE: 74 BPM | OXYGEN SATURATION: 100 % | RESPIRATION RATE: 17 BRPM | SYSTOLIC BLOOD PRESSURE: 122 MMHG | DIASTOLIC BLOOD PRESSURE: 83 MMHG

## 2025-07-15 VITALS
HEART RATE: 77 BPM | DIASTOLIC BLOOD PRESSURE: 94 MMHG | RESPIRATION RATE: 16 BRPM | SYSTOLIC BLOOD PRESSURE: 153 MMHG | TEMPERATURE: 98 F | HEIGHT: 64 IN | OXYGEN SATURATION: 97 %

## 2025-07-15 DIAGNOSIS — Z98.1 ARTHRODESIS STATUS: Chronic | ICD-10-CM

## 2025-07-15 DIAGNOSIS — Z98.890 OTHER SPECIFIED POSTPROCEDURAL STATES: Chronic | ICD-10-CM

## 2025-07-15 DIAGNOSIS — Z98.89 OTHER SPECIFIED POSTPROCEDURAL STATES: Chronic | ICD-10-CM

## 2025-07-15 LAB
ALBUMIN SERPL ELPH-MCNC: 3.8 G/DL — SIGNIFICANT CHANGE UP (ref 3.3–5)
ALP SERPL-CCNC: 52 U/L — SIGNIFICANT CHANGE UP (ref 40–120)
ALT FLD-CCNC: 23 U/L — SIGNIFICANT CHANGE UP (ref 4–33)
ANION GAP SERPL CALC-SCNC: 12 MMOL/L — SIGNIFICANT CHANGE UP (ref 7–14)
AST SERPL-CCNC: 38 U/L — HIGH (ref 4–32)
BASOPHILS # BLD AUTO: 0.05 K/UL — SIGNIFICANT CHANGE UP (ref 0–0.2)
BASOPHILS NFR BLD AUTO: 0.8 % — SIGNIFICANT CHANGE UP (ref 0–2)
BILIRUB SERPL-MCNC: <0.2 MG/DL — SIGNIFICANT CHANGE UP (ref 0.2–1.2)
BUN SERPL-MCNC: 15 MG/DL — SIGNIFICANT CHANGE UP (ref 7–23)
CALCIUM SERPL-MCNC: 9 MG/DL — SIGNIFICANT CHANGE UP (ref 8.4–10.5)
CHLORIDE SERPL-SCNC: 105 MMOL/L — SIGNIFICANT CHANGE UP (ref 98–107)
CO2 SERPL-SCNC: 23 MMOL/L — SIGNIFICANT CHANGE UP (ref 22–31)
CREAT SERPL-MCNC: 1.34 MG/DL — HIGH (ref 0.5–1.3)
EGFR: 49 ML/MIN/1.73M2 — LOW
EGFR: 49 ML/MIN/1.73M2 — LOW
EOSINOPHIL # BLD AUTO: 0.15 K/UL — SIGNIFICANT CHANGE UP (ref 0–0.5)
EOSINOPHIL NFR BLD AUTO: 2.3 % — SIGNIFICANT CHANGE UP (ref 0–6)
GLUCOSE SERPL-MCNC: 90 MG/DL — SIGNIFICANT CHANGE UP (ref 70–99)
HCG SERPL-ACNC: <1 MIU/ML — SIGNIFICANT CHANGE UP
HCT VFR BLD CALC: 37.8 % — SIGNIFICANT CHANGE UP (ref 34.5–45)
HGB BLD-MCNC: 12.1 G/DL — SIGNIFICANT CHANGE UP (ref 11.5–15.5)
IMM GRANULOCYTES # BLD AUTO: 0.01 K/UL — SIGNIFICANT CHANGE UP (ref 0–0.07)
IMM GRANULOCYTES NFR BLD AUTO: 0.2 % — SIGNIFICANT CHANGE UP (ref 0–0.9)
LYMPHOCYTES # BLD AUTO: 1.62 K/UL — SIGNIFICANT CHANGE UP (ref 1–3.3)
LYMPHOCYTES NFR BLD AUTO: 25.2 % — SIGNIFICANT CHANGE UP (ref 13–44)
MCHC RBC-ENTMCNC: 28.4 PG — SIGNIFICANT CHANGE UP (ref 27–34)
MCHC RBC-ENTMCNC: 32 G/DL — SIGNIFICANT CHANGE UP (ref 32–36)
MCV RBC AUTO: 88.7 FL — SIGNIFICANT CHANGE UP (ref 80–100)
MONOCYTES # BLD AUTO: 0.57 K/UL — SIGNIFICANT CHANGE UP (ref 0–0.9)
MONOCYTES NFR BLD AUTO: 8.9 % — SIGNIFICANT CHANGE UP (ref 2–14)
NEUTROPHILS # BLD AUTO: 4.02 K/UL — SIGNIFICANT CHANGE UP (ref 1.8–7.4)
NEUTROPHILS NFR BLD AUTO: 62.6 % — SIGNIFICANT CHANGE UP (ref 43–77)
NRBC # BLD AUTO: 0 K/UL — SIGNIFICANT CHANGE UP (ref 0–0)
NRBC # FLD: 0 K/UL — SIGNIFICANT CHANGE UP (ref 0–0)
NRBC BLD AUTO-RTO: 0 /100 WBCS — SIGNIFICANT CHANGE UP (ref 0–0)
NT-PROBNP SERPL-SCNC: 50 PG/ML — SIGNIFICANT CHANGE UP
PLATELET # BLD AUTO: 346 K/UL — SIGNIFICANT CHANGE UP (ref 150–400)
PMV BLD: 11.2 FL — SIGNIFICANT CHANGE UP (ref 7–13)
POTASSIUM SERPL-MCNC: 4.4 MMOL/L — SIGNIFICANT CHANGE UP (ref 3.5–5.3)
POTASSIUM SERPL-SCNC: 4.4 MMOL/L — SIGNIFICANT CHANGE UP (ref 3.5–5.3)
PROT SERPL-MCNC: 7 G/DL — SIGNIFICANT CHANGE UP (ref 6–8.3)
RBC # BLD: 4.26 M/UL — SIGNIFICANT CHANGE UP (ref 3.8–5.2)
RBC # FLD: 13.5 % — SIGNIFICANT CHANGE UP (ref 10.3–14.5)
SODIUM SERPL-SCNC: 140 MMOL/L — SIGNIFICANT CHANGE UP (ref 135–145)
TROPONIN T, HIGH SENSITIVITY RESULT: <6 NG/L — SIGNIFICANT CHANGE UP
WBC # BLD: 6.42 K/UL — SIGNIFICANT CHANGE UP (ref 3.8–10.5)
WBC # FLD AUTO: 6.42 K/UL — SIGNIFICANT CHANGE UP (ref 3.8–10.5)

## 2025-07-15 PROCEDURE — 93971 EXTREMITY STUDY: CPT | Mod: 26,LT

## 2025-07-15 PROCEDURE — 71046 X-RAY EXAM CHEST 2 VIEWS: CPT | Mod: 26

## 2025-07-15 PROCEDURE — 99284 EMERGENCY DEPT VISIT MOD MDM: CPT

## 2025-07-15 RX ORDER — METHOCARBAMOL 500 MG/1
2 TABLET, FILM COATED ORAL
Qty: 42 | Refills: 0
Start: 2025-07-15 | End: 2025-07-21

## 2025-07-15 RX ORDER — IBUPROFEN 200 MG
1 TABLET ORAL
Qty: 40 | Refills: 0
Start: 2025-07-15 | End: 2025-07-24

## 2025-07-15 RX ORDER — METHOCARBAMOL 500 MG/1
1500 TABLET, FILM COATED ORAL ONCE
Refills: 0 | Status: COMPLETED | OUTPATIENT
Start: 2025-07-15 | End: 2025-07-15

## 2025-07-15 RX ORDER — ACETAMINOPHEN 500 MG/5ML
1000 LIQUID (ML) ORAL ONCE
Refills: 0 | Status: COMPLETED | OUTPATIENT
Start: 2025-07-15 | End: 2025-07-15

## 2025-07-15 RX ORDER — DEXAMETHASONE 0.5 MG/1
10 TABLET ORAL ONCE
Refills: 0 | Status: COMPLETED | OUTPATIENT
Start: 2025-07-15 | End: 2025-07-15

## 2025-07-15 RX ORDER — ACETAMINOPHEN 500 MG/5ML
2 LIQUID (ML) ORAL
Qty: 80 | Refills: 0
Start: 2025-07-15 | End: 2025-07-24

## 2025-07-15 RX ORDER — LIDOCAINE HYDROCHLORIDE 20 MG/ML
1 JELLY TOPICAL
Qty: 4 | Refills: 0
Start: 2025-07-15 | End: 2025-07-28

## 2025-07-15 RX ORDER — LIDOCAINE HYDROCHLORIDE 20 MG/ML
1 JELLY TOPICAL ONCE
Refills: 0 | Status: COMPLETED | OUTPATIENT
Start: 2025-07-15 | End: 2025-07-15

## 2025-07-15 RX ADMIN — DEXAMETHASONE 10 MILLIGRAM(S): 0.5 TABLET ORAL at 06:28

## 2025-07-15 RX ADMIN — LIDOCAINE HYDROCHLORIDE 1 PATCH: 20 JELLY TOPICAL at 05:44

## 2025-07-15 RX ADMIN — Medication 400 MILLIGRAM(S): at 03:44

## 2025-07-15 RX ADMIN — METHOCARBAMOL 1500 MILLIGRAM(S): 500 TABLET, FILM COATED ORAL at 03:44

## 2025-07-15 RX ADMIN — Medication 1000 MILLIGRAM(S): at 06:23

## 2025-07-15 NOTE — ED ADULT TRIAGE NOTE - CHIEF COMPLAINT QUOTE
c/o L leg pain since Saturday causing difficulty ambulating. states feels like L calf is swollen compared to R.. denies falls/trauma/injury to leg. denies past med hx.

## 2025-07-15 NOTE — ED PROVIDER NOTE - NSFOLLOWUPINSTRUCTIONS_ED_ALL_ED_FT
You were seen in the emergency department and received an ultrasound of your leg due to concern for blood clot.  The ultrasound was negative for a blood clot.  You are also treated for spasm in your back which causes radiation down to your leg.  You were treated for the and symptoms improved.  Your pain is likely due to radicular pain radiating from back to your leg due to nerve impingement.  We would like for you to follow-up with a spine specialist.    We would like you to see someone form the spine center  [phone]    Please follow up with your doctor within 1 week. Bring copies of your results with you (provided in your discharge paperwork).     You may take 500-1000 mg acetaminophen (tylenol) every 6 hours, as needed for pain.  You may take 600 mg ibuprofen every 8 hours, with food, as needed for pain.  You can take tylenol and ibuprofen at the same time.   You can also take Robaxin twice a day for 3 days for the muscular tightness you have in your neck as needed for the pain.   Please do not use heavy mechanical machinery or drive while on this medication.    You can use lidocaine ever 12 hours for back pain    PLEASE RETURN TO ED FOR NEW OR WORSENING SYMPTOMS (intractable nausea/vomiting, severe bleeding, fever over 100.4F, loss of movement of one or more limbs, seizure)

## 2025-07-15 NOTE — ED PROVIDER NOTE - ATTENDING CONTRIBUTION TO CARE
44 y/o F with h/o MVP, chronic back pain (s/p cervical and lumbar surgeries) here with 3 days of L leg pain and swelling.  Pt reports she initially started to have pain to the back of her L thigh.  Now feels swelling and pain behind her knee that radiates down.  No associated fever, rash, numbness, tingling or weakness.  Denies any preceding injury or fall.  Pt denies CP and SOB at present, but does report several weeks of exertional CP and SOB.  She was recently evaluated by cardiology and had echo and CT coronaries that were normal.  No tob or ocp use.    Well appearing, lying comfortably in stretcher, awake and alert, nontoxic.  VSS.  Lungs cta bl.  Cards nl S1/S2, RRR, no MRG.  Abd soft ntnd.  (+)mild L lower ext nonpitting pedal edema, ttp in popliteal fossa, no rash; LLE NVI.    r/o dvt.  while pt with exertional cp.sob, none at present or rest.  Low suspicion for acs given recent normal workup.  r/o anemia.  Plan for labs, dvt study, reassess.

## 2025-07-15 NOTE — ED ADULT NURSE NOTE - OBJECTIVE STATEMENT
BREAK RN: Pt received to room 25, A&Ox4, ambulatory at baseline. Pt presenting to the ED today complaining of left lower extremity pain. Pt states her leg feels heavy and pain is in her calf. Positive ROM but pt endorsing difficulty ambulating. Respirations even and unlabored, NAD noted. 20G IV placed in the left AC, labs drawn and sent. Pending US. Comfort measures provided, safety maintained. Plan of care ongoing.

## 2025-07-15 NOTE — ED ADULT NURSE NOTE - NSFALLUNIVINTERV_ED_ALL_ED
[FreeTextEntry2] : POV #2 s/p left shoulder biceps tenotomy, RTC and labral debridement, and SAD DOS: 2/11/2025 Bed/Stretcher in lowest position, wheels locked, appropriate side rails in place/Call bell, personal items and telephone in reach/Instruct patient to call for assistance before getting out of bed/chair/stretcher/Non-slip footwear applied when patient is off stretcher/[place] to call system/Physically safe environment - no spills, clutter or unnecessary equipment/Purposeful proactive rounding/Room/bathroom lighting operational, light cord in reach

## 2025-07-15 NOTE — ED PROVIDER NOTE - CLINICAL SUMMARY MEDICAL DECISION MAKING FREE TEXT BOX
Patient with left lower extremity edema concerning for DVT due to pain in lower extremity patient endorsing around popliteal space with minimal lower extremity edema.  No concern for PE patient's heart rates in the 70s, no chest pain no cough afebrile, and shortness of breath predates lower extremity symptoms.  Will order ultrasound of left lower extremity basic labs ECG cardiac enzymes chest x-ray and EKG.

## 2025-07-15 NOTE — ED PROVIDER NOTE - PROGRESS NOTE DETAILS
Pt endorsing radiating pain for LLB to LLE Patient reassessed stating pain improved will continue reassessing.  Ultrasound negative for DVT pain is likely radicular from spasm and lower back.  Will DC with medications and follow-up with spine

## 2025-07-15 NOTE — ED PROVIDER NOTE - OBJECTIVE STATEMENT
45-year-old female PMH hypertension, MVP, chronic back pain status post spinal fixation presenting with left lower extremity pain    Patient states having left lower extremity pain starting on Saturday.  Patient states her leg feels heavy and pain is in her calf along with lower extremity edema.  Patient states having pain and difficulty when ambulating.  Patient denies any chest pain shortness of breath palpitations.  Patient states having recent stress test done and having exertional dyspnea since then.  Patient denies any recent trauma, hormone use, recent travel, cancer diagnosis.  Patient also endorsing sporadic chest pain with onset during telephone encounter.  Patient states chest pain has been present since before stress test.  Patient took naproxen and Flexeril at 9 PM for pain

## 2025-07-15 NOTE — ED PROVIDER NOTE - PHYSICAL EXAMINATION
GENERAL: NAD, lying in bed comfortably  HEAD:  Atraumatic, normocephalic  HEART: Regular rate and rhythm, no murmurs, rubs, or gallops  LUNGS: Unlabored respirations.  Clear to auscultation bilaterally, no crackles, wheezing, or rhonchi  ABDOMEN: Soft, nontender, nondistended, +BS  EXTREMITIES: 2+ peripheral pulses bilaterally. No clubbing, cyanosis, edema proximal LLE near knee  NERVOUS SYSTEM:  A&Ox3, moving all extremities, no focal deficits   SKIN: No rashes or lesions GENERAL: NAD, lying in bed comfortably  HEAD:  Atraumatic, normocephalic  HEART: Regular rate and rhythm, no murmurs, rubs, or gallops  LUNGS: Unlabored respirations.  Clear to auscultation bilaterally, no crackles, wheezing, or rhonchi  ABDOMEN: Soft, nontender, nondistended, +BS  EXTREMITIES: 2+ peripheral pulses bilaterally. No clubbing, cyanosis, edema proximal LLE near knee  MSK: hypertonicity left paraspinal muscles in lumbar region  NERVOUS SYSTEM:  A&Ox3, moving all extremities, no focal deficits   SKIN: No rashes or lesions

## 2025-07-15 NOTE — ED PROVIDER NOTE - PATIENT PORTAL LINK FT
You can access the FollowMyHealth Patient Portal offered by [facility] by registering at the following website: [email or web address]. By joining [facility]’s FollowMyHealth portal, you will also be able to view your health information using other applications (apps) compatible with our system.